# Patient Record
Sex: MALE | Race: WHITE | Employment: OTHER | URBAN - METROPOLITAN AREA
[De-identification: names, ages, dates, MRNs, and addresses within clinical notes are randomized per-mention and may not be internally consistent; named-entity substitution may affect disease eponyms.]

---

## 2021-09-29 ENCOUNTER — APPOINTMENT (OUTPATIENT)
Dept: GENERAL RADIOLOGY | Age: 76
DRG: 840 | End: 2021-09-29
Attending: EMERGENCY MEDICINE
Payer: MEDICARE

## 2021-09-29 ENCOUNTER — HOSPITAL ENCOUNTER (INPATIENT)
Age: 76
LOS: 11 days | Discharge: NURSING, REHAB OR LONG-TERM CARE | DRG: 840 | End: 2021-10-10
Attending: EMERGENCY MEDICINE | Admitting: STUDENT IN AN ORGANIZED HEALTH CARE EDUCATION/TRAINING PROGRAM
Payer: MEDICARE

## 2021-09-29 ENCOUNTER — APPOINTMENT (OUTPATIENT)
Dept: CT IMAGING | Age: 76
DRG: 840 | End: 2021-09-29
Attending: STUDENT IN AN ORGANIZED HEALTH CARE EDUCATION/TRAINING PROGRAM
Payer: MEDICARE

## 2021-09-29 DIAGNOSIS — C91.10 CLL (CHRONIC LYMPHOCYTIC LEUKEMIA) (HCC): ICD-10-CM

## 2021-09-29 DIAGNOSIS — R09.02 HYPOXIA: ICD-10-CM

## 2021-09-29 DIAGNOSIS — J18.9 COMMUNITY ACQUIRED PNEUMONIA OF LEFT LOWER LOBE OF LUNG: ICD-10-CM

## 2021-09-29 DIAGNOSIS — F41.9 CHRONIC ANXIETY: Primary | ICD-10-CM

## 2021-09-29 PROBLEM — J96.91 RESPIRATORY FAILURE WITH HYPOXIA (HCC): Status: ACTIVE | Noted: 2021-09-29

## 2021-09-29 LAB
ALBUMIN SERPL-MCNC: 3.3 G/DL (ref 3.5–5)
ALBUMIN/GLOB SERPL: 1.1 {RATIO} (ref 1.1–2.2)
ALP SERPL-CCNC: 173 U/L (ref 45–117)
ALT SERPL-CCNC: 34 U/L (ref 12–78)
ANION GAP SERPL CALC-SCNC: 2 MMOL/L (ref 5–15)
APPEARANCE UR: CLEAR
AST SERPL-CCNC: 28 U/L (ref 15–37)
ATRIAL RATE: 75 BPM
BACTERIA URNS QL MICRO: NEGATIVE /HPF
BILIRUB SERPL-MCNC: 0.6 MG/DL (ref 0.2–1)
BILIRUB UR QL: NEGATIVE
BNP SERPL-MCNC: 846 PG/ML
BUN SERPL-MCNC: 14 MG/DL (ref 6–20)
BUN/CREAT SERPL: 13 (ref 12–20)
CALCIUM SERPL-MCNC: 8.5 MG/DL (ref 8.5–10.1)
CALCULATED P AXIS, ECG09: 3 DEGREES
CALCULATED R AXIS, ECG10: 20 DEGREES
CALCULATED T AXIS, ECG11: -4 DEGREES
CHLORIDE SERPL-SCNC: 102 MMOL/L (ref 97–108)
CO2 SERPL-SCNC: 39 MMOL/L (ref 21–32)
COLOR UR: ABNORMAL
COMMENT, HOLDF: NORMAL
COVID-19 RAPID TEST, COVR: NOT DETECTED
CREAT SERPL-MCNC: 1.08 MG/DL (ref 0.7–1.3)
DIAGNOSIS, 93000: NORMAL
EPITH CASTS URNS QL MICRO: ABNORMAL /LPF
GLOBULIN SER CALC-MCNC: 3 G/DL (ref 2–4)
GLUCOSE SERPL-MCNC: 165 MG/DL (ref 65–100)
GLUCOSE UR STRIP.AUTO-MCNC: NEGATIVE MG/DL
HGB UR QL STRIP: ABNORMAL
KETONES UR QL STRIP.AUTO: NEGATIVE MG/DL
LACTATE SERPL-SCNC: 1.3 MMOL/L (ref 0.4–2)
LEUKOCYTE ESTERASE UR QL STRIP.AUTO: ABNORMAL
NITRITE UR QL STRIP.AUTO: NEGATIVE
P-R INTERVAL, ECG05: 174 MS
PH UR STRIP: 6.5 [PH] (ref 5–8)
POTASSIUM SERPL-SCNC: 3.3 MMOL/L (ref 3.5–5.1)
PROCALCITONIN SERPL-MCNC: <0.05 NG/ML
PROT SERPL-MCNC: 6.3 G/DL (ref 6.4–8.2)
PROT UR STRIP-MCNC: ABNORMAL MG/DL
Q-T INTERVAL, ECG07: 430 MS
QRS DURATION, ECG06: 88 MS
QTC CALCULATION (BEZET), ECG08: 480 MS
RBC #/AREA URNS HPF: ABNORMAL /HPF (ref 0–5)
SAMPLES BEING HELD,HOLD: NORMAL
SODIUM SERPL-SCNC: 143 MMOL/L (ref 136–145)
SOURCE, COVRS: NORMAL
SP GR UR REFRACTOMETRY: 1.02 (ref 1–1.03)
TROPONIN I SERPL-MCNC: <0.05 NG/ML
UROBILINOGEN UR QL STRIP.AUTO: 1 EU/DL (ref 0.2–1)
VENTRICULAR RATE, ECG03: 75 BPM
WBC URNS QL MICRO: ABNORMAL /HPF (ref 0–4)

## 2021-09-29 PROCEDURE — 74011000636 HC RX REV CODE- 636: Performed by: RADIOLOGY

## 2021-09-29 PROCEDURE — 74011250636 HC RX REV CODE- 250/636: Performed by: STUDENT IN AN ORGANIZED HEALTH CARE EDUCATION/TRAINING PROGRAM

## 2021-09-29 PROCEDURE — 83880 ASSAY OF NATRIURETIC PEPTIDE: CPT

## 2021-09-29 PROCEDURE — 99284 EMERGENCY DEPT VISIT MOD MDM: CPT

## 2021-09-29 PROCEDURE — 74011250636 HC RX REV CODE- 250/636: Performed by: EMERGENCY MEDICINE

## 2021-09-29 PROCEDURE — 83605 ASSAY OF LACTIC ACID: CPT

## 2021-09-29 PROCEDURE — 74011000258 HC RX REV CODE- 258: Performed by: EMERGENCY MEDICINE

## 2021-09-29 PROCEDURE — 71045 X-RAY EXAM CHEST 1 VIEW: CPT

## 2021-09-29 PROCEDURE — 94762 N-INVAS EAR/PLS OXIMTRY CONT: CPT

## 2021-09-29 PROCEDURE — 74011000258 HC RX REV CODE- 258: Performed by: STUDENT IN AN ORGANIZED HEALTH CARE EDUCATION/TRAINING PROGRAM

## 2021-09-29 PROCEDURE — 81001 URINALYSIS AUTO W/SCOPE: CPT

## 2021-09-29 PROCEDURE — 84145 PROCALCITONIN (PCT): CPT

## 2021-09-29 PROCEDURE — 85025 COMPLETE CBC W/AUTO DIFF WBC: CPT

## 2021-09-29 PROCEDURE — 36415 COLL VENOUS BLD VENIPUNCTURE: CPT

## 2021-09-29 PROCEDURE — 87040 BLOOD CULTURE FOR BACTERIA: CPT

## 2021-09-29 PROCEDURE — 74011250637 HC RX REV CODE- 250/637: Performed by: STUDENT IN AN ORGANIZED HEALTH CARE EDUCATION/TRAINING PROGRAM

## 2021-09-29 PROCEDURE — 80053 COMPREHEN METABOLIC PANEL: CPT

## 2021-09-29 PROCEDURE — 84484 ASSAY OF TROPONIN QUANT: CPT

## 2021-09-29 PROCEDURE — 65660000000 HC RM CCU STEPDOWN

## 2021-09-29 PROCEDURE — 0202U NFCT DS 22 TRGT SARS-COV-2: CPT

## 2021-09-29 PROCEDURE — 71275 CT ANGIOGRAPHY CHEST: CPT

## 2021-09-29 PROCEDURE — 96365 THER/PROPH/DIAG IV INF INIT: CPT

## 2021-09-29 PROCEDURE — 88374 M/PHMTRC ALYS ISHQUANT/SEMIQ: CPT

## 2021-09-29 PROCEDURE — 87635 SARS-COV-2 COVID-19 AMP PRB: CPT

## 2021-09-29 PROCEDURE — 93005 ELECTROCARDIOGRAM TRACING: CPT

## 2021-09-29 RX ORDER — TRAZODONE HYDROCHLORIDE 100 MG/1
100 TABLET ORAL
Status: DISCONTINUED | OUTPATIENT
Start: 2021-09-29 | End: 2021-10-10 | Stop reason: HOSPADM

## 2021-09-29 RX ORDER — POLYETHYLENE GLYCOL 3350 17 G/17G
17 POWDER, FOR SOLUTION ORAL DAILY PRN
Status: DISCONTINUED | OUTPATIENT
Start: 2021-09-29 | End: 2021-10-10 | Stop reason: HOSPADM

## 2021-09-29 RX ORDER — SODIUM CHLORIDE 0.9 % (FLUSH) 0.9 %
5-40 SYRINGE (ML) INJECTION EVERY 8 HOURS
Status: DISCONTINUED | OUTPATIENT
Start: 2021-09-29 | End: 2021-10-10 | Stop reason: HOSPADM

## 2021-09-29 RX ORDER — ONDANSETRON 4 MG/1
4 TABLET, ORALLY DISINTEGRATING ORAL
Status: DISCONTINUED | OUTPATIENT
Start: 2021-09-29 | End: 2021-10-10 | Stop reason: HOSPADM

## 2021-09-29 RX ORDER — BRIMONIDINE TARTRATE 2 MG/ML
1 SOLUTION/ DROPS OPHTHALMIC DAILY
Status: DISCONTINUED | OUTPATIENT
Start: 2021-09-30 | End: 2021-10-10 | Stop reason: HOSPADM

## 2021-09-29 RX ORDER — HEPARIN SODIUM 5000 [USP'U]/ML
5000 INJECTION, SOLUTION INTRAVENOUS; SUBCUTANEOUS EVERY 8 HOURS
Status: DISCONTINUED | OUTPATIENT
Start: 2021-09-30 | End: 2021-10-10 | Stop reason: HOSPADM

## 2021-09-29 RX ORDER — BUSPIRONE HYDROCHLORIDE 5 MG/1
10 TABLET ORAL 2 TIMES DAILY
Status: DISCONTINUED | OUTPATIENT
Start: 2021-09-30 | End: 2021-10-10 | Stop reason: HOSPADM

## 2021-09-29 RX ORDER — ACETAMINOPHEN 650 MG/1
650 SUPPOSITORY RECTAL
Status: DISCONTINUED | OUTPATIENT
Start: 2021-09-29 | End: 2021-10-10 | Stop reason: HOSPADM

## 2021-09-29 RX ORDER — ONDANSETRON 2 MG/ML
4 INJECTION INTRAMUSCULAR; INTRAVENOUS
Status: DISCONTINUED | OUTPATIENT
Start: 2021-09-29 | End: 2021-10-10 | Stop reason: HOSPADM

## 2021-09-29 RX ORDER — AMOXICILLIN 250 MG
1 CAPSULE ORAL 2 TIMES DAILY
Status: DISCONTINUED | OUTPATIENT
Start: 2021-09-29 | End: 2021-10-10 | Stop reason: HOSPADM

## 2021-09-29 RX ORDER — LATANOPROST 50 UG/ML
1 SOLUTION/ DROPS OPHTHALMIC EVERY EVENING
Status: DISCONTINUED | OUTPATIENT
Start: 2021-09-30 | End: 2021-10-10 | Stop reason: HOSPADM

## 2021-09-29 RX ORDER — LISINOPRIL 10 MG/1
10 TABLET ORAL DAILY
Status: DISCONTINUED | OUTPATIENT
Start: 2021-09-30 | End: 2021-10-10 | Stop reason: HOSPADM

## 2021-09-29 RX ORDER — ATORVASTATIN CALCIUM 20 MG/1
20 TABLET, FILM COATED ORAL
Status: DISCONTINUED | OUTPATIENT
Start: 2021-09-29 | End: 2021-09-30

## 2021-09-29 RX ORDER — FLUOXETINE HYDROCHLORIDE 20 MG/1
60 CAPSULE ORAL DAILY
Status: DISCONTINUED | OUTPATIENT
Start: 2021-09-30 | End: 2021-10-10 | Stop reason: HOSPADM

## 2021-09-29 RX ORDER — SODIUM CHLORIDE 9 MG/ML
100 INJECTION, SOLUTION INTRAVENOUS CONTINUOUS
Status: DISCONTINUED | OUTPATIENT
Start: 2021-09-29 | End: 2021-10-01

## 2021-09-29 RX ORDER — SODIUM CHLORIDE 0.9 % (FLUSH) 0.9 %
5-40 SYRINGE (ML) INJECTION AS NEEDED
Status: DISCONTINUED | OUTPATIENT
Start: 2021-09-29 | End: 2021-10-10 | Stop reason: HOSPADM

## 2021-09-29 RX ORDER — ARIPIPRAZOLE 2 MG/1
2 TABLET ORAL DAILY
Status: DISCONTINUED | OUTPATIENT
Start: 2021-09-30 | End: 2021-10-10 | Stop reason: HOSPADM

## 2021-09-29 RX ORDER — ALPRAZOLAM 1 MG/1
1 TABLET ORAL
Status: DISCONTINUED | OUTPATIENT
Start: 2021-09-29 | End: 2021-10-06

## 2021-09-29 RX ORDER — ACETAMINOPHEN 325 MG/1
650 TABLET ORAL
Status: DISCONTINUED | OUTPATIENT
Start: 2021-09-29 | End: 2021-10-10 | Stop reason: HOSPADM

## 2021-09-29 RX ADMIN — ATORVASTATIN CALCIUM 20 MG: 20 TABLET, FILM COATED ORAL at 22:44

## 2021-09-29 RX ADMIN — TRAZODONE HYDROCHLORIDE 100 MG: 100 TABLET ORAL at 22:43

## 2021-09-29 RX ADMIN — CEFTRIAXONE SODIUM 1 G: 1 INJECTION, POWDER, FOR SOLUTION INTRAMUSCULAR; INTRAVENOUS at 20:24

## 2021-09-29 RX ADMIN — SODIUM CHLORIDE 100 ML/HR: 9 INJECTION, SOLUTION INTRAVENOUS at 22:15

## 2021-09-29 RX ADMIN — IOPAMIDOL 80 ML: 755 INJECTION, SOLUTION INTRAVENOUS at 21:56

## 2021-09-29 RX ADMIN — DOXYCYCLINE 100 MG: 100 INJECTION, POWDER, LYOPHILIZED, FOR SOLUTION INTRAVENOUS at 22:45

## 2021-09-29 NOTE — ED TRIAGE NOTES
Patient resident at [facility]. Patient was checked by staff and called EMS for RA sats in the 60s. EMS arrived and the patient had no complaints and checked the patient's sats and they were in the 60s. EMS placed on NRB at 8 liters. Sats came up into the 90s.

## 2021-09-29 NOTE — ED PROVIDER NOTES
This is a 28-year-old male who saw his doctor yesterday for breakdown of the skin at the buttocks crease. Patient says they did not do anything for him. He does have some pain in that area. He was checked today routinely for an oxygen saturation was found to be in the 60s. He was placed on oxygen and it got somewhat better. EMS was called and he was brought here. He denies any shortness of breath and has no chest pain. He has had no fever or chills, nausea or vomiting. There has been an occasional cough. He denies any pain or swelling in his legs. He denies any other acute symptomatology. The only complaint is the area on his buttocks. No past medical history on file. No past surgical history on file. No family history on file. Social History     Socioeconomic History    Marital status: SINGLE     Spouse name: Not on file    Number of children: Not on file    Years of education: Not on file    Highest education level: Not on file   Occupational History    Not on file   Tobacco Use    Smoking status: Not on file   Substance and Sexual Activity    Alcohol use: Not on file    Drug use: Not on file    Sexual activity: Not on file   Other Topics Concern    Not on file   Social History Narrative    Not on file     Social Determinants of Health     Financial Resource Strain:     Difficulty of Paying Living Expenses:    Food Insecurity:     Worried About Running Out of Food in the Last Year:     [address] in the Last Year:    Transportation Needs:     Lack of Transportation (Medical):      Lack of Transportation (Non-Medical):    Physical Activity:     Days of Exercise per Week:     Minutes of Exercise per Session:    Stress:     Feeling of Stress :    Social Connections:     Frequency of Communication with Friends and Family:     Frequency of Social Gatherings with Friends and Family:     Attends [employer] Services:     Active Member of Clubs or Organizations:  Attends Club or Organization Meetings:     Marital Status:    Intimate Partner Violence:     Fear of Current or Ex-Partner:     Emotionally Abused:     Physically Abused:     Sexually Abused: ALLERGIES: Patient has no known allergies. Review of Systems   Constitutional: Negative for activity change, appetite change, chills, fatigue and fever. HENT: Negative for ear pain, facial swelling, sore throat and trouble swallowing. Eyes: Negative for pain, discharge and visual disturbance. Respiratory: Positive for cough ( occasional). Negative for chest tightness, shortness of breath and wheezing. Low oxygen saturations noted by the home   Cardiovascular: Negative for chest pain and palpitations. Gastrointestinal: Negative for abdominal pain, blood in stool, nausea and vomiting. Genitourinary: Negative for difficulty urinating, flank pain and hematuria. Musculoskeletal: Negative for arthralgias, joint swelling, myalgias and neck pain. Skin: Negative for color change and rash. Breakdown on the buttocks area. Neurological: Negative for dizziness, weakness, numbness and headaches. Hematological: Negative for adenopathy. Does not bruise/bleed easily. Psychiatric/Behavioral: Negative for behavioral problems, confusion and sleep disturbance. All other systems reviewed and are negative. Vitals:    09/29/21 1719   BP: (!) 158/98   Pulse: 76   Resp: 20   Temp: 99.1 °F (37.3 °C)   SpO2: 98%   Weight: 91.6 kg (202 lb)            Physical Exam  Vitals and nursing note reviewed. Constitutional:       General: He is not in acute distress. Appearance: He is well-developed. He is not ill-appearing or diaphoretic. Comments: This is a 50-year-old male who does not appear to be in any acute distress. Vital signs are as noted. HENT:      Head: Normocephalic and atraumatic. Nose: Nose normal.   Eyes:      General: No scleral icterus.      Conjunctiva/sclera: Conjunctivae normal.      Pupils: Pupils are equal, round, and reactive to light. Neck:      Thyroid: No thyromegaly. Vascular: No JVD. Trachea: No tracheal deviation. Comments: No carotid bruits noted. Cardiovascular:      Rate and Rhythm: Normal rate and regular rhythm. Heart sounds: Normal heart sounds. No murmur heard. No friction rub. No gallop. Pulmonary:      Effort: Pulmonary effort is normal. Tachypnea present. No accessory muscle usage or respiratory distress. Breath sounds: No stridor. Rales ( There are diffuse Rales in both lung fields bilaterally posteriorly and anteriorly. No definitive consolidation is noted.) present. No decreased breath sounds, wheezing or rhonchi. Chest:      Chest wall: No tenderness. Abdominal:      General: Bowel sounds are normal. There is no distension. Palpations: Abdomen is soft. There is no mass. Tenderness: There is no abdominal tenderness. There is no guarding or rebound. Musculoskeletal:         General: No tenderness. Normal range of motion. Cervical back: Normal range of motion and neck supple. Lymphadenopathy:      Cervical: No cervical adenopathy. Skin:     General: Skin is warm and dry. Findings: No erythema or rash. Comments: There is superficial skin breakdown at the beginning of the buttocks crease on black a lateral cheeks. The area is relatively small at about 3 cm or so in diameter. There is no erythema worse evidence of abscess formation. The area is somewhat tender to palpation. No other acute abnormalities noted there. There is some chronic erythema and breakdown on the right anterior lower leg that involves roughly third of the anterior portion of the lower leg. It is not hot. Neurological:      Mental Status: He is alert and oriented to person, place, and time. Cranial Nerves: No cranial nerve deficit.       Coordination: Coordination normal.      Deep Tendon Reflexes: Reflexes are normal and symmetric. Psychiatric:         Behavior: Behavior normal.         Thought Content: Thought content normal.         Judgment: Judgment normal.          MDM  Number of Diagnoses or Management Options  Community acquired pneumonia of left lower lobe of lung: new and requires workup  Hypoxia: new and requires workup     Amount and/or Complexity of Data Reviewed  Clinical lab tests: ordered and reviewed  Tests in the radiology section of CPT®: ordered and reviewed  Decide to obtain previous medical records or to obtain history from someone other than the patient: yes  Review and summarize past medical records: yes  Discuss the patient with other providers: yes  Independent visualization of images, tracings, or specimens: yes    Risk of Complications, Morbidity, and/or Mortality  Presenting problems: high  Diagnostic procedures: high  Management options: high    Patient Progress  Patient progress: stable         Procedures    This is a 69-year-old male who does not appear to be in any acute distress. He speaking in full sentences. On 6 L of nasal oxygen he is at 92%. He does have significant findings on chest exam bilaterally. No consolidation is noted. X-rays and labs are currently pending. Will likely require admission with hypoxia. Covid testing is being done. The patient's white cell count is up to 15,000. His lactate is normal but his saturations are low and it appears that he has markings in his left middle and lower lobe and in the right lower lobe that are not inconsistent with pneumonia. Consult the hospitalist for admission. Perfect Serve Consult for Admission  7:36 PM    ED Room Number: PK82/01  Patient Name and age:  Tara Zhao 77 y.o.  male  Working Diagnosis:   1. Hypoxia    2.  Community acquired pneumonia of left lower lobe of lung        COVID-19 Suspicion:  no  Sepsis present:  no  Reassessment needed: no  Code Status:  Full Code  Readmission: no  Isolation Requirements:  no  Recommended Level of Care:  telemetry  Department:[facility] Adult ED - [phone]  Other:

## 2021-09-29 NOTE — ED NOTES
Bedside shift change report given to Fabian Jennings (oncoming nurse) by Mary Doran (offgoing nurse). Report included the following information SBAR, Kardex, ED Summary and MAR.

## 2021-09-30 ENCOUNTER — APPOINTMENT (OUTPATIENT)
Dept: NON INVASIVE DIAGNOSTICS | Age: 76
DRG: 840 | End: 2021-09-30
Attending: INTERNAL MEDICINE
Payer: MEDICARE

## 2021-09-30 ENCOUNTER — APPOINTMENT (OUTPATIENT)
Dept: GENERAL RADIOLOGY | Age: 76
DRG: 840 | End: 2021-09-30
Attending: INTERNAL MEDICINE
Payer: MEDICARE

## 2021-09-30 LAB
ANION GAP SERPL CALC-SCNC: 3 MMOL/L (ref 5–15)
ARTERIAL PATENCY WRIST A: POSITIVE
ARTERIAL PATENCY WRIST A: POSITIVE
B PERT DNA SPEC QL NAA+PROBE: NOT DETECTED
B PERT DNA SPEC QL NAA+PROBE: NOT DETECTED
BASE EXCESS BLD CALC-SCNC: 12.5 MMOL/L
BASE EXCESS BLD CALC-SCNC: 8.4 MMOL/L
BASOPHILS # BLD: 0 K/UL (ref 0–0.1)
BASOPHILS # BLD: 0 K/UL (ref 0–0.1)
BASOPHILS NFR BLD: 0 % (ref 0–1)
BASOPHILS NFR BLD: 0 % (ref 0–1)
BDY SITE: ABNORMAL
BDY SITE: ABNORMAL
BNP SERPL-MCNC: 639 PG/ML
BORDETELLA PARAPERTUSSIS PCR, BORPAR: NOT DETECTED
BORDETELLA PARAPERTUSSIS PCR, BORPAR: NOT DETECTED
BUN SERPL-MCNC: 13 MG/DL (ref 6–20)
BUN/CREAT SERPL: 16 (ref 12–20)
C PNEUM DNA SPEC QL NAA+PROBE: NOT DETECTED
C PNEUM DNA SPEC QL NAA+PROBE: NOT DETECTED
CALCIUM SERPL-MCNC: 7.9 MG/DL (ref 8.5–10.1)
CHLORIDE SERPL-SCNC: 104 MMOL/L (ref 97–108)
CO2 SERPL-SCNC: 36 MMOL/L (ref 21–32)
CREAT SERPL-MCNC: 0.8 MG/DL (ref 0.7–1.3)
DIFFERENTIAL METHOD BLD: ABNORMAL
DIFFERENTIAL METHOD BLD: ABNORMAL
ECHO AO ROOT DIAM: 3.75 CM
ECHO AV AREA PEAK VELOCITY: 2.37 CM2
ECHO AV PEAK GRADIENT: 14.4 MMHG
ECHO AV PEAK VELOCITY: 189.76 CM/S
ECHO EST RA PRESSURE: 3 MMHG
ECHO LV INTERNAL DIMENSION DIASTOLIC: 4.73 CM (ref 4.2–5.9)
ECHO LV INTERNAL DIMENSION SYSTOLIC: 3.2 CM
ECHO LV IVSD: 1.22 CM (ref 0.6–1)
ECHO LV MASS 2D: 201.6 G (ref 88–224)
ECHO LV POSTERIOR WALL DIASTOLIC: 1.08 CM (ref 0.6–1)
ECHO LVOT DIAM: 2.44 CM
ECHO LVOT PEAK GRADIENT: 3.69 MMHG
ECHO LVOT PEAK VELOCITY: 95.99 CM/S
ECHO MV A VELOCITY: 116.19 CM/S
ECHO MV AREA PHT: 3.28 CM2
ECHO MV E DECELERATION TIME (DT): 231.58 MS
ECHO MV E VELOCITY: 92.5 CM/S
ECHO MV E/A RATIO: 0.8
ECHO MV PRESSURE HALF TIME (PHT): 67.16 MS
ECHO PV PEAK INSTANTANEOUS GRADIENT SYSTOLIC: 2.72 MMHG
ECHO RIGHT VENTRICULAR SYSTOLIC PRESSURE (RVSP): 20.75 MMHG
ECHO RV INTERNAL DIMENSION: 4.45 CM
ECHO RV TAPSE: 1.62 CM (ref 1.5–2)
ECHO TV REGURGITANT MAX VELOCITY: 210.66 CM/S
ECHO TV REGURGITANT PEAK GRADIENT: 17.75 MMHG
EOSINOPHIL # BLD: 0.2 K/UL (ref 0–0.4)
EOSINOPHIL # BLD: 0.5 K/UL (ref 0–0.4)
EOSINOPHIL NFR BLD: 2 % (ref 0–7)
EOSINOPHIL NFR BLD: 3 % (ref 0–7)
ERYTHROCYTE [DISTWIDTH] IN BLOOD BY AUTOMATED COUNT: 13.5 % (ref 11.5–14.5)
ERYTHROCYTE [DISTWIDTH] IN BLOOD BY AUTOMATED COUNT: 13.7 % (ref 11.5–14.5)
FLUAV H1 2009 PAND RNA SPEC QL NAA+PROBE: NOT DETECTED
FLUAV H1 2009 PAND RNA SPEC QL NAA+PROBE: NOT DETECTED
FLUAV H1 RNA SPEC QL NAA+PROBE: NOT DETECTED
FLUAV H1 RNA SPEC QL NAA+PROBE: NOT DETECTED
FLUAV H3 RNA SPEC QL NAA+PROBE: NOT DETECTED
FLUAV H3 RNA SPEC QL NAA+PROBE: NOT DETECTED
FLUAV SUBTYP SPEC NAA+PROBE: NOT DETECTED
FLUAV SUBTYP SPEC NAA+PROBE: NOT DETECTED
FLUBV RNA SPEC QL NAA+PROBE: NOT DETECTED
FLUBV RNA SPEC QL NAA+PROBE: NOT DETECTED
GAS FLOW.O2 O2 DELIVERY SYS: ABNORMAL L/MIN
GLUCOSE SERPL-MCNC: 160 MG/DL (ref 65–100)
HADV DNA SPEC QL NAA+PROBE: NOT DETECTED
HADV DNA SPEC QL NAA+PROBE: NOT DETECTED
HCO3 BLD-SCNC: 39.4 MMOL/L (ref 22–26)
HCO3 BLD-SCNC: 42.7 MMOL/L (ref 22–26)
HCOV 229E RNA SPEC QL NAA+PROBE: NOT DETECTED
HCOV 229E RNA SPEC QL NAA+PROBE: NOT DETECTED
HCOV HKU1 RNA SPEC QL NAA+PROBE: NOT DETECTED
HCOV HKU1 RNA SPEC QL NAA+PROBE: NOT DETECTED
HCOV NL63 RNA SPEC QL NAA+PROBE: NOT DETECTED
HCOV NL63 RNA SPEC QL NAA+PROBE: NOT DETECTED
HCOV OC43 RNA SPEC QL NAA+PROBE: NOT DETECTED
HCOV OC43 RNA SPEC QL NAA+PROBE: NOT DETECTED
HCT VFR BLD AUTO: 47.9 % (ref 36.6–50.3)
HCT VFR BLD AUTO: 48.7 % (ref 36.6–50.3)
HGB BLD-MCNC: 14.3 G/DL (ref 12.1–17)
HGB BLD-MCNC: 14.8 G/DL (ref 12.1–17)
HMPV RNA SPEC QL NAA+PROBE: NOT DETECTED
HMPV RNA SPEC QL NAA+PROBE: NOT DETECTED
HPIV1 RNA SPEC QL NAA+PROBE: NOT DETECTED
HPIV1 RNA SPEC QL NAA+PROBE: NOT DETECTED
HPIV2 RNA SPEC QL NAA+PROBE: NOT DETECTED
HPIV2 RNA SPEC QL NAA+PROBE: NOT DETECTED
HPIV3 RNA SPEC QL NAA+PROBE: NOT DETECTED
HPIV3 RNA SPEC QL NAA+PROBE: NOT DETECTED
HPIV4 RNA SPEC QL NAA+PROBE: NOT DETECTED
HPIV4 RNA SPEC QL NAA+PROBE: NOT DETECTED
IMM GRANULOCYTES # BLD AUTO: 0 K/UL
IMM GRANULOCYTES # BLD AUTO: 0 K/UL
IMM GRANULOCYTES NFR BLD AUTO: 0 %
IMM GRANULOCYTES NFR BLD AUTO: 0 %
LYMPHOCYTES # BLD: 10.2 K/UL (ref 0.8–3.5)
LYMPHOCYTES # BLD: 8.1 K/UL (ref 0.8–3.5)
LYMPHOCYTES NFR BLD: 65 % (ref 12–49)
LYMPHOCYTES NFR BLD: 69 % (ref 12–49)
M PNEUMO DNA SPEC QL NAA+PROBE: NOT DETECTED
M PNEUMO DNA SPEC QL NAA+PROBE: NOT DETECTED
MCH RBC QN AUTO: 28.9 PG (ref 26–34)
MCH RBC QN AUTO: 29.8 PG (ref 26–34)
MCHC RBC AUTO-ENTMCNC: 29.4 G/DL (ref 30–36.5)
MCHC RBC AUTO-ENTMCNC: 30.9 G/DL (ref 30–36.5)
MCV RBC AUTO: 96.6 FL (ref 80–99)
MCV RBC AUTO: 98.4 FL (ref 80–99)
METAMYELOCYTES NFR BLD MANUAL: 1 %
MONOCYTES # BLD: 0.5 K/UL (ref 0–1)
MONOCYTES # BLD: 1.1 K/UL (ref 0–1)
MONOCYTES NFR BLD: 3 % (ref 5–13)
MONOCYTES NFR BLD: 9 % (ref 5–13)
NEUTS SEG # BLD: 2.9 K/UL (ref 1.8–8)
NEUTS SEG # BLD: 3.8 K/UL (ref 1.8–8)
NEUTS SEG NFR BLD: 23 % (ref 32–75)
NEUTS SEG NFR BLD: 25 % (ref 32–75)
NRBC # BLD: 0 K/UL (ref 0–0.01)
NRBC # BLD: 0.02 K/UL (ref 0–0.01)
NRBC BLD-RTO: 0 PER 100 WBC
NRBC BLD-RTO: 0.1 PER 100 WBC
O2/TOTAL GAS SETTING VFR VENT: 40 %
PATH REV BLD -IMP: ABNORMAL
PCO2 BLD: 77.7 MMHG (ref 35–45)
PCO2 BLD: 85.8 MMHG (ref 35–45)
PH BLD: 7.27 [PH] (ref 7.35–7.45)
PH BLD: 7.35 [PH] (ref 7.35–7.45)
PLATELET # BLD AUTO: 100 K/UL (ref 150–400)
PLATELET # BLD AUTO: 115 K/UL (ref 150–400)
PMV BLD AUTO: 10 FL (ref 8.9–12.9)
PMV BLD AUTO: 9.5 FL (ref 8.9–12.9)
PO2 BLD: 70 MMHG (ref 80–100)
PO2 BLD: 87 MMHG (ref 80–100)
POTASSIUM SERPL-SCNC: 3.5 MMOL/L (ref 3.5–5.1)
PROCALCITONIN SERPL-MCNC: <0.05 NG/ML
RBC # BLD AUTO: 4.95 M/UL (ref 4.1–5.7)
RBC # BLD AUTO: 4.96 M/UL (ref 4.1–5.7)
RBC MORPH BLD: ABNORMAL
RBC MORPH BLD: ABNORMAL
RSV RNA SPEC QL NAA+PROBE: NOT DETECTED
RSV RNA SPEC QL NAA+PROBE: NOT DETECTED
RV+EV RNA SPEC QL NAA+PROBE: NOT DETECTED
RV+EV RNA SPEC QL NAA+PROBE: NOT DETECTED
SAO2 % BLD: 91.5 % (ref 92–97)
SAO2 % BLD: 94.3 % (ref 92–97)
SARS-COV-2 PCR, COVPCR: NOT DETECTED
SARS-COV-2 PCR, COVPCR: NOT DETECTED
SODIUM SERPL-SCNC: 143 MMOL/L (ref 136–145)
SPECIMEN TYPE: ABNORMAL
SPECIMEN TYPE: ABNORMAL
WBC # BLD AUTO: 12.4 K/UL (ref 4.1–11.1)
WBC # BLD AUTO: 15 K/UL (ref 4.1–11.1)
WBC MORPH BLD: ABNORMAL

## 2021-09-30 PROCEDURE — 97535 SELF CARE MNGMENT TRAINING: CPT

## 2021-09-30 PROCEDURE — 82803 BLOOD GASES ANY COMBINATION: CPT

## 2021-09-30 PROCEDURE — 97161 PT EVAL LOW COMPLEX 20 MIN: CPT

## 2021-09-30 PROCEDURE — 97165 OT EVAL LOW COMPLEX 30 MIN: CPT

## 2021-09-30 PROCEDURE — 74011250637 HC RX REV CODE- 250/637: Performed by: STUDENT IN AN ORGANIZED HEALTH CARE EDUCATION/TRAINING PROGRAM

## 2021-09-30 PROCEDURE — 94660 CPAP INITIATION&MGMT: CPT

## 2021-09-30 PROCEDURE — 74011000258 HC RX REV CODE- 258: Performed by: STUDENT IN AN ORGANIZED HEALTH CARE EDUCATION/TRAINING PROGRAM

## 2021-09-30 PROCEDURE — 36415 COLL VENOUS BLD VENIPUNCTURE: CPT

## 2021-09-30 PROCEDURE — 85025 COMPLETE CBC W/AUTO DIFF WBC: CPT

## 2021-09-30 PROCEDURE — 74011000250 HC RX REV CODE- 250: Performed by: STUDENT IN AN ORGANIZED HEALTH CARE EDUCATION/TRAINING PROGRAM

## 2021-09-30 PROCEDURE — 77010033678 HC OXYGEN DAILY

## 2021-09-30 PROCEDURE — 93306 TTE W/DOPPLER COMPLETE: CPT | Performed by: INTERNAL MEDICINE

## 2021-09-30 PROCEDURE — 74011250636 HC RX REV CODE- 250/636: Performed by: STUDENT IN AN ORGANIZED HEALTH CARE EDUCATION/TRAINING PROGRAM

## 2021-09-30 PROCEDURE — 0202U NFCT DS 22 TRGT SARS-COV-2: CPT

## 2021-09-30 PROCEDURE — 84145 PROCALCITONIN (PCT): CPT

## 2021-09-30 PROCEDURE — 97530 THERAPEUTIC ACTIVITIES: CPT

## 2021-09-30 PROCEDURE — 80048 BASIC METABOLIC PNL TOTAL CA: CPT

## 2021-09-30 PROCEDURE — 74011250637 HC RX REV CODE- 250/637: Performed by: INTERNAL MEDICINE

## 2021-09-30 PROCEDURE — 65660000000 HC RM CCU STEPDOWN

## 2021-09-30 PROCEDURE — 74011250636 HC RX REV CODE- 250/636: Performed by: INTERNAL MEDICINE

## 2021-09-30 PROCEDURE — 5A09357 ASSISTANCE WITH RESPIRATORY VENTILATION, LESS THAN 24 CONSECUTIVE HOURS, CONTINUOUS POSITIVE AIRWAY PRESSURE: ICD-10-PCS | Performed by: INTERNAL MEDICINE

## 2021-09-30 PROCEDURE — 99223 1ST HOSP IP/OBS HIGH 75: CPT | Performed by: INTERNAL MEDICINE

## 2021-09-30 PROCEDURE — 71045 X-RAY EXAM CHEST 1 VIEW: CPT

## 2021-09-30 PROCEDURE — 36600 WITHDRAWAL OF ARTERIAL BLOOD: CPT

## 2021-09-30 PROCEDURE — C8929 TTE W OR WO FOL WCON,DOPPLER: HCPCS

## 2021-09-30 PROCEDURE — 83880 ASSAY OF NATRIURETIC PEPTIDE: CPT

## 2021-09-30 PROCEDURE — 74011000250 HC RX REV CODE- 250: Performed by: INTERNAL MEDICINE

## 2021-09-30 PROCEDURE — 77030013038 HC MSK CPAP FISP -A

## 2021-09-30 PROCEDURE — 94762 N-INVAS EAR/PLS OXIMTRY CONT: CPT

## 2021-09-30 RX ORDER — ARIPIPRAZOLE 2 MG/1
1 TABLET ORAL DAILY
COMMUNITY
Start: 2021-09-26

## 2021-09-30 RX ORDER — SERTRALINE HYDROCHLORIDE 100 MG/1
1 TABLET, FILM COATED ORAL DAILY
COMMUNITY
Start: 2021-09-07 | End: 2021-10-10

## 2021-09-30 RX ORDER — FLUOXETINE HYDROCHLORIDE 60 MG/1
1 TABLET, FILM COATED ORAL; ORAL DAILY
COMMUNITY
Start: 2021-09-25

## 2021-09-30 RX ORDER — LISINOPRIL 10 MG/1
1 TABLET ORAL DAILY
COMMUNITY
Start: 2021-09-23

## 2021-09-30 RX ORDER — ACETAMINOPHEN 325 MG/1
650 TABLET ORAL 2 TIMES DAILY
COMMUNITY
End: 2021-10-10

## 2021-09-30 RX ORDER — BALSAM PERU/CASTOR OIL
OINTMENT (GRAM) TOPICAL 3 TIMES DAILY
Status: DISCONTINUED | OUTPATIENT
Start: 2021-09-30 | End: 2021-10-10 | Stop reason: HOSPADM

## 2021-09-30 RX ORDER — POTASSIUM CHLORIDE 7.45 MG/ML
10 INJECTION INTRAVENOUS
Status: COMPLETED | OUTPATIENT
Start: 2021-09-30 | End: 2021-09-30

## 2021-09-30 RX ORDER — BRIMONIDINE TARTRATE 2 MG/ML
1 SOLUTION/ DROPS OPHTHALMIC DAILY
COMMUNITY
Start: 2021-08-30

## 2021-09-30 RX ORDER — ATORVASTATIN CALCIUM 20 MG/1
30 TABLET, FILM COATED ORAL
COMMUNITY

## 2021-09-30 RX ORDER — AMOXICILLIN 250 MG
1 CAPSULE ORAL
COMMUNITY

## 2021-09-30 RX ORDER — TRAZODONE HYDROCHLORIDE 100 MG/1
1 TABLET ORAL
COMMUNITY
Start: 2021-09-10 | End: 2021-10-10

## 2021-09-30 RX ORDER — LATANOPROST 50 UG/ML
1 SOLUTION/ DROPS OPHTHALMIC DAILY
COMMUNITY
Start: 2021-09-16

## 2021-09-30 RX ORDER — ATORVASTATIN CALCIUM 10 MG/1
1 TABLET, FILM COATED ORAL
Status: ON HOLD | COMMUNITY
Start: 2021-09-20 | End: 2021-09-30

## 2021-09-30 RX ORDER — METFORMIN HYDROCHLORIDE 500 MG/1
0.5 TABLET ORAL 2 TIMES DAILY
COMMUNITY
Start: 2021-09-24

## 2021-09-30 RX ORDER — BUSPIRONE HYDROCHLORIDE 10 MG/1
1 TABLET ORAL 2 TIMES DAILY
COMMUNITY
Start: 2021-09-26

## 2021-09-30 RX ORDER — ALPRAZOLAM 1 MG/1
1 TABLET ORAL 2 TIMES DAILY
Status: ON HOLD | COMMUNITY
Start: 2021-08-14 | End: 2021-10-10 | Stop reason: SDUPTHER

## 2021-09-30 RX ORDER — ICOSAPENT ETHYL 1000 MG/1
1 CAPSULE ORAL DAILY
COMMUNITY
Start: 2021-09-27

## 2021-09-30 RX ADMIN — Medication 10 ML: at 01:26

## 2021-09-30 RX ADMIN — POTASSIUM CHLORIDE 10 MEQ: 7.45 INJECTION INTRAVENOUS at 11:59

## 2021-09-30 RX ADMIN — FLUOXETINE 60 MG: 20 CAPSULE ORAL at 08:59

## 2021-09-30 RX ADMIN — POTASSIUM CHLORIDE 10 MEQ: 7.45 INJECTION INTRAVENOUS at 08:59

## 2021-09-30 RX ADMIN — HEPARIN SODIUM 5000 UNITS: 5000 INJECTION INTRAVENOUS; SUBCUTANEOUS at 09:00

## 2021-09-30 RX ADMIN — HEPARIN SODIUM 5000 UNITS: 5000 INJECTION INTRAVENOUS; SUBCUTANEOUS at 23:52

## 2021-09-30 RX ADMIN — DOXYCYCLINE 100 MG: 100 INJECTION, POWDER, LYOPHILIZED, FOR SOLUTION INTRAVENOUS at 21:44

## 2021-09-30 RX ADMIN — BUSPIRONE HYDROCHLORIDE 10 MG: 5 TABLET ORAL at 17:05

## 2021-09-30 RX ADMIN — LISINOPRIL 10 MG: 10 TABLET ORAL at 09:00

## 2021-09-30 RX ADMIN — DOXYCYCLINE 100 MG: 100 INJECTION, POWDER, LYOPHILIZED, FOR SOLUTION INTRAVENOUS at 09:31

## 2021-09-30 RX ADMIN — SENNOSIDES AND DOCUSATE SODIUM 1 TABLET: 50; 8.6 TABLET ORAL at 08:59

## 2021-09-30 RX ADMIN — ARIPIPRAZOLE 2 MG: 2 TABLET ORAL at 09:00

## 2021-09-30 RX ADMIN — BRIMONIDINE TARTRATE 1 DROP: 2 SOLUTION OPHTHALMIC at 09:00

## 2021-09-30 RX ADMIN — CASTOR OIL AND BALSAM, PERU: 788; 87 OINTMENT TOPICAL at 17:04

## 2021-09-30 RX ADMIN — Medication 10 ML: at 06:37

## 2021-09-30 RX ADMIN — Medication 10 ML: at 21:45

## 2021-09-30 RX ADMIN — HEPARIN SODIUM 5000 UNITS: 5000 INJECTION INTRAVENOUS; SUBCUTANEOUS at 17:05

## 2021-09-30 RX ADMIN — HEPARIN SODIUM 5000 UNITS: 5000 INJECTION INTRAVENOUS; SUBCUTANEOUS at 00:22

## 2021-09-30 RX ADMIN — BUSPIRONE HYDROCHLORIDE 10 MG: 5 TABLET ORAL at 08:59

## 2021-09-30 RX ADMIN — CEFTRIAXONE SODIUM 2 G: 2 INJECTION, POWDER, FOR SOLUTION INTRAMUSCULAR; INTRAVENOUS at 09:32

## 2021-09-30 RX ADMIN — CASTOR OIL AND BALSAM, PERU: 788; 87 OINTMENT TOPICAL at 21:45

## 2021-09-30 RX ADMIN — TRAZODONE HYDROCHLORIDE 100 MG: 100 TABLET ORAL at 21:44

## 2021-09-30 RX ADMIN — LATANOPROST 1 DROP: 50 SOLUTION OPHTHALMIC at 17:11

## 2021-09-30 RX ADMIN — POTASSIUM CHLORIDE 10 MEQ: 7.45 INJECTION INTRAVENOUS at 09:59

## 2021-09-30 RX ADMIN — SODIUM CHLORIDE 100 ML/HR: 9 INJECTION, SOLUTION INTRAVENOUS at 20:18

## 2021-09-30 RX ADMIN — ATORVASTATIN CALCIUM 30 MG: 20 TABLET, FILM COATED ORAL at 21:44

## 2021-09-30 RX ADMIN — POTASSIUM CHLORIDE 10 MEQ: 7.45 INJECTION INTRAVENOUS at 10:59

## 2021-09-30 RX ADMIN — PERFLUTREN 1.5 ML: 6.52 INJECTION, SUSPENSION INTRAVENOUS at 12:10

## 2021-09-30 NOTE — PROGRESS NOTES
Bedside shift change report given to [address] (oncoming nurse) by [facility] RN (offgoing nurse). Report included the following information SBAR, Kardex, Intake/Output, MAR and Recent Results.

## 2021-09-30 NOTE — H&P
History & Physical    Primary Care Provider: No primary care provider on file. Source of Information: Patient and chart review    History of Presenting Illness:   Sandy Lai is a 77 y.o. male w/ hx of mdd w/ SERGEI, htn, glaucoma, NIDDM II, Dyslipidemia who is referred to hospital from [address] [place] [address] for hypoxia. Incidental hypoxia noted during routine vitals. Pt states he feels well otherwise and had no acute complaints or concerns. Reported hypoxia to 60s prior to EMS arrival.  Concerned for \"lesions\" on his buttocks noted about a week ago, recurrent and intermittently painful. Saw his PCP abiut these lesion and was prescribed a cream. Denies melena or dyschezia. The patient denies any fever, cough, chills, chest or abdominal pain, nausea, vomiting, cough, congestion, recent illness, palpitations, or dysuria. Remarkable vitals on ER Presentations: bp 158/98, o2 sats 98% on 8L Non-rebreather  Labs Remarkable for: wbc 15, k 3.3, glu 165, rapid covid neg  ER Images: Patchy densities in the left mid and lower thorax and right base could represent  atelectasis or airspace disease. ER Rx: rocephin 1g     Review of Systems:  A comprehensive review of systems was negative except for that written in the History of Present Illness. No past medical history on file. No past surgical history on file. Prior to Admission medications    Not on File     No Known Allergies   No family history on file.      SOCIAL HISTORY:  Patient resides:  Independently    Assisted Living x   SNF    With family care       Smoking history:   None x   Former    Chronic      Alcohol history:   None x   Social    Chronic      Ambulates:   Independently x   w/cane    w/walker    w/wc    CODE STATUS:  DNR    Full x   Other      Objective:     Physical Exam:     Visit Vitals  BP (!) 154/78   Pulse 75   Temp 99.1 °F (37.3 °C)   Resp 23   Wt 91.6 kg (202 lb)   SpO2 95%    O2 Flow Rate (L/min): 8 l/min O2 Device: Non-rebreather mask    General:  Alert, cooperative, no distress, appears stated age. Head:  Normocephalic, without obvious abnormality, atraumatic. Eyes:  Conjunctivae/corneas clear. PERRL, EOMs intact. Nose: Nares normal. Septum midline. Mucosa normal.        Neck: Supple, symmetrical, trachea midline, no carotid bruit and no JVD. Lungs:   Clear to auscultation bilaterally. Diminished bibasalar air entry. Chest wall:  No tenderness or deformity. Heart:  Regular rate and rhythm, S1, S2 normal   Abdomen:   Soft, non-tender. Bowel sounds normal. No masses,  No organomegaly. Extremities: Extremities normal, atraumatic, no cyanosis or edema. Bottock: nickel sized areas of crusted skin-tears w/o erythema or drainage. Pulses: 2+ and symmetric all extremities. Skin: Skin color, texture, turgor normal. No rashes or lesions   Neurologic: CNII-XII intact. EKG:  NSR w/ PVC  Data Review:     Recent Days:  Recent Labs     09/29/21  1755   WBC 15.0*   HGB 14.8   HCT 47.9   *     Recent Labs     09/29/21  1755      K 3.3*      CO2 39*   *   BUN 14   CREA 1.08   CA 8.5   ALB 3.3*   ALT 34     No results for input(s): PH, PCO2, PO2, HCO3, FIO2 in the last 72 hours. 24 Hour Results:  Recent Results (from the past 24 hour(s))   CBC WITH AUTOMATED DIFF    Collection Time: 09/29/21  5:55 PM   Result Value Ref Range    WBC 15.0 (H) 4.1 - 11.1 K/uL    RBC 4.96 4.10 - 5.70 M/uL    HGB 14.8 12.1 - 17.0 g/dL    HCT 47.9 36.6 - 50.3 %    MCV 96.6 80.0 - 99.0 FL    MCH 29.8 26.0 - 34.0 PG    MCHC 30.9 30.0 - 36.5 g/dL    RDW 13.7 11.5 - 14.5 %    PLATELET 403 (L) 740 - 400 K/uL    MPV 9.5 8.9 - 12.9 FL    NRBC 0.1 (H) 0  WBC    ABSOLUTE NRBC 0.02 (H) 0.00 - 0.01 K/uL    NEUTROPHILS PENDING %    LYMPHOCYTES PENDING %    MONOCYTES PENDING %    EOSINOPHILS PENDING %    BASOPHILS PENDING %    IMMATURE GRANULOCYTES PENDING %    ABS.  NEUTROPHILS PENDING K/UL ABS. LYMPHOCYTES PENDING K/UL    ABS. MONOCYTES PENDING K/UL    ABS. EOSINOPHILS PENDING K/UL    ABS. BASOPHILS PENDING K/UL    ABS. IMM. GRANS. PENDING K/UL    DF PENDING    METABOLIC PANEL, COMPREHENSIVE    Collection Time: 09/29/21  5:55 PM   Result Value Ref Range    Sodium 143 136 - 145 mmol/L    Potassium 3.3 (L) 3.5 - 5.1 mmol/L    Chloride 102 97 - 108 mmol/L    CO2 39 (H) 21 - 32 mmol/L    Anion gap 2 (L) 5 - 15 mmol/L    Glucose 165 (H) 65 - 100 mg/dL    BUN 14 6 - 20 MG/DL    Creatinine 1.08 0.70 - 1.30 MG/DL    BUN/Creatinine ratio 13 12 - 20      GFR est AA >60 >60 ml/min/1.73m2    GFR est non-AA >60 >60 ml/min/1.73m2    Calcium 8.5 8.5 - 10.1 MG/DL    Bilirubin, total 0.6 0.2 - 1.0 MG/DL    ALT (SGPT) 34 12 - 78 U/L    AST (SGOT) 28 15 - 37 U/L    Alk. phosphatase 173 (H) 45 - 117 U/L    Protein, total 6.3 (L) 6.4 - 8.2 g/dL    Albumin 3.3 (L) 3.5 - 5.0 g/dL    Globulin 3.0 2.0 - 4.0 g/dL    A-G Ratio 1.1 1.1 - 2.2     SAMPLES BEING HELD    Collection Time: 09/29/21  5:55 PM   Result Value Ref Range    SAMPLES BEING HELD 1RED 1BLU     COMMENT        Add-on orders for these samples will be processed based on acceptable specimen integrity and analyte stability, which may vary by analyte.    LACTIC ACID    Collection Time: 09/29/21  5:55 PM   Result Value Ref Range    Lactic acid 1.3 0.4 - 2.0 MMOL/L   NT-PRO BNP    Collection Time: 09/29/21  5:55 PM   Result Value Ref Range    NT pro- (H) <450 PG/ML   TROPONIN I    Collection Time: 09/29/21  5:55 PM   Result Value Ref Range    Troponin-I, Qt. <0.05 <0.05 ng/mL   URINALYSIS W/ RFLX MICROSCOPIC    Collection Time: 09/29/21  6:20 PM   Result Value Ref Range    Color YELLOW/STRAW      Appearance CLEAR CLEAR      Specific gravity 1.021 1.003 - 1.030      pH (UA) 6.5 5.0 - 8.0      Protein TRACE (A) NEG mg/dL    Glucose Negative NEG mg/dL    Ketone Negative NEG mg/dL    Bilirubin Negative NEG      Blood TRACE (A) NEG      Urobilinogen 1.0 0.2 - 1.0 EU/dL Nitrites Negative NEG      Leukocyte Esterase MODERATE (A) NEG      WBC 0-4 0 - 4 /hpf    RBC 0-5 0 - 5 /hpf    Epithelial cells FEW FEW /lpf    Bacteria Negative NEG /hpf   COVID-19 RAPID TEST    Collection Time: 09/29/21  6:20 PM   Result Value Ref Range    Specimen source Nasopharyngeal      COVID-19 rapid test Not detected NOTD           Imaging:     Assessment:     Olayinka Brownlee is a 77 y.o. male w/ hx of mdd w/ SERGEI, htn, glaucoma, NIDDM II, Dyslipidemia who is admitted for acute hypoxic respiratory failure. Plan:       Acute Hypoxic Respiratory Failure  -Unclear etiology w/ CXR concerning for infection vs atelectasis  -? Hx of COPD, ZAHIRA and ?obesity hypoventilation  -Check Procalcitonin, CTA chest, RVP, abg  -Rocephin + Doxy    HTN  -Continue home Lisinopril    NIDDM II  -Hold Metformin  -Lantus 10u + SSI    MDD w/ SERGEI   -Continue Buspar, Fluoexetine, trazodone, prn xanax    Dyslipidemia  -Home lipitor    Glaucoma  -Continue home brimonidine and xalatan              FEN/GI -  NS @ 100 ml/hr  Activity - as tolerated  DVT prophylaxis - Heparin  GI prophylaxis -  NI  Disposition - Back to assisted living    CODE STATUS:  DNR       Signed By: Celine Pastor MD     September 29, 2021

## 2021-09-30 NOTE — PROGRESS NOTES
Spiritual Care Assessment/Progress Note  [address]      NAME: Irina Rabago      MRN: [id number]  AGE: 77 y.o.  SEX: male  Taoist Affiliation: Scientologist   Language: English     9/30/2021     Total Time (in minutes): 25     Spiritual Assessment begun in [facility] IMCU through conversation with:         [x]Patient        [] Family    [] Friend(s)        Reason for Consult: Initial/Spiritual assessment, patient floor, Request by patient     Spiritual beliefs: (Please include comment if needed)     [x] Identifies with a lencho tradition: Scientologist        [] Supported by a lencho community:            [] Claims no spiritual orientation:           [] Seeking spiritual identity:                [] Adheres to an individual form of spirituality:           [] Not able to assess:                           Identified resources for coping:      [x] Prayer                               [] Music                  [] Guided Imagery     [] Family/friends                 [] Pet visits     [] Devotional reading                         [] Unknown     [] Other:                                               Interventions offered during this visit: (See comments for more details)    Patient Interventions: Affirmation of emotions/emotional suffering, Affirmation of lencho, Catharsis/review of pertinent events in supportive environment, Coping skills reviewed/reinforced, Iconic (affirming the presence of God/Higher Power), Normalization of emotional/spiritual concerns, Prayer (actual)           Plan of Care:     [] Support spiritual and/or cultural needs    [] Support AMD and/or advance care planning process      [] Support grieving process   [] Coordinate Rites and/or Rituals    [] Coordination with community clergy   [] No spiritual needs identified at this time   [] Detailed Plan of Care below (See Comments)  [] Make referral to Music Therapy  [] Make referral to Pet Therapy     [] Make referral to Addiction services  [] Make referral to [facility]  [] Make referral to Spiritual Care Partner  [] No future visits requested        [x] Follow up upon further referrals     Comments:  visit for initial spiritual assessment. Patient requested  visit. Patient reclining in bed, good eye contact, friendly. Provided spiritual presence and listening as he spoke of his present thoughts, feelings, and concerns. Says he has been feeling a little under the weather both physically and spiritually. Asked if it is wrong to ask God to take you home if God is ready. Says he is not hoping to die and would not do anything to take his own life or facilitate his death, but says he is ready to go when it is his time. Says he has been living at [facility] for a while but did not remember how long. Says he does not have any remaining family and spoke of the loss of his father as losing his only family support. Spoke of his lencho and described an active lencho and trust in God. Provided safe environment for him to express his emotions and feelings. Afterward, he requested prayer and a prayer was offered. Said he felt much better and only needed someone to talk to. Informed him of availability of  and pastoral care services including [employer] [facility] and visits from [address] [place] [address] and staff .  Bonnie Frankel he is looking forward to their visits. He appeared comforted and encouraged as a result of this visit and prayer and expressed gratitude for this visit. Rev.  Bailey Lamb MDiv, [facility], [facility]   paging service: [phone])

## 2021-09-30 NOTE — WOUND CARE
WOCN Note:     New wound care consult placed for wounds to buttocks    Chart shows:  Patient admitted on 9/29/21 with Respiratory failure with hypoxia  PMHX: diabetes, HTN    Assessment:   A&O x 4, Appropriately conversational   Reports no pain   Mobility: Independent  with repositioning. Able to turn independently from side to side in bed   Continence: Continent of urine and stool    Last Neymar Score: 17  Surface: foam mattress  Diet: regular 3 carb choice    From [facility]    Bilateral heel, and sacral skin intact and without erythema   Heels offloaded with pillows     1. POA: 2 healed wounds with peeling skin to bilateral buttocks   Etiology: Friction? Left 0.8 x 0.8 cm  Right 0.7 x 0.8 cm   Periwound intact with mild blanchable erythema    Wound Recommendations:      Venelex to buttocks TID    PI Prevention:  Turn/reposition approximately every 2 hours  Offload heels with pillows at all times in bed. Sacral Foam dressing: lift to assess regularly; change as needed. Discontinue if incontinent. Keep HOB 30 degrees or less to decrease shearing and pressure unless medically contraindicated.  If HOB is to be over 30 degrees, raise knees first then [facility] to prevent sliding   Minimize layers of linen/pads under patient to optimize support surface to one sheet and one incontinence pad     Orders received from Dr. Patrick Jean  Discussed with [facility]    Transition of Care: Plan to follow weekly and as needed while admitted to hospital.     Richard Ojeda MSN, RN, [facility], [address] [place] [address]  Certified Wound and Ostomy Nurse  office [phone]  Can be reached through [address] [place] [address]  pager [phone] or call  to page

## 2021-09-30 NOTE — ROUTINE PROCESS
TRANSFER - OUT REPORT:    Verbal report given to Heike Hernandez (name) on Ayleen Boothe  being transferred to [address] [place] [address](unit) for routine progression of care       Report consisted of patients Situation, Background, Assessment and   Recommendations(SBAR). Information from the following report(s) SBAR, ED Summary, Intake/Output, MAR and Recent Results was reviewed with the receiving nurse. Lines:   Peripheral IV 09/29/21 Right Forearm (Active)   Site Assessment Clean, dry, & intact 09/29/21 2024   Phlebitis Assessment 0 09/29/21 2024   Infiltration Assessment 0 09/29/21 2024   Dressing Status Clean, dry, & intact 09/29/21 2024        Opportunity for questions and clarification was provided.       Patient transported with:   [facility]

## 2021-09-30 NOTE — PROGRESS NOTES
[facility] [facility] Adult  Hospitalist Group                                                                                          Hospitalist Progress Note  Gavi Webster MD  Answering service: [phone] from in house phone        Date of Service:  2021  NAME:  Kathleen Chavarria  :  890  MRN:  [id number]      Admission Summary:   Kathleen Chavarria is a 77 y.o. male w/ hx of mdd w/ SERGEI, htn, glaucoma, NIDDM II, Dyslipidemia who is referred to hospital from [address] [place] [address] for hypoxia. Incidental hypoxia noted during routine vitals. Pt states he feels well otherwise and had no acute complaints or concerns. Reported hypoxia to 60s prior to EMS arrival.  Concerned for \"lesions\" on his buttocks noted about a week ago, recurrent and intermittently painful. Saw his PCP abiut these lesion and was prescribed a cream. Denies melena or dyschezia. The patient denies any fever, cough, chills, chest or abdominal pain, nausea, vomiting, cough, congestion, recent illness, palpitations, or dysuria.     Remarkable vitals on ER Presentations: bp 158/98, o2 sats 98% on 8L Non-rebreather  Labs Remarkable for: wbc 15, k 3.3, glu 165, rapid covid neg  ER Images: Patchy densities in the left mid and lower thorax and right base could represent  atelectasis or airspace disease. ER Rx: rocephin 1g    Interval history / Subjective: Follow up shortness of breath, hypoxia. Patient seen and examined at the bedside. Labs, images and notes reviewed  Discussed with nursing staff, orders reviewed. Plan discussed with patient/Family  Patient is feeling okay. Off BiPAP this morning. Overnight events noted, was on BiPAP with abnormal ABGs as noted. Currently on 5 LPM O2 NC. Does not recall reason for hospitalization as well as being on BiPAP. Currently alert, oriented x3 and clear headed. Able to answer questions appropriately. Following commands appropriately.   Denied any chest pain, shortness of breath, palpitation, dizziness, lightheadedness. No congestion feeling. Does not have home oxygen or any oxygen needs before this hospitalization. Assessment & Plan:     #Shortness of breath, infection VS atelectasis VS pulmonary edema  #Acute hypoxic and hypercarbic respiratory failure as noted on ABG 9/30/2021, overnight on BiPAP since late night/early this morning. Currently off BiPAP. On 5 LPM O2 NC  #Left upper lobe infiltrate with bilateral hilar and mediastinal lymphadenopathylikely reactive VS other lymphomatous condition, pneumonia/pneumonitis VS pulmonary edema  #HTN  #HLD  #Hypokalemia  #DM2, controlled  #MDD with SERGEI  #Glaucoma  #Obesity, weight 211 LB, pending height assessment and BMI assessment    -Admitted to inpatient, telemetry monitoring in IMCU  -Sick patient  -BiPAP as needed  -Repeat CXR, ABG, procalcitonin, proBNP  -Echocardiogram  -Currently on IV antibioticsRocephin and doxycycline. On admission, procalcitonin <0.05. If repeat PCT unchanged, consider discontinuing antibiotics and monitor off it  -Monitor I/os. -Based on recheck of proBNP, consider diuresis VS follow echocardiogram results and clinical evolution  -Consider pulmonology consultation  -Per pathology, recommend hematooncology consult for lymphoma concern. Requested today 9/30.  -Continue CBC, BMP/CMP monitoring  -Continue other home medications as already ordered. -D/W pharmacy team    Guarded prognosis. Code status: DNR  DVT prophylaxis: Heparin SQ    Care Plan discussed with: Patient/Family, Nurse and   Anticipated Disposition: Home w/Family and TBD as per PT OT  Anticipated Discharge: Greater than 48 hours     Hospital Problems  Never Reviewed        Codes Class Noted POA    Respiratory failure with hypoxia [facility]) ICD-10-CM: J96.91  ICD-9-CM: 518.81  9/29/2021 Unknown                Review of Systems:   A comprehensive review of systems was negative except for that written in the HPI.        Vital Signs:    Last 24hrs VS reviewed since prior progress note. Most recent are:  Visit Vitals  BP (!) 153/71   Pulse 69   Temp 98.4 °F (36.9 °C)   Resp 18   Wt 95.7 kg (211 lb)   SpO2 96%         Intake/Output Summary (Last 24 hours) at 9/30/2021 1339  Last data filed at 9/30/2021 1308  Gross per 24 hour   Intake    Output 350 ml   Net -350 ml        Physical Examination:     I had a face to face encounter with this patient and independently examined them on 9/30/2021 as outlined below:          Constitutional:  No acute distress, cooperative, pleasant    ENT:  Oral mucosa moist, oropharynx benign. Resp:  CTA bilaterally. No wheezing/rhonchi/rales. No accessory muscle use   CV:  Regular rhythm, normal rate, no murmurs, gallops, rubs    GI:  Soft, non distended, non tender. normoactive bowel sounds, no hepatosplenomegaly     Musculoskeletal:  No edema, warm, 2+ pulses throughout    Neurologic:  Moves all extremities. AAOx3, CN II-XII reviewed            Data Review:    Review and/or order of clinical lab test  Review and/or order of tests in the radiology section of CPT  Review and/or order of tests in the medicine section of CPT    CTA CHEST W OR W WO CONT    Result Date: 9/29/2021  1. Focal airspace infiltrate anterior left upper lobe suspicious for possible focus of pneumonia with nonspecific broad areas of groundglass opacity which may reflect pulmonary edema, differential aeration, pneumonitis, or infectious process. 2. Hilar and mediastinal adenopathy which may be reactive and for which nonemergent follow-up is recommended. 3. Additional incidentals as above including coronary artery disease. XR CHEST PORT    Result Date: 9/30/2021  1. No interval change     XR CHEST PORT    Result Date: 9/29/2021  Depth of inspiration is shallow. There are linear densities and patchy densities in the left mid and lower thorax and right base could represent atelectasis or airspace disease.       Labs:     Recent Labs 09/30/21 0356 09/29/21  1755   WBC 12.4* 15.0*   HGB 14.3 14.8   HCT 48.7 47.9   * 115*     Recent Labs     09/30/21  0356 09/29/21  1755    143   K 3.5 3.3*    102   CO2 36* 39*   BUN 13 14   CREA 0.80 1.08   * 165*   CA 7.9* 8.5     Recent Labs     09/29/21  1755   ALT 34   *   TBILI 0.6   TP 6.3*   ALB 3.3*   GLOB 3.0     No results for input(s): INR, PTP, APTT, INREXT in the last 72 hours. No results for input(s): FE, TIBC, PSAT, FERR in the last 72 hours. No results found for: FOL, RBCF   No results for input(s): PH, PCO2, PO2 in the last 72 hours.   Recent Labs     09/29/21 1755   TROIQ <0.05     No results found for: CHOL, CHOLX, CHLST, CHOLV, HDL, HDLP, LDL, LDLC, DLDLP, TGLX, TRIGL, TRIGP, CHHD, CHHDX  No results found for: [facility]  Lab Results   Component Value Date/Time    Color YELLOW/STRAW 09/29/2021 06:20 PM    Appearance CLEAR 09/29/2021 06:20 PM    Specific gravity 1.021 09/29/2021 06:20 PM    pH (UA) 6.5 09/29/2021 06:20 PM    Protein TRACE (A) 09/29/2021 06:20 PM    Glucose Negative 09/29/2021 06:20 PM    Ketone Negative 09/29/2021 06:20 PM    Bilirubin Negative 09/29/2021 06:20 PM    Urobilinogen 1.0 09/29/2021 06:20 PM    Nitrites Negative 09/29/2021 06:20 PM    Leukocyte Esterase MODERATE (A) 09/29/2021 06:20 PM    Epithelial cells FEW 09/29/2021 06:20 PM    Bacteria Negative 09/29/2021 06:20 PM    WBC 0-4 09/29/2021 06:20 PM    RBC 0-5 09/29/2021 06:20 PM         Medications Reviewed:     Current Facility-Administered Medications   Medication Dose Route Frequency    [START ON 10/1/2021] cefTRIAXone (ROCEPHIN) 1 g in sterile water (preservative free) 10 mL IV syringe  1 g IntraVENous Q24H    doxycycline (VIBRAMYCIN) 100 mg in 0.9% sodium chloride (MBP/ADV) 100 mL MBP  100 mg IntraVENous Q12H    busPIRone (BUSPAR) tablet 10 mg  10 mg Oral BID    FLUoxetine (PROzac) capsule 60 mg  60 mg Oral DAILY    atorvastatin (LIPITOR) tablet 20 mg  20 mg Oral QHS    lisinopriL (PRINIVIL, ZESTRIL) tablet 10 mg  10 mg Oral DAILY    senna-docusate (PERICOLACE) 8.6-50 mg per tablet 1 Tablet  1 Tablet Oral BID    traZODone (DESYREL) tablet 100 mg  100 mg Oral QHS    brimonidine (ALPHAGAN) 0.2 % ophthalmic solution 1 Drop  1 Drop Both Eyes DAILY    latanoprost (XALATAN) 0.005 % ophthalmic solution 1 Drop  1 Drop Both Eyes QPM    ALPRAZolam (XANAX) tablet 1 mg  1 mg Oral BID PRN    ARIPiprazole (ABILIFY) tablet 2 mg  2 mg Oral DAILY    sodium chloride (NS) flush 5-40 mL  5-40 mL IntraVENous Q8H    sodium chloride (NS) flush 5-40 mL  5-40 mL IntraVENous PRN    acetaminophen (TYLENOL) tablet 650 mg  650 mg Oral Q6H PRN    Or    acetaminophen (TYLENOL) suppository 650 mg  650 mg Rectal Q6H PRN    polyethylene glycol (MIRALAX) packet 17 g  17 g Oral DAILY PRN    ondansetron (ZOFRAN ODT) tablet 4 mg  4 mg Oral Q8H PRN    Or    ondansetron (ZOFRAN) injection 4 mg  4 mg IntraVENous Q6H PRN    0.9% sodium chloride infusion  100 mL/hr IntraVENous CONTINUOUS    heparin (porcine) injection 5,000 Units  5,000 Units SubCUTAneous Q8H     ______________________________________________________________________  EXPECTED LENGTH OF STAY: - - -  ACTUAL LENGTH OF STAY:          1                 Luiz Card MD

## 2021-09-30 NOTE — PROGRESS NOTES
TRANSFER - IN REPORT:    Verbal report received from Geovanna(name) on Paras Bolton  being received from ED(unit) for routine progression of care      Report consisted of patients Situation, Background, Assessment and   Recommendations(SBAR). Information from the following report(s) SBAR, Kardex, [facility] and Recent Results was reviewed with the receiving nurse. Opportunity for questions and clarification was provided. Assessment completed upon patients arrival to unit and care assumed. PT vitals stable, ambulated to bed from stretched. No complaints at this time. Educated on fall precautions and oriented to room. Call bell and personal items within reach.

## 2021-09-30 NOTE — PROGRESS NOTES
Rounded on Mandaen patients and provided Anointing of the Sick and Communion at request of patient.     Maicol Luong

## 2021-09-30 NOTE — PROGRESS NOTES
Clinical Pharmacy Note: Ceftriaxone Dosing    Please note that the ceftriaxone dose for Naga Garza has been changed to 1000 mg IV q24h per [facility]-approved protocol. Please contact the pharmacy with any questions.     SHAMAR Arrieta

## 2021-09-30 NOTE — PROGRESS NOTES
[facility] [place] [facility] [address], [place]: [phone]  F: [phone]    Reason for Evaluation   Vamsi Apodaca is a 77 y.o. male who is seen as a new patient for evaluation of abnormal peripheral smear - atypical lymphocytes. Treatment History:   · None thus far     History of Present Illness:     Mr. Jovanny Worley is a pleasant 77 y.o.   old male who presented to [facility] from [facility] with hypoxia. This was reportedly found on routine vital signs. CTA with focal airspace infiltrate anterior left upper lobe suspicious for possible focus of pneumonia with nonspecific broad areas of groundglass opacity. Hilar and mediastinal adenopathy which may be reactive. CXR today with no interval change. He denies any changes in appetite, weight, night sweats, abdominal pain. CBC with elevated WBC at 12.4, normal Hgb, and mildly decreased platelets. Peripheral smear obtained and resulting with atypical lymphocytes. PMH and PSH    HTN  Glaucoma  NIDDM     Social History     Tobacco Use    Smoking status: Not on file   Substance Use Topics    Alcohol use: Not on file      No family history on file.   Current Facility-Administered Medications   Medication Dose Route Frequency    [START ON 10/1/2021] cefTRIAXone (ROCEPHIN) 1 g in sterile water (preservative free) 10 mL IV syringe  1 g IntraVENous Q24H    doxycycline (VIBRAMYCIN) 100 mg in 0.9% sodium chloride (MBP/ADV) 100 mL MBP  100 mg IntraVENous Q12H    busPIRone (BUSPAR) tablet 10 mg  10 mg Oral BID    FLUoxetine (PROzac) capsule 60 mg  60 mg Oral DAILY    atorvastatin (LIPITOR) tablet 20 mg  20 mg Oral QHS    lisinopriL (PRINIVIL, ZESTRIL) tablet 10 mg  10 mg Oral DAILY    senna-docusate (PERICOLACE) 8.6-50 mg per tablet 1 Tablet  1 Tablet Oral BID    traZODone (DESYREL) tablet 100 mg  100 mg Oral QHS    brimonidine (ALPHAGAN) 0.2 % ophthalmic solution 1 Drop  1 Drop Both Eyes DAILY  latanoprost (XALATAN) 0.005 % ophthalmic solution 1 Drop  1 Drop Both Eyes QPM    ALPRAZolam (XANAX) tablet 1 mg  1 mg Oral BID PRN    ARIPiprazole (ABILIFY) tablet 2 mg  2 mg Oral DAILY    sodium chloride (NS) flush 5-40 mL  5-40 mL IntraVENous Q8H    sodium chloride (NS) flush 5-40 mL  5-40 mL IntraVENous PRN    acetaminophen (TYLENOL) tablet 650 mg  650 mg Oral Q6H PRN    Or    acetaminophen (TYLENOL) suppository 650 mg  650 mg Rectal Q6H PRN    polyethylene glycol (MIRALAX) packet 17 g  17 g Oral DAILY PRN    ondansetron (ZOFRAN ODT) tablet 4 mg  4 mg Oral Q8H PRN    Or    ondansetron (ZOFRAN) injection 4 mg  4 mg IntraVENous Q6H PRN    0.9% sodium chloride infusion  100 mL/hr IntraVENous CONTINUOUS    heparin (porcine) injection 5,000 Units  5,000 Units SubCUTAneous Q8H      No Known Allergies     Review of Systems: A complete review of systems was obtained, negative except as described above. Physical Exam:     Visit Vitals  BP (!) 153/71   Pulse 72   Temp 98.4 °F (36.9 °C)   Resp 18   Wt 211 lb (95.7 kg)   SpO2 96%     General: elderly, frail   Eyes: watery  HENT: dry mucous membranes  Neck: Supple  Respiratory: normal effort on 2L  CV: HRR  GI: Soft, nontender   MS: moving all extremities in bed  Skin: warm  Psych: alert, appropriate    Results:     Lab Results   Component Value Date/Time    WBC 12.4 (H) 09/30/2021 03:56 AM    HGB 14.3 09/30/2021 03:56 AM    HCT 48.7 09/30/2021 03:56 AM    PLATELET 021 (L) 27/64/1134 03:56 AM    MCV 98.4 09/30/2021 03:56 AM    ABS.  NEUTROPHILS 2.9 09/30/2021 03:56 AM     Lab Results   Component Value Date/Time    Sodium 143 09/30/2021 03:56 AM    Potassium 3.5 09/30/2021 03:56 AM    Chloride 104 09/30/2021 03:56 AM    CO2 36 (H) 09/30/2021 03:56 AM    Glucose 160 (H) 09/30/2021 03:56 AM    BUN 13 09/30/2021 03:56 AM    Creatinine 0.80 09/30/2021 03:56 AM    GFR est AA >60 09/30/2021 03:56 AM    GFR est non-AA >60 09/30/2021 03:56 AM    Calcium 7.9 (L) 09/30/2021 03:56 AM     Lab Results   Component Value Date/Time    Bilirubin, total 0.6 09/29/2021 05:55 PM    ALT (SGPT) 34 09/29/2021 05:55 PM    Alk. phosphatase 173 (H) 09/29/2021 05:55 PM    Protein, total 6.3 (L) 09/29/2021 05:55 PM    Albumin 3.3 (L) 09/29/2021 05:55 PM    Globulin 3.0 09/29/2021 05:55 PM       Peripheral Smear 9/29/21  Peripheral blood smear:        Atypical lymphocytosis, see comment   Mild thrombocytopenia   Red blood cells unremarkable in number and morphology with no increase in   schistocytes       * * *Comment* * *     The atypical lymphocytes are suspicious for involvement by lymphoma. Please correlate with pending flow cytometry studies. Records reviewed and summarized above. Pathology report(s) reviewed above. Radiology report(s) reviewed above. Assessment:   1) Atypical lymphocytes, Lymphocytosis and adenopathy    No concerning systemic symptoms, no B symptoms  Persistent Lymphocytosis  CLL/ Mantle/ FL/MZL suspected     Will obtain hepatitis B, hep C, LD, and immunophenotype peripherally   Not likely to need any systemic therapy any time soon    2) Respiratory failure  Unclear etiology - imaging with infection vs atelectasis  Antibiotics per Hospitalist    3) HTN    4) DMII    Plan: Will obtain hepatitis B, hep C, LD, and immunophenotype peripheral blood    I appreciate the opportunity to participate in . [facility] care. I performed a history and physical examination of the patient and discussed his management with the NPP.  I reviewed the NPP note and agree with the documented findings and plan of care      Patient admitted with SOB  Has slightly enlarged nodes, some lymphocytosis suspicious for CLL  Work up as above  Can be addressed OP    Has no palpable nodes or splenomegaly  Signed By: Kanchan Nicolas MD

## 2021-09-30 NOTE — PROGRESS NOTES
Admission Medication Reconciliation:    Information obtained from:  [address] [place] [address] [address]:  YES    Comments/Recommendations: Updated PTA meds/reviewed patient's allergies. 1)  Obtained medication records from assisted living facility via fax. Seems like a reliable historian. 2)  Medication changes (since last review): Added  - Aripiprazole  - Acetaminophen  - Brimidone tartrate eye drops  - Buspirone  - Fluoxetine  - Atorvastatin  - Lisinopril  - Metformin  - Saline nasal spray  - Senokot S  - Sertraline  - Trazodone  - Vascepa  - Latanoprost  - Alprazolam   ¹[employer] pharmacy benefit data reflects medications filled and processed through the patient's insurance, however   this data does NOT capture whether the medication was picked up or is currently being taken by the patient. Allergies:  Patient has no known allergies. Significant PMH/Disease States: No past medical history on file. Chief Complaint for this Admission:    Chief Complaint   Patient presents with    Shortness of Breath     Prior to Admission Medications:   Prior to Admission Medications   Prescriptions Last Dose Informant Taking? ALPRAZolam (XANAX) 1 mg tablet   Yes   Sig: Take 1 Tablet by mouth two (2) times a day. ARIPiprazole (ABILIFY) 2 mg tablet   Yes   Sig: Take 1 Tablet by mouth daily. FLUoxetine 60 mg tab   Yes   Sig: Take 1 Tablet by mouth daily. acetaminophen (TYLENOL) 325 mg tablet   Yes   Sig: Take 650 mg by mouth two (2) times a day. atorvastatin (LIPITOR) 20 mg tablet   Yes   Sig: Take 30 mg by mouth nightly. brimonidine (ALPHAGAN) 0.2 % ophthalmic solution   Yes   Sig: Administer 1 Drop to both eyes daily. busPIRone (BUSPAR) 10 mg tablet   Yes   Sig: Take 1 Tablet by mouth two (2) times a day. icosapent ethyL (VASCEPA) 1 gram capsule   Yes   Sig: Take 1 Capsule by mouth daily.    latanoprost (XALATAN) 0.005 % ophthalmic solution   Yes   Sig: Administer 1 Drop to both eyes daily. lisinopriL (PRINIVIL, ZESTRIL) 10 mg tablet   Yes   Sig: Take 1 Tablet by mouth daily. metFORMIN (GLUCOPHAGE) 500 mg tablet   Yes   Sig: Take 0.5 Tablets by mouth two (2) times a day. senna-docusate (PERICOLACE) 8.6-50 mg per tablet   Yes   Sig: Take 1 Tablet by mouth every twelve (12) hours as needed for Constipation. sertraline (ZOLOFT) 100 mg tablet   Yes   Sig: Take 1 Tablet by mouth daily. sodium chloride (OCEAN) 0.65 % nasal squeeze bottle   Yes   Si Sprays by Both Nostrils route two (2) times a day. traZODone (DESYREL) 100 mg tablet   Yes   Sig: Take 1 Tablet by mouth nightly. Facility-Administered Medications: None     Please contact the main inpatient pharmacy with any questions or concerns at [phone] and we will direct you to the clinical pharmacist covering this patient's care while in-house.      SHAMAR Arias

## 2021-09-30 NOTE — PROGRESS NOTES
Problem: Mobility Impaired (Adult and Pediatric)  Goal: *Acute Goals and Plan of Care (Insert Text)  Description: FUNCTIONAL STATUS PRIOR TO ADMISSION: Patient was modified independent using a rolling walker for functional mobility. HOME SUPPORT PRIOR TO ADMISSION: The patient lived [facility]. Physical Therapy Goals  Initiated 9/30/2021  1. Patient will move from supine to sit and sit to supine in bed with independence within 7 day(s). 2.  Patient will transfer from bed to chair and chair to bed with modified independence using the least restrictive device within 7 day(s). 3.  Patient will perform sit to stand with minimal assistance/contact guard assist within 7 day(s). 4.  Patient will ambulate with minimal assistance/contact guard assist for 120 feet with the least restrictive device within 7 day(s). Outcome: Progressing Towards Goal   PHYSICAL THERAPY EVALUATION  Patient: Kathryn Contreras (52 y.o. male)  Date: 9/30/2021  Primary Diagnosis: Respiratory failure with hypoxia ([facility] [address] [J96.91]        Precautions:        ASSESSMENT  Based on the objective data described below, the patient presents with decreased independence with functional mobility S/P respiratory failure with hypoxia. He is received supine in bed on supplemental 5L/min O2 with stable sats. Patient demonstrates stable vitals throughout mobility. He transitions sit<>stand with moderate assistance requiring increased time to achieve upright posture. Patient demonstrates increased hip, knee and trunk flexion in standing. He demonstrates increased difficulty with weight shifting and clearing his feet pivoting without the use of a RW. Patient also reports wounds on his buttocks making sitting uncomfortable. Waffle cushion is obtained and placed in bed to improve comfort. Patient is left in bed with all needs met.   is called to visit at patient request.     Current Level of Function Impacting Discharge (mobility/balance): Min A 1-2 stand pivot without AD     Functional Outcome Measure: The patient scored 25/100 on the Barthel outcome measure. Other factors to consider for discharge: medical stability, decreased balance, decreased strength/endurance      Patient will benefit from skilled therapy intervention to address the above noted impairments. PLAN :  Recommendations and Planned Interventions: bed mobility training, transfer training, gait training, therapeutic exercises, neuromuscular re-education, edema management/control, patient and family training/education, and therapeutic activities      Frequency/Duration: Patient will be followed by physical therapy:  5 times a week to address goals. Recommendation for discharge: (in order for the patient to meet his/her long term goals)  Therapy up to 5 days/week in SNF setting    This discharge recommendation:  Has been made in collaboration with the attending provider and/or case management    IF patient discharges home will need the following DME: patient owns DME required for discharge         SUBJECTIVE:   Patient stated Vitaly Conway got one foot in the grave.     OBJECTIVE DATA SUMMARY:   HISTORY:    No past medical history on file. No past surgical history on file. Personal factors and/or comorbidities impacting plan of care: please see above    Home Situation  Home Environment: Assisted living (nursing home)  [address] [place] [address] [facility]  One/Two Story Residence: One story  Current DME Used/Available at Home: Wong Edmond, rolling  Tub or Shower Type: Shower    EXAMINATION/PRESENTATION/DECISION MAKING:   Critical Behavior:  Neurologic State: Alert, Appropriate for age  Orientation Level: Oriented X4  Cognition: Follows commands  Safety/Judgement: Awareness of environment, Fall prevention  Hearing:   Auditory  Auditory Impairment: None  Skin:    Edema:   Range Of Motion:  AROM: Generally decreased, functional                       Strength:    Strength: Generally decreased, functional                    Tone & Sensation:                                  Coordination:  Coordination: Within functional limits  Vision:      Functional Mobility:  Bed Mobility:     Supine to Sit: Minimum assistance; Additional time;Bed Modified  Sit to Supine: Minimum assistance     Transfers:  Sit to Stand: Moderate assistance  Stand to Sit: Moderate assistance        Bed to Chair: Minimum assistance; Moderate assistance;Assist x2 (w/ HHA for stand-pivot to [facility])              Balance:   Sitting: Impaired  Sitting - Static: Good (unsupported)  Sitting - Dynamic: Fair (occasional)  Standing: Impaired  Standing - Static: Fair  Standing - Dynamic : Fair;Poor  Ambulation/Gait Training:                                                         Stairs: Therapeutic Exercises:       Functional Measure:  Barthel Index:    Bathin  Bladder: 5  Bowels: 5  Groomin  Dressin  Feedin  Mobility: 0  Stairs: 0  Toilet Use: 5  Transfer (Bed to Chair and Back): 5  Total: 25/100       The Barthel ADL Index: Guidelines  1. The index should be used as a record of what a patient does, not as a record of what a patient could do. 2. The main aim is to establish degree of independence from any help, physical or verbal, however minor and for whatever reason. 3. The need for supervision renders the patient not independent. 4. A patient's performance should be established using the best available evidence. Asking the patient, friends/relatives and nurses are the usual sources, but direct observation and common sense are also important. However direct testing is not needed. 5. Usually the patient's performance over the preceding 24-48 hours is important, but occasionally longer periods will be relevant. 6. Middle categories imply that the patient supplies over 50 per cent of the effort. 7. Use of aids to be independent is allowed. Nasra Gann., Barthel, D.W. ([id number]).  Functional evaluation: the Barthel Index. [facility] (14)2. RODNEY ArreolaF, Daniel Valadez., Terrence Mark., [place], [address] (1999). Measuring the change indisability after inpatient rehabilitation; comparison of the responsiveness of the Barthel Index and Functional [place] Measure. Journal of Neurology, Neurosurgery, and Psychiatry, 66(4), 903-478. Marcos GHAZALA Cool, MACRINA Ellis, & Fam Lindsey M.A. (2004.) Assessment of post-stroke quality of life in cost-effectiveness studies: The usefulness of the Barthel Index and the EuroQoL-5D. Quality of Life Research, 15, 401-67            Physical Therapy Evaluation Charge Determination   History Examination Presentation Decision-Making   HIGH Complexity :3+ comorbidities / personal factors will impact the outcome/ POC  LOW Complexity : 1-2 Standardized tests and measures addressing body structure, function, activity limitation and / or participation in recreation  MEDIUM Complexity : Evolving with changing characteristics  Other outcome measures Barthel  HIGH       Based on the above components, the patient evaluation is determined to be of the following complexity level: HIGH     Pain Rating:      Activity Tolerance:   Fair    After treatment patient left in no apparent distress:   Supine in bed, Call bell within reach, and Side rails x 3    COMMUNICATION/EDUCATION:   The patients plan of care was discussed with: Occupational therapist and Registered nurse. Fall prevention education was provided and the patient/caregiver indicated understanding., Patient/family have participated as able in goal setting and plan of care. , and Patient/family agree to work toward stated goals and plan of care.     Thank you for this referral.  Asuncion No, PT   Time Calculation: 28 mins

## 2021-09-30 NOTE — PROGRESS NOTES
Problem: Self Care Deficits Care Plan (Adult)  Goal: *Acute Goals and Plan of Care (Insert Text)  Description:   FUNCTIONAL STATUS PRIOR TO ADMISSION: Patient resides at [facility]. Reports being independent in BADLs, to include showering. Patient is mod I for mobility. HOME SUPPORT: Patient from retirement. Occupational Therapy Goals  Initiated 9/30/2021  1. Patient will perform tolerate standing with RW support and CGA in prep for standing ADLs within 7 day(s). 2.  Patient will perform seated / standing bathing with minimal assistance/contact guard assist within 7 day(s). 3.  Patient will perform upper body dressing and lower body dressing with minimal assistance/contact guard assist using RW prn within 7 day(s). 4.  Patient will perform toilet transfers with CGA using RW prn within 7 day(s). 5.  Patient will perform all aspects of toileting with CGA using RW prn within 7 day(s). 6.  Patient will participate in upper extremity therapeutic exercise/activities with supervision/set-up for 7 minutes within 7 day(s). 7.  Patient will utilize energy conservation and fall prevention techniques during functional activities with verbal cues within 7 day(s). Outcome: Progressing Towards Goal   OCCUPATIONAL THERAPY EVALUATION  Patient: Kathryn Contreras (45 y.o. male)  Date: 9/30/2021  Primary Diagnosis: Respiratory failure with hypoxia ([facility] [address] [J96.91]        Precautions:        ASSESSMENT  Based on the objective data described below, the patient presents with generalized weakness, decreased activity tolerance, impaired functional mobility and balance, new need for supplemental O2, and buttocks pain. Patient received on 5L O2 with resting SpO2 96%. Patient's bed mobility most limited by pain with patient requiring overall min assist to achieve EOB. Patient requesting use of BSC, standing from EOB with mod assist and completing stand-pivot with min-mod assist x2.  Of note, with activity, patient SpO2 as low as 92%. Patient very fatigued s/p toileting, requesting return to bed. Patient agreeable to brief grooming and self-feeding tasks with HOB elevated. At this time, patient is well below reported baseline. Recommend discharge to SNF to maximize patient independence and facilitate return to baseline. Current Level of Function Impacting Discharge (ADLs/self-care): up to max A    Functional Outcome Measure: The patient scored 25/100 on the Barthel Index. Other factors to consider for discharge: reports independence - supervision in BAD at baseline     Patient will benefit from skilled therapy intervention to address the above noted impairments. PLAN :  Recommendations and Planned Interventions: self care training, functional mobility training, therapeutic exercise, balance training, therapeutic activities, endurance activities, patient education, and home safety training    Frequency/Duration: Patient will be followed by occupational therapy 5 times a week to address goals. Recommendation for discharge: (in order for the patient to meet his/her long term goals)  Therapy up to 5 days/week in SNF setting    This discharge recommendation:  Has not yet been discussed the attending provider and/or case management    IF patient discharges home will need the following DME: TBD       SUBJECTIVE:   Patient stated it's nice to have some company.     OBJECTIVE DATA SUMMARY:   HISTORY:   No past medical history on file. No past surgical history on file. Expanded or extensive additional review of patient history:     Home Situation  Home Environment: Assisted living (nursing home)  [facility]  One/Two Story Residence: One story  Current DME Used/Available at Home: Walker, rolling  Tub or Shower Type: Shower    Hand dominance: Right    EXAMINATION OF PERFORMANCE DEFICITS:  Cognitive/Behavioral Status:  Neurologic State: Alert; Appropriate for age  Orientation Level: Oriented X4  Cognition: Follows commands  Perception: Appears intact  Perseveration: No perseveration noted  Safety/Judgement: Awareness of environment; Fall prevention    Skin: wound to buttocks    Edema:     Hearing: Auditory  Auditory Impairment: None    Vision/Perceptual:     No overt deficits noted. Range of Motion:  AROM: Generally decreased, functional    Strength:  Strength: Generally decreased, functional    Coordination:  Coordination: Within functional limits  Fine Motor Skills-Upper: Left Intact; Right Intact    Gross Motor Skills-Upper: Left Intact; Right Intact    Tone & Sensation:  Patient reports diminished sensation to buttocks at wound site? Balance:  Sitting: Impaired  Sitting - Static: Good (unsupported)  Sitting - Dynamic: Fair (occasional)  Standing: Impaired  Standing - Static: Fair  Standing - Dynamic : Fair;Poor    Functional Mobility and Transfers for ADLs:  Bed Mobility:  Supine to Sit: Minimum assistance; Additional time;Bed Modified  Sit to Supine: Minimum assistance    Transfers:  Sit to Stand: Moderate assistance  Stand to Sit: Moderate assistance  Bed to Chair: Minimum assistance; Moderate assistance;Assist x2 (w/ HHA for stand-pivot to [facility])  Toilet Transfer : Minimum assistance; Moderate assistance;Assist x2 (w/ HHA for stand-pivot to [facility])    ADL Assessment:  Feeding: Setup;Stand-by assistance    Oral Facial Hygiene/Grooming: Setup;Stand-by assistance    Bathing: Moderate assistance    Upper Body Dressing: Moderate assistance    Lower Body Dressing: Maximum assistance    Toileting: Maximum assistance    ADL Intervention and task modifications:  Feeding  Feeding Assistance: Set-up; Stand-by assistance  Drink to Mouth: Stand-by assistance (full water pitcher with straw)  Cues: Verbal cues provided;Visual cues provided    Grooming  Grooming Assistance: Set-up; Stand-by assistance  Washing Face: Stand-by assistance    Upper Body Dressing Assistance  Dressing Assistance:  Moderate assistance (fatigued s/p OOB toileting)  [facility] Gown: Moderate assistance    Toileting  Toileting Assistance: Maximum assistance  Bladder Hygiene: Moderate assistance  Clothing Management: Maximum assistance  Cues: Verbal cues provided;Visual cues provided    Cognitive Retraining  Safety/Judgement: Awareness of environment; Fall prevention    Functional Measure:  Barthel Index:    Bathin  Bladder: 5  Bowels: 5  Groomin  Dressin  Feedin  Mobility: 0  Stairs: 0  Toilet Use: 5  Transfer (Bed to Chair and Back): 5  Total: 25/100        The Barthel ADL Index: Guidelines  1. The index should be used as a record of what a patient does, not as a record of what a patient could do. 2. The main aim is to establish degree of independence from any help, physical or verbal, however minor and for whatever reason. 3. The need for supervision renders the patient not independent. 4. A patient's performance should be established using the best available evidence. Asking the patient, friends/relatives and nurses are the usual sources, but direct observation and common sense are also important. However direct testing is not needed. 5. Usually the patient's performance over the preceding 24-48 hours is important, but occasionally longer periods will be relevant. 6. Middle categories imply that the patient supplies over 50 per cent of the effort. 7. Use of aids to be independent is allowed. Destinee Ha., Barthel, D.W. ([id number]). Functional evaluation: the Barthel Index. [address] [facility] (14)2. MARIA LUISA Wilson, Yvette Ham., Wu Alejo., [place], [address] [place] [address] (). Measuring the change indisability after inpatient rehabilitation; comparison of the responsiveness of the Barthel Index and Functional [place] Measure. Journal of Neurology, Neurosurgery, and Psychiatry, 66(4), 966-698. Mary Patel, N.J.A, Carlos Jordan,  W.J.M, & Sal Horan M.A. (2004.) Assessment of post-stroke quality of life in cost-effectiveness studies:  The usefulness of the Barthel Index and the EuroQoL-5D. Quality of Life Research, 15, 521-86         Occupational Therapy Evaluation Charge Determination   History Examination Decision-Making   LOW Complexity : Brief history review  MEDIUM Complexity : 3-5 performance deficits relating to physical, cognitive , or psychosocial skils that result in activity limitations and / or participation restrictions MEDIUM Complexity : Patient may present with comorbidities that affect occupational performnce. Miniml to moderate modification of tasks or assistance (eg, physical or verbal ) with assesment(s) is necessary to enable patient to complete evaluation       Based on the above components, the patient evaluation is determined to be of the following complexity level: LOW   Pain Rating:  Patient reporting generalized pain, discomfort. Activity Tolerance:   Fair and requires frequent rest breaks    After treatment patient left in no apparent distress:    Supine in bed, Call bell within reach, and Side rails x 3    COMMUNICATION/EDUCATION:   The patients plan of care was discussed with: Physical therapist and Registered nurse. Home safety education was provided and the patient/caregiver indicated understanding., Patient/family have participated as able in goal setting and plan of care. , and Patient/family agree to work toward stated goals and plan of care.     Thank you for this referral.  Patrica Morton, OT  Time Calculation: 30 mins

## 2021-10-01 LAB
ALBUMIN SERPL-MCNC: 3 G/DL (ref 3.5–5)
ALBUMIN/GLOB SERPL: 1.1 {RATIO} (ref 1.1–2.2)
ALP SERPL-CCNC: 126 U/L (ref 45–117)
ALT SERPL-CCNC: 25 U/L (ref 12–78)
ANION GAP SERPL CALC-SCNC: 4 MMOL/L (ref 5–15)
ARTERIAL PATENCY WRIST A: ABNORMAL
AST SERPL-CCNC: 25 U/L (ref 15–37)
BASE EXCESS BLD CALC-SCNC: 7.5 MMOL/L
BASOPHILS # BLD: 0.1 K/UL (ref 0–0.1)
BASOPHILS NFR BLD: 1 % (ref 0–1)
BDY SITE: ABNORMAL
BILIRUB SERPL-MCNC: 0.6 MG/DL (ref 0.2–1)
BUN SERPL-MCNC: 9 MG/DL (ref 6–20)
BUN/CREAT SERPL: 14 (ref 12–20)
CALCIUM SERPL-MCNC: 8.3 MG/DL (ref 8.5–10.1)
CHLORIDE SERPL-SCNC: 102 MMOL/L (ref 97–108)
CO2 SERPL-SCNC: 34 MMOL/L (ref 21–32)
CREAT SERPL-MCNC: 0.65 MG/DL (ref 0.7–1.3)
DIFFERENTIAL METHOD BLD: ABNORMAL
EOSINOPHIL # BLD: 0.2 K/UL (ref 0–0.4)
EOSINOPHIL NFR BLD: 2 % (ref 0–7)
ERYTHROCYTE [DISTWIDTH] IN BLOOD BY AUTOMATED COUNT: 13.2 % (ref 11.5–14.5)
GAS FLOW.O2 O2 DELIVERY SYS: ABNORMAL L/MIN
GLOBULIN SER CALC-MCNC: 2.7 G/DL (ref 2–4)
GLUCOSE SERPL-MCNC: 137 MG/DL (ref 65–100)
HBV SURFACE AB SER QL: NONREACTIVE
HBV SURFACE AB SER-ACNC: <3.1 MIU/ML
HBV SURFACE AG SER QL: <0.1 INDEX
HBV SURFACE AG SER QL: NEGATIVE
HCO3 BLD-SCNC: 35.2 MMOL/L (ref 22–26)
HCT VFR BLD AUTO: 47.4 % (ref 36.6–50.3)
HCV AB SERPL QL IA: NONREACTIVE
HGB BLD-MCNC: 14.2 G/DL (ref 12.1–17)
IMM GRANULOCYTES # BLD AUTO: 0.2 K/UL (ref 0–0.04)
IMM GRANULOCYTES NFR BLD AUTO: 2 % (ref 0–0.5)
LDH SERPL L TO P-CCNC: 204 U/L (ref 85–241)
LYMPHOCYTES # BLD: 5.3 K/UL (ref 0.8–3.5)
LYMPHOCYTES NFR BLD: 52 % (ref 12–49)
MCH RBC QN AUTO: 29.1 PG (ref 26–34)
MCHC RBC AUTO-ENTMCNC: 30 G/DL (ref 30–36.5)
MCV RBC AUTO: 97.1 FL (ref 80–99)
MONOCYTES # BLD: 0.5 K/UL (ref 0–1)
MONOCYTES NFR BLD: 5 % (ref 5–13)
NEUTS SEG # BLD: 3.9 K/UL (ref 1.8–8)
NEUTS SEG NFR BLD: 39 % (ref 32–75)
NRBC # BLD: 0 K/UL (ref 0–0.01)
NRBC BLD-RTO: 0 PER 100 WBC
PCO2 BLD: 60.6 MMHG (ref 35–45)
PH BLD: 7.37 [PH] (ref 7.35–7.45)
PLATELET # BLD AUTO: 99 K/UL (ref 150–400)
PO2 BLD: 75 MMHG (ref 80–100)
POTASSIUM SERPL-SCNC: 3.6 MMOL/L (ref 3.5–5.1)
PROT SERPL-MCNC: 5.7 G/DL (ref 6.4–8.2)
RBC # BLD AUTO: 4.88 M/UL (ref 4.1–5.7)
SAO2 % BLD: 94 % (ref 92–97)
SODIUM SERPL-SCNC: 140 MMOL/L (ref 136–145)
SPECIMEN TYPE: ABNORMAL
WBC # BLD AUTO: 10.1 K/UL (ref 4.1–11.1)

## 2021-10-01 PROCEDURE — 74011250636 HC RX REV CODE- 250/636: Performed by: INTERNAL MEDICINE

## 2021-10-01 PROCEDURE — 82803 BLOOD GASES ANY COMBINATION: CPT

## 2021-10-01 PROCEDURE — 36600 WITHDRAWAL OF ARTERIAL BLOOD: CPT

## 2021-10-01 PROCEDURE — 97530 THERAPEUTIC ACTIVITIES: CPT

## 2021-10-01 PROCEDURE — 77030027138 HC INCENT SPIROMETER -A

## 2021-10-01 PROCEDURE — 77010033678 HC OXYGEN DAILY

## 2021-10-01 PROCEDURE — 74011000250 HC RX REV CODE- 250: Performed by: INTERNAL MEDICINE

## 2021-10-01 PROCEDURE — 74011000258 HC RX REV CODE- 258: Performed by: STUDENT IN AN ORGANIZED HEALTH CARE EDUCATION/TRAINING PROGRAM

## 2021-10-01 PROCEDURE — 36415 COLL VENOUS BLD VENIPUNCTURE: CPT

## 2021-10-01 PROCEDURE — 80053 COMPREHEN METABOLIC PANEL: CPT

## 2021-10-01 PROCEDURE — 74011250637 HC RX REV CODE- 250/637: Performed by: STUDENT IN AN ORGANIZED HEALTH CARE EDUCATION/TRAINING PROGRAM

## 2021-10-01 PROCEDURE — 65660000000 HC RM CCU STEPDOWN

## 2021-10-01 PROCEDURE — 74011250636 HC RX REV CODE- 250/636: Performed by: STUDENT IN AN ORGANIZED HEALTH CARE EDUCATION/TRAINING PROGRAM

## 2021-10-01 PROCEDURE — 74011250637 HC RX REV CODE- 250/637: Performed by: INTERNAL MEDICINE

## 2021-10-01 PROCEDURE — 87340 HEPATITIS B SURFACE AG IA: CPT

## 2021-10-01 PROCEDURE — 83615 LACTATE (LD) (LDH) ENZYME: CPT

## 2021-10-01 PROCEDURE — 85025 COMPLETE CBC W/AUTO DIFF WBC: CPT

## 2021-10-01 PROCEDURE — 97535 SELF CARE MNGMENT TRAINING: CPT

## 2021-10-01 PROCEDURE — 86706 HEP B SURFACE ANTIBODY: CPT

## 2021-10-01 PROCEDURE — 86803 HEPATITIS C AB TEST: CPT

## 2021-10-01 PROCEDURE — 86704 HEP B CORE ANTIBODY TOTAL: CPT

## 2021-10-01 RX ADMIN — BUSPIRONE HYDROCHLORIDE 10 MG: 5 TABLET ORAL at 09:28

## 2021-10-01 RX ADMIN — LISINOPRIL 10 MG: 10 TABLET ORAL at 09:27

## 2021-10-01 RX ADMIN — FLUOXETINE 60 MG: 20 CAPSULE ORAL at 09:28

## 2021-10-01 RX ADMIN — CEFTRIAXONE SODIUM 1 G: 1 INJECTION, POWDER, FOR SOLUTION INTRAMUSCULAR; INTRAVENOUS at 09:27

## 2021-10-01 RX ADMIN — LATANOPROST 1 DROP: 50 SOLUTION OPHTHALMIC at 17:36

## 2021-10-01 RX ADMIN — ALPRAZOLAM 1 MG: 1 TABLET ORAL at 18:16

## 2021-10-01 RX ADMIN — TRAZODONE HYDROCHLORIDE 100 MG: 100 TABLET ORAL at 22:41

## 2021-10-01 RX ADMIN — CASTOR OIL AND BALSAM, PERU: 788; 87 OINTMENT TOPICAL at 22:42

## 2021-10-01 RX ADMIN — ARIPIPRAZOLE 2 MG: 2 TABLET ORAL at 10:46

## 2021-10-01 RX ADMIN — SENNOSIDES AND DOCUSATE SODIUM 1 TABLET: 50; 8.6 TABLET ORAL at 09:28

## 2021-10-01 RX ADMIN — DOXYCYCLINE 100 MG: 100 INJECTION, POWDER, LYOPHILIZED, FOR SOLUTION INTRAVENOUS at 10:46

## 2021-10-01 RX ADMIN — DOXYCYCLINE 100 MG: 100 INJECTION, POWDER, LYOPHILIZED, FOR SOLUTION INTRAVENOUS at 22:47

## 2021-10-01 RX ADMIN — SENNOSIDES AND DOCUSATE SODIUM 1 TABLET: 50; 8.6 TABLET ORAL at 17:35

## 2021-10-01 RX ADMIN — CASTOR OIL AND BALSAM, PERU: 788; 87 OINTMENT TOPICAL at 09:29

## 2021-10-01 RX ADMIN — BRIMONIDINE TARTRATE 1 DROP: 2 SOLUTION OPHTHALMIC at 09:29

## 2021-10-01 RX ADMIN — Medication 10 ML: at 06:00

## 2021-10-01 RX ADMIN — ATORVASTATIN CALCIUM 30 MG: 20 TABLET, FILM COATED ORAL at 22:40

## 2021-10-01 RX ADMIN — CASTOR OIL AND BALSAM, PERU: 788; 87 OINTMENT TOPICAL at 17:35

## 2021-10-01 RX ADMIN — Medication 10 ML: at 22:41

## 2021-10-01 RX ADMIN — BUSPIRONE HYDROCHLORIDE 10 MG: 5 TABLET ORAL at 17:35

## 2021-10-01 NOTE — PROGRESS NOTES
[address] [facility] Adult  Hospitalist Group                                                                                          Hospitalist Progress Note  Aaron Sharp MD  Answering service: [phone] from in house phone        Date of Service:  10/1/2021  NAME:  Rohini Montes  :  1863  MRN:  [id number]      Admission Summary:   Rohini Montes is a 77 y.o. male w/ hx of mdd w/ SERGEI, htn, glaucoma, NIDDM II, Dyslipidemia who is referred to hospital from [address] [place] [address] for hypoxia. Incidental hypoxia noted during routine vitals. Pt states he feels well otherwise and had no acute complaints or concerns. Reported hypoxia to 60s prior to EMS arrival.  Concerned for \"lesions\" on his buttocks noted about a week ago, recurrent and intermittently painful. Saw his PCP abiut these lesion and was prescribed a cream. Denies melena or dyschezia. The patient denies any fever, cough, chills, chest or abdominal pain, nausea, vomiting, cough, congestion, recent illness, palpitations, or dysuria.     Remarkable vitals on ER Presentations: bp 158/98, o2 sats 98% on 8L Non-rebreather  Labs Remarkable for: wbc 15, k 3.3, glu 165, rapid covid neg  ER Images: Patchy densities in the left mid and lower thorax and right base could represent  atelectasis or airspace disease. ER Rx: rocephin 1g    Interval history / Subjective: Follow up shortness of breath, hypoxia. Patient seen and examined at the bedside. Labs, images and notes reviewed  Discussed with nursing staff, orders reviewed. Plan discussed with patient/Family  Feeling ok. Some intermittent breathing discomfort. On 5LPM O2 NC. No chest pain. No fever, chills. No other concerns. Assessment & Plan:     #Shortness of breath, infection VS atelectasis VS pulmonary edema. Somewhat better but still intermittent SOB  #Acute hypoxic and hypercarbic respiratory failure.  Repeat ABG 10/1/21 afternoon with SOB with better CO2 but still at 60-high  #Left upper lobe infiltrate with bilateral hilar and mediastinal lymphadenopathylikely reactive VS other lymphomatous condition, pneumonia/pneumonitis VS pulmonary edema  #Concern for atypical lymphocytes with lymphocytosis and LAD. R/o lymphoma. Oncology w/up noted 10/1/21. Outpatient f/up with Oncology  #HTN  #HLD  #Hypokalemia  #DM2, controlled  #MDD with SERGEI  #Glaucoma  #Obesity, weight 211 LB, pending height assessment and BMI assessment    -Admitted to inpatient, telemetry monitoring in IMCU  -Sick patient  -BiPAP as needed. Will use it tonight for whole night. Repeat ABG in AM  -Repeat CXR, ABG, procalcitonin, proBNP  -Echocardiogram 10/1/21 noted  -Currently on IV antibioticsRocephin and doxycycline. On admission, procalcitonin <0.05. If repeat PCT unchanged, consider discontinuing antibiotics and monitor off it  -Monitor I/Os. -If not improvement on BiPAP, would consider diuresis challenge x 1 with close BMP monitoring  -Consider pulmonology consultation  -Oncology on board for concern for lymphoma. Outpatient f/up recommended along with work up requested 9/30  -Continue CBC, BMP/CMP monitoring  -Continue other home medications as already ordered. -D/W pharmacy team    Guarded prognosis. Code status: DNR  DVT prophylaxis: Heparin SQ    Care Plan discussed with: Patient/Family, Nurse and   Anticipated Disposition: Home w/Family and TBD as per PT OT  Anticipated Discharge: Greater than 48 hours     Hospital Problems  Never Reviewed        Codes Class Noted POA    Respiratory failure with hypoxia [facility]) ICD-10-CM: J96.91  ICD-9-CM: 518.81  9/29/2021 Unknown                Review of Systems:   A comprehensive review of systems was negative except for that written in the HPI. Vital Signs:    Last 24hrs VS reviewed since prior progress note.  Most recent are:  Visit Vitals  BP (!) 157/92 (BP Patient Position: At rest)   Pulse 74   Temp 98.2 °F (36.8 °C)   Resp 21   Wt 91.1 kg (200 lb 13.4 oz)   SpO2 95%         Intake/Output Summary (Last 24 hours) at 10/1/2021 1830  Last data filed at 10/1/2021 1722  Gross per 24 hour   Intake 10 ml   Output 825 ml   Net -815 ml        Physical Examination:     I had a face to face encounter with this patient and independently examined them on 10/1/2021 as outlined below:          Constitutional:  Mild acute distress, cooperative, pleasant. Feels related to anxiety   ENT:  Oral mucosa moist, oropharynx benign. Resp:  CTA bilaterally. No wheezing/rhonchi/rales. Mild accessory muscle use   CV:  Regular rhythm, normal rate, no murmurs, gallops, rubs    GI:  Soft, non distended, non tender. normoactive bowel sounds, no hepatosplenomegaly     Musculoskeletal:  No edema, warm, 2+ pulses throughout    Neurologic:  Moves all extremities. AAOx3, CN II-XII reviewed            Data Review:    Review and/or order of clinical lab test  Review and/or order of tests in the radiology section of CPT  Review and/or order of tests in the medicine section of CPT    CTA CHEST W OR W WO CONT    Result Date: 9/29/2021  1. Focal airspace infiltrate anterior left upper lobe suspicious for possible focus of pneumonia with nonspecific broad areas of groundglass opacity which may reflect pulmonary edema, differential aeration, pneumonitis, or infectious process. 2. Hilar and mediastinal adenopathy which may be reactive and for which nonemergent follow-up is recommended. 3. Additional incidentals as above including coronary artery disease. XR CHEST PORT    Result Date: 9/30/2021  1. No interval change     XR CHEST PORT    Result Date: 9/29/2021  Depth of inspiration is shallow. There are linear densities and patchy densities in the left mid and lower thorax and right base could represent atelectasis or airspace disease.       Labs:     Recent Labs     10/01/21  1111 09/30/21  0356   WBC 10.1 12.4*   HGB 14.2 14.3   HCT 47.4 48.7   PLT 99* 100*     Recent Labs 10/01/21  1111 09/30/21  0356 09/29/21  1755    143 143   K 3.6 3.5 3.3*    104 102   CO2 34* 36* 39*   BUN 9 13 14   CREA 0.65* 0.80 1.08   * 160* 165*   CA 8.3* 7.9* 8.5     Recent Labs     10/01/21  1111 09/29/21  1755   ALT 25 34   * 173*   TBILI 0.6 0.6   TP 5.7* 6.3*   ALB 3.0* 3.3*   GLOB 2.7 3.0     No results for input(s): INR, PTP, APTT, INREXT, INREXT in the last 72 hours. No results for input(s): FE, TIBC, PSAT, FERR in the last 72 hours. No results found for: FOL, RBCF   No results for input(s): PH, PCO2, PO2 in the last 72 hours.   Recent Labs     09/29/21  1755   TROIQ <0.05     No results found for: CHOL, CHOLX, CHLST, CHOLV, HDL, HDLP, LDL, LDLC, DLDLP, TGLX, TRIGL, TRIGP, CHHD, CHHDX  No results found for: [facility]  Lab Results   Component Value Date/Time    Color YELLOW/STRAW 09/29/2021 06:20 PM    Appearance CLEAR 09/29/2021 06:20 PM    Specific gravity 1.021 09/29/2021 06:20 PM    pH (UA) 6.5 09/29/2021 06:20 PM    Protein TRACE (A) 09/29/2021 06:20 PM    Glucose Negative 09/29/2021 06:20 PM    Ketone Negative 09/29/2021 06:20 PM    Bilirubin Negative 09/29/2021 06:20 PM    Urobilinogen 1.0 09/29/2021 06:20 PM    Nitrites Negative 09/29/2021 06:20 PM    Leukocyte Esterase MODERATE (A) 09/29/2021 06:20 PM    Epithelial cells FEW 09/29/2021 06:20 PM    Bacteria Negative 09/29/2021 06:20 PM    WBC 0-4 09/29/2021 06:20 PM    RBC 0-5 09/29/2021 06:20 PM         Medications Reviewed:     Current Facility-Administered Medications   Medication Dose Route Frequency    cefTRIAXone (ROCEPHIN) 1 g in sterile water (preservative free) 10 mL IV syringe  1 g IntraVENous Q24H    atorvastatin (LIPITOR) tablet 30 mg  30 mg Oral QHS    balsam peru-castor oiL (VENELEX) ointment   Topical TID    doxycycline (VIBRAMYCIN) 100 mg in 0.9% sodium chloride (MBP/ADV) 100 mL MBP  100 mg IntraVENous Q12H    busPIRone (BUSPAR) tablet 10 mg  10 mg Oral BID    FLUoxetine (PROzac) capsule 60 mg 60 mg Oral DAILY    lisinopriL (PRINIVIL, ZESTRIL) tablet 10 mg  10 mg Oral DAILY    senna-docusate (PERICOLACE) 8.6-50 mg per tablet 1 Tablet  1 Tablet Oral BID    traZODone (DESYREL) tablet 100 mg  100 mg Oral QHS    brimonidine (ALPHAGAN) 0.2 % ophthalmic solution 1 Drop  1 Drop Both Eyes DAILY    latanoprost (XALATAN) 0.005 % ophthalmic solution 1 Drop  1 Drop Both Eyes QPM    ALPRAZolam (XANAX) tablet 1 mg  1 mg Oral BID PRN    ARIPiprazole (ABILIFY) tablet 2 mg  2 mg Oral DAILY    sodium chloride (NS) flush 5-40 mL  5-40 mL IntraVENous Q8H    sodium chloride (NS) flush 5-40 mL  5-40 mL IntraVENous PRN    acetaminophen (TYLENOL) tablet 650 mg  650 mg Oral Q6H PRN    Or    acetaminophen (TYLENOL) suppository 650 mg  650 mg Rectal Q6H PRN    polyethylene glycol (MIRALAX) packet 17 g  17 g Oral DAILY PRN    ondansetron (ZOFRAN ODT) tablet 4 mg  4 mg Oral Q8H PRN    Or    ondansetron (ZOFRAN) injection 4 mg  4 mg IntraVENous Q6H PRN    heparin (porcine) injection 5,000 Units  5,000 Units SubCUTAneous Q8H     ______________________________________________________________________  EXPECTED LENGTH OF STAY: 3d 14h  ACTUAL LENGTH OF STAY:          2                 Wander Fall MD

## 2021-10-01 NOTE — PROGRESS NOTES
Brief Hematology Note:    LD normal, Hep B AB/AG negative, hep C ab negative. Hep B Core ab pending. Flow cytometry still to be drawn. Not likely to need any systemic therapy soon and can continue work up OP.     Fran Proctor NP

## 2021-10-01 NOTE — PROGRESS NOTES
Problem: Mobility Impaired (Adult and Pediatric)  Goal: *Acute Goals and Plan of Care (Insert Text)  Description: FUNCTIONAL STATUS PRIOR TO ADMISSION: Patient was modified independent using a rolling walker for functional mobility. HOME SUPPORT PRIOR TO ADMISSION: The patient lived [facility]. Physical Therapy Goals  Initiated 9/30/2021  1. Patient will move from supine to sit and sit to supine in bed with independence within 7 day(s). 2.  Patient will transfer from bed to chair and chair to bed with modified independence using the least restrictive device within 7 day(s). 3.  Patient will perform sit to stand with minimal assistance/contact guard assist within 7 day(s). 4.  Patient will ambulate with minimal assistance/contact guard assist for 120 feet with the least restrictive device within 7 day(s). 10/1/2021 1345 by Nicky Clarke PT  Outcome: Progressing Towards Goal   PHYSICAL THERAPY TREATMENT  Patient: Norberto Valladares (89 y.o. male)  Date: 10/1/2021  Diagnosis: Respiratory failure with hypoxia ([facility] [address] [J96.91] <principal problem not specified>       Precautions:    Chart, physical therapy assessment, plan of care and goals were reviewed. ASSESSMENT  Patient continues with skilled PT services and is progressing towards goals. He demonstrates improved standing balance and tolerance today. He demonstrates the ability to perform sit to stand with decreased assistance. Patient is assisted with use of urinal in standing. He demonstrates wide LATA for improved balance. Patient stand pivot transfers with inappropriate use of RW discarding it part of the way through the transfer and requiring Min A x2 to ensure safety. Despite VC patient pushes up from [address] [place] [address] and does not keep with him during transfers. He tolerates standing at [facility] with RW for total assistance for hygiene and applications of Venelex by RN.  Patient is left in bedside chair with all needs met and waffle cushion underneath him and chair alarm intact. Patient education is also provided on use of incentive spirometer. Current Level of Function Impacting Discharge (mobility/balance): Min A 1-2     Other factors to consider for discharge: medical stability, decreased balance, decreased strength endurance, continued need for supplemental O2         PLAN :  Patient continues to benefit from skilled intervention to address the above impairments. Continue treatment per established plan of care. to address goals. Recommendation for discharge: (in order for the patient to meet his/her long term goals)  Therapy up to 5 days/week in SNF setting    This discharge recommendation:  Has not yet been discussed the attending provider and/or case management    IF patient discharges home will need the following DME: patient owns DME required for discharge       SUBJECTIVE:   Patient stated I just feel confused.     OBJECTIVE DATA SUMMARY:   Critical Behavior:  Neurologic State: Alert  Orientation Level: Oriented X4  Cognition: Follows commands  Safety/Judgement: Awareness of environment, Fall prevention  Functional Mobility Training:  Bed Mobility:     Supine to Sit: Minimum assistance;Bed Modified; Additional time              Transfers:  Sit to Stand: Contact guard assistance  Stand to Sit: Contact guard assistance        Bed to Chair: Contact guard assistance; Additional time; Adaptive equipment                    Balance:  Sitting: With support; Impaired  Sitting - Static: Good (unsupported)  Sitting - Dynamic: Fair (occasional)  Standing: Impaired  Standing - Static: Fair  Standing - Dynamic : Fair  Ambulation/Gait Training:                                                        Stairs:               Therapeutic Exercises:     Pain Rating:      Activity Tolerance:   Fair    After treatment patient left in no apparent distress:   Sitting in chair, Call bell within reach, and Bed / chair alarm activated    COMMUNICATION/COLLABORATION:   The patients plan of care was discussed with: Registered nurse.      Lola Barrios, PT   Time Calculation: 51 mins

## 2021-10-01 NOTE — PROGRESS NOTES
Problem: Falls - Risk of  Goal: *Absence of Falls  Description: Document Annette Tucker Fall Risk and appropriate interventions in the flowsheet.   Outcome: Progressing Towards Goal  Note: Fall Risk Interventions:  Mobility Interventions: Assess mobility with egress test, Communicate number of staff needed for ambulation/transfer, Patient to call before getting OOB         Medication Interventions: Patient to call before getting OOB, Teach patient to arise slowly    Elimination Interventions: Bed/chair exit alarm, Call light in reach, Patient to call for help with toileting needs, Toileting schedule/hourly rounds    History of Falls Interventions: Bed/chair exit alarm, Door open when patient unattended, Evaluate medications/consider consulting pharmacy

## 2021-10-01 NOTE — PROGRESS NOTES
TRANSFER - OUT REPORT:    Verbal report given to [facility] on [address]  being transferred to [address] [place] [address] for routine progression of care       Report consisted of patients Situation, Background, Assessment and   Recommendations(SBAR). Information from the following report(s) SBAR, Kardex, ED Summary, Intake/Output, MAR, Recent Results and Cardiac Rhythm NSR was reviewed with the receiving nurse. Lines:   Peripheral IV 09/30/21 Right Arm (Active)   Site Assessment Clean, dry, & intact 09/30/21 2144   Phlebitis Assessment 0 09/30/21 2144   Infiltration Assessment 0 09/30/21 2144   Dressing Status Clean, dry, & intact 09/30/21 2144   Dressing Type Transparent;Tape 09/30/21 2144   Hub Color/Line Status Pink; Infusing 09/30/21 2144   Action Taken Open ports on tubing capped 09/30/21 2144   Alcohol Cap Used Yes 09/30/21 2144        Opportunity for questions and clarification was provided.       Patient transported with:   Monitor  O2 @ 4 liters  Registered Nurse

## 2021-10-01 NOTE — PROGRESS NOTES
Problem: Self Care Deficits Care Plan (Adult)  Goal: *Acute Goals and Plan of Care (Insert Text)  Description:   FUNCTIONAL STATUS PRIOR TO ADMISSION: Patient resides at [facility]. Reports being independent in BADLs, to include showering. Patient is mod I for mobility. HOME SUPPORT: Patient from retirement. Occupational Therapy Goals  Initiated 9/30/2021  1. Patient will perform tolerate standing with RW support and CGA in prep for standing ADLs within 7 day(s). 2.  Patient will perform seated / standing bathing with minimal assistance/contact guard assist within 7 day(s). 3.  Patient will perform upper body dressing and lower body dressing with minimal assistance/contact guard assist using RW prn within 7 day(s). 4.  Patient will perform toilet transfers with CGA using RW prn within 7 day(s). 5.  Patient will perform all aspects of toileting with CGA using RW prn within 7 day(s). 6.  Patient will participate in upper extremity therapeutic exercise/activities with supervision/set-up for 7 minutes within 7 day(s). 7.  Patient will utilize energy conservation and fall prevention techniques during functional activities with verbal cues within 7 day(s). Outcome: Progressing Towards Goal   OCCUPATIONAL THERAPY TREATMENT  Patient: Elver Arce (89 y.o. male)  Date: 10/1/2021  Diagnosis: Respiratory failure with hypoxia ([facility] [address] [J96.91] <principal problem not specified>       Precautions:    Chart, occupational therapy assessment, plan of care, and goals were reviewed. ASSESSMENT  Patient continues with skilled OT services and is progressing towards goals. Patient received resting in bed on 4.5L O2 with SpO2 90-91% at rest. Patient agreeable to session although endorses feeling overall unwell and somewhat confused.  Up to max assist needed for effective use of urinal as patient unable to position appropriately despite maintaining stand with overall min assist. Patient demonstrating improved functional mobility this session, transferring from EOB to [facility] with overall min assist x2. Note patient with poor RW management during transfer despite stating that he regularly uses \"one of these. \" Post-toileting, patient tolerating increased time standing with BUE support via RW, sustaining position with CGA for several minutes to allow for dependent bowel hygiene, RN application of ointment to wounds, and for donning protective brief. Patient agreeable to sitting up in chair for light grooming with SpO2 improving to 93-94% in seated position. Throughout session, patient benefits from simple, one-step commands, multimodal cues for attention, and frequent encouragement. Continue to recommend SNF at baseline as patient performance remains below mod I baseline with physical and cognitive deficits impeding patient independence ands safety. Current Level of Function Impacting Discharge (ADLs): min A x2; up to total A for self-care    Other factors to consider for discharge: new O2 use         PLAN :  Patient continues to benefit from skilled intervention to address the above impairments. Continue treatment per established plan of care to address goals. Recommend with staff: OOB to chair and toileting at [facility] with staff assist x2 and gait belt    Recommendation for discharge: (in order for the patient to meet his/her long term goals)  Therapy up to 5 days/week in SNF setting    This discharge recommendation:  Has been made in collaboration with the attending provider and/or case management    IF patient discharges home will need the following DME: TBD       SUBJECTIVE:   Patient stated I'm all confused today.     OBJECTIVE DATA SUMMARY:   Cognitive/Behavioral Status:  Neurologic State: Alert;Confused  Orientation Level: Oriented X4  Cognition: Follows commands  Perception: Appears intact  Perseveration: No perseveration noted  Safety/Judgement: Awareness of environment; Fall prevention    Functional Mobility and Transfers for ADLs:  Bed Mobility:  Supine to Sit: Minimum assistance;Bed Modified; Additional time    Transfers:  Sit to Stand: Contact guard assistance  Functional Transfers  Toilet Transfer : Minimum assistance; Additional time; Adaptive equipment (to [facility])  Bed to Chair: Contact guard assistance; Additional time; Adaptive equipment    Balance:  Sitting: With support; Impaired  Sitting - Static: Good (unsupported)  Sitting - Dynamic: Fair (occasional)  Standing: Impaired  Standing - Static: Fair  Standing - Dynamic : Fair    ADL Intervention:   Grooming  Grooming Assistance: Set-up; Stand-by assistance (seated in chair)    Toileting  Toileting Assistance: Maximum assistance (min A for balance)  Bladder Hygiene: Maximum assistance (to hold urinal in place in standing)  Clothing Management: Total assistance (dependent) (for urination in standing)    Cognitive Retraining  Safety/Judgement: Awareness of environment; Fall prevention    Pain:  Patient reporting pain in buttocks - RN in to observe wounds and apply ointment. Activity Tolerance:   Fair and requires rest breaks    After treatment patient left in no apparent distress:   Sitting in chair, Call bell within reach and Bed / chair alarm activated    COMMUNICATION/COLLABORATION:   The patients plan of care was discussed with: Physical therapist and Registered nurse.      Dang Redd OT  Time Calculation: 50 mins

## 2021-10-02 LAB
ALBUMIN SERPL-MCNC: 2.6 G/DL (ref 3.5–5)
ALBUMIN/GLOB SERPL: 0.7 {RATIO} (ref 1.1–2.2)
ALP SERPL-CCNC: 146 U/L (ref 45–117)
ALT SERPL-CCNC: 33 U/L (ref 12–78)
ANION GAP SERPL CALC-SCNC: ABNORMAL MMOL/L (ref 5–15)
AST SERPL-CCNC: ABNORMAL U/L (ref 15–37)
BILIRUB SERPL-MCNC: 0.7 MG/DL (ref 0.2–1)
BUN SERPL-MCNC: 9 MG/DL (ref 6–20)
BUN/CREAT SERPL: 13 (ref 12–20)
CALCIUM SERPL-MCNC: 8.5 MG/DL (ref 8.5–10.1)
CHLORIDE SERPL-SCNC: 102 MMOL/L (ref 97–108)
CO2 SERPL-SCNC: 31 MMOL/L (ref 21–32)
CREAT SERPL-MCNC: 0.69 MG/DL (ref 0.7–1.3)
GLOBULIN SER CALC-MCNC: 3.9 G/DL (ref 2–4)
GLUCOSE SERPL-MCNC: 98 MG/DL (ref 65–100)
HBV CORE AB SERPL QL IA: NEGATIVE
POTASSIUM SERPL-SCNC: ABNORMAL MMOL/L (ref 3.5–5.1)
PROT SERPL-MCNC: 6.5 G/DL (ref 6.4–8.2)
SODIUM SERPL-SCNC: 132 MMOL/L (ref 136–145)

## 2021-10-02 PROCEDURE — 94660 CPAP INITIATION&MGMT: CPT

## 2021-10-02 PROCEDURE — 74011250637 HC RX REV CODE- 250/637: Performed by: INTERNAL MEDICINE

## 2021-10-02 PROCEDURE — 80053 COMPREHEN METABOLIC PANEL: CPT

## 2021-10-02 PROCEDURE — 74011250636 HC RX REV CODE- 250/636: Performed by: INTERNAL MEDICINE

## 2021-10-02 PROCEDURE — 65660000000 HC RM CCU STEPDOWN

## 2021-10-02 PROCEDURE — 74011250636 HC RX REV CODE- 250/636: Performed by: STUDENT IN AN ORGANIZED HEALTH CARE EDUCATION/TRAINING PROGRAM

## 2021-10-02 PROCEDURE — 36415 COLL VENOUS BLD VENIPUNCTURE: CPT

## 2021-10-02 PROCEDURE — 74011000250 HC RX REV CODE- 250: Performed by: INTERNAL MEDICINE

## 2021-10-02 PROCEDURE — 74011250637 HC RX REV CODE- 250/637: Performed by: STUDENT IN AN ORGANIZED HEALTH CARE EDUCATION/TRAINING PROGRAM

## 2021-10-02 PROCEDURE — 74011000258 HC RX REV CODE- 258: Performed by: STUDENT IN AN ORGANIZED HEALTH CARE EDUCATION/TRAINING PROGRAM

## 2021-10-02 RX ORDER — BUMETANIDE 0.25 MG/ML
1 INJECTION INTRAMUSCULAR; INTRAVENOUS ONCE
Status: COMPLETED | OUTPATIENT
Start: 2021-10-02 | End: 2021-10-02

## 2021-10-02 RX ADMIN — Medication 10 ML: at 18:00

## 2021-10-02 RX ADMIN — ALPRAZOLAM 1 MG: 1 TABLET ORAL at 14:38

## 2021-10-02 RX ADMIN — BUSPIRONE HYDROCHLORIDE 10 MG: 5 TABLET ORAL at 08:20

## 2021-10-02 RX ADMIN — CASTOR OIL AND BALSAM, PERU: 788; 87 OINTMENT TOPICAL at 18:00

## 2021-10-02 RX ADMIN — LISINOPRIL 10 MG: 10 TABLET ORAL at 08:20

## 2021-10-02 RX ADMIN — SENNOSIDES AND DOCUSATE SODIUM 1 TABLET: 50; 8.6 TABLET ORAL at 18:01

## 2021-10-02 RX ADMIN — DOXYCYCLINE 100 MG: 100 INJECTION, POWDER, LYOPHILIZED, FOR SOLUTION INTRAVENOUS at 22:50

## 2021-10-02 RX ADMIN — ATORVASTATIN CALCIUM 30 MG: 20 TABLET, FILM COATED ORAL at 22:39

## 2021-10-02 RX ADMIN — CASTOR OIL AND BALSAM, PERU: 788; 87 OINTMENT TOPICAL at 22:39

## 2021-10-02 RX ADMIN — HEPARIN SODIUM 5000 UNITS: 5000 INJECTION INTRAVENOUS; SUBCUTANEOUS at 08:19

## 2021-10-02 RX ADMIN — BRIMONIDINE TARTRATE 1 DROP: 2 SOLUTION OPHTHALMIC at 08:20

## 2021-10-02 RX ADMIN — HEPARIN SODIUM 5000 UNITS: 5000 INJECTION INTRAVENOUS; SUBCUTANEOUS at 17:59

## 2021-10-02 RX ADMIN — BUSPIRONE HYDROCHLORIDE 10 MG: 5 TABLET ORAL at 18:01

## 2021-10-02 RX ADMIN — LATANOPROST 1 DROP: 50 SOLUTION OPHTHALMIC at 18:00

## 2021-10-02 RX ADMIN — FLUOXETINE 60 MG: 20 CAPSULE ORAL at 08:20

## 2021-10-02 RX ADMIN — TRAZODONE HYDROCHLORIDE 100 MG: 100 TABLET ORAL at 22:39

## 2021-10-02 RX ADMIN — Medication 10 ML: at 22:39

## 2021-10-02 RX ADMIN — CASTOR OIL AND BALSAM, PERU: 788; 87 OINTMENT TOPICAL at 08:20

## 2021-10-02 RX ADMIN — ARIPIPRAZOLE 2 MG: 2 TABLET ORAL at 09:00

## 2021-10-02 RX ADMIN — CEFTRIAXONE SODIUM 1 G: 1 INJECTION, POWDER, FOR SOLUTION INTRAMUSCULAR; INTRAVENOUS at 08:19

## 2021-10-02 RX ADMIN — DOXYCYCLINE 100 MG: 100 INJECTION, POWDER, LYOPHILIZED, FOR SOLUTION INTRAVENOUS at 10:12

## 2021-10-02 RX ADMIN — BUMETANIDE 1 MG: 0.25 INJECTION INTRAMUSCULAR; INTRAVENOUS at 17:59

## 2021-10-02 RX ADMIN — SENNOSIDES AND DOCUSATE SODIUM 1 TABLET: 50; 8.6 TABLET ORAL at 08:20

## 2021-10-02 NOTE — PROGRESS NOTES
[facility] Adult  Hospitalist Group                                                                                          Hospitalist Progress Note  Luzmaria Fatima MD  Answering service: [phone] from in house phone        Date of Service:  10/2/2021  NAME:  Vamsi Apodaca  :  [birth year]  MRN:  [id number]      Admission Summary:   Vamsi Apodaca is a 77 y.o. male w/ hx of mdd w/ SERGEI, htn, glaucoma, NIDDM II, Dyslipidemia who is referred to hospital from [address] [place] [address] for hypoxia. Incidental hypoxia noted during routine vitals. Pt states he feels well otherwise and had no acute complaints or concerns. Reported hypoxia to 60s prior to EMS arrival.  Concerned for \"lesions\" on his buttocks noted about a week ago, recurrent and intermittently painful. Saw his PCP abiut these lesion and was prescribed a cream. Denies melena or dyschezia. The patient denies any fever, cough, chills, chest or abdominal pain, nausea, vomiting, cough, congestion, recent illness, palpitations, or dysuria.     Remarkable vitals on ER Presentations: bp 158/98, o2 sats 98% on 8L Non-rebreather  Labs Remarkable for: wbc 15, k 3.3, glu 165, rapid covid neg  ER Images: Patchy densities in the left mid and lower thorax and right base could represent  atelectasis or airspace disease. ER Rx: rocephin 1g    Interval history / Subjective: Follow up shortness of breath, hypoxia. Patient seen and examined at the bedside. Labs, images and notes reviewed  Discussed with nursing staff, orders reviewed. Plan discussed with patient/Family  Feeling okay. Breathing comfortable. Did not tolerate BiPAP last night. Suggested that he can tolerate only with humidify air. No other concerns or overnight events. Still on 4.5 LPM O2 NC. Assessment & Plan:     #Shortness of breath, infection VS atelectasis VS pulmonary edema. Somewhat better but still intermittent SOB  #Acute hypoxic and hypercarbic respiratory failure. Repeat ABG 10/1/21 afternoon with SOB with better CO2 but still at 60-high  #Left upper lobe infiltrate with bilateral hilar and mediastinal lymphadenopathylikely reactive VS other lymphomatous condition, pneumonia/pneumonitis VS pulmonary edema  #Concern for atypical lymphocytes with lymphocytosis and LAD. R/o lymphoma. Oncology w/up noted 10/1/21. Outpatient f/up with Oncology  #HTN  #HLD  #Hypokalemia  #DM2, controlled  #MDD with SERGEI  #Glaucoma  #Obesity, weight 211 LB, pending height assessment and BMI assessment    -Admitted to inpatient, telemetry monitoring in IMCU  -BiPAP as needed. Did not tolerate 10/1/2021 night. -Repeat CXR, ABG, procalcitonin, proBNP  -Echocardiogram 10/1/21 noted  -Trial of IV Bumex 1 mg x 1. Monitor BMP closely. Renal function stable at present.  -Continue to wean O2 as tolerated. -Currently on IV antibioticsRocephin and doxycycline. On admission, procalcitonin <0.05. If repeat PCT unchanged, consider discontinuing antibiotics and monitor off it  -Monitor I/Os. -Consider pulmonology consultation  -Oncology on board for concern for lymphoma. Outpatient f/up recommended along with work up requested 9/30  -Continue CBC, BMP/CMP monitoring  -Continue other home medications as already ordered. -D/W pharmacy team    Guarded prognosis. Code status: DNR  DVT prophylaxis: Heparin SQ    Care Plan discussed with: Patient/Family, Nurse and   Anticipated Disposition: Home w/Family and TBD as per PT OT  Anticipated Discharge: Greater than 48 hours     Hospital Problems  Never Reviewed        Codes Class Noted POA    Respiratory failure with hypoxia [facility]) ICD-10-CM: J96.91  ICD-9-CM: 518.81  9/29/2021 Unknown                Review of Systems:   A comprehensive review of systems was negative except for that written in the HPI. Vital Signs:    Last 24hrs VS reviewed since prior progress note.  Most recent are:  Visit Vitals  BP (!) 144/73   Pulse 70   Temp 97.9 °F (36.6 °C)   Resp 20   Wt 89.4 kg (197 lb 3.2 oz)   SpO2 95%         Intake/Output Summary (Last 24 hours) at 10/2/2021 1523  Last data filed at 10/1/2021 2324  Gross per 24 hour   Intake 510 ml   Output 450 ml   Net 60 ml        Physical Examination:     I had a face to face encounter with this patient and independently examined them on 10/2/2021 as outlined below:          Constitutional:  Mild acute distress, cooperative, pleasant. Not anxious appearing at present. On 4.4 LPM O2 NC   ENT:  Oral mucosa moist, oropharynx benign. Resp:  CTA bilaterally. No wheezing/rhonchi/rales. No accessory muscle use   CV:  Regular rhythm, normal rate, no murmurs, gallops, rubs    GI:  Soft, non distended, non tender. normoactive bowel sounds, no hepatosplenomegaly     Musculoskeletal:  No edema, warm, 2+ pulses throughout    Neurologic:  Moves all extremities. AAOx3, CN II-XII reviewed            Data Review:    Review and/or order of clinical lab test  Review and/or order of tests in the radiology section of CPT  Review and/or order of tests in the medicine section of CPT    CTA CHEST W OR W WO CONT    Result Date: 9/29/2021  1. Focal airspace infiltrate anterior left upper lobe suspicious for possible focus of pneumonia with nonspecific broad areas of groundglass opacity which may reflect pulmonary edema, differential aeration, pneumonitis, or infectious process. 2. Hilar and mediastinal adenopathy which may be reactive and for which nonemergent follow-up is recommended. 3. Additional incidentals as above including coronary artery disease. XR CHEST PORT    Result Date: 9/30/2021  1. No interval change     XR CHEST PORT    Result Date: 9/29/2021  Depth of inspiration is shallow. There are linear densities and patchy densities in the left mid and lower thorax and right base could represent atelectasis or airspace disease.       Labs:     Recent Labs     10/01/21  1111 09/30/21  0356   WBC 10.1 12.4*   HGB 14.2 14.3   HCT 47.4 48.7   PLT 99* 100*     Recent Labs     10/02/21  0400 10/01/21  1111 09/30/21  0356   * 140 143   K HEMOLYZED,RECOLLECT REQUESTED 3.6 3.5    102 104   CO2 31 34* 36*   BUN 9 9 13   CREA 0.69* 0.65* 0.80   GLU 98 137* 160*   CA 8.5 8.3* 7.9*     Recent Labs     10/02/21  0400 10/01/21  1111 09/29/21  1755   ALT 33 25 34   * 126* 173*   TBILI 0.7 0.6 0.6   TP 6.5 5.7* 6.3*   ALB 2.6* 3.0* 3.3*   GLOB 3.9 2.7 3.0     No results for input(s): INR, PTP, APTT, INREXT, INREXT in the last 72 hours. No results for input(s): FE, TIBC, PSAT, FERR in the last 72 hours. No results found for: FOL, RBCF   No results for input(s): PH, PCO2, PO2 in the last 72 hours.   Recent Labs     09/29/21  1755   TROIQ <0.05     No results found for: CHOL, CHOLX, CHLST, CHOLV, HDL, HDLP, LDL, LDLC, DLDLP, TGLX, TRIGL, TRIGP, CHHD, CHHDX  No results found for: Milagros Rosen  Lab Results   Component Value Date/Time    Color YELLOW/STRAW 09/29/2021 06:20 PM    Appearance CLEAR 09/29/2021 06:20 PM    Specific gravity 1.021 09/29/2021 06:20 PM    pH (UA) 6.5 09/29/2021 06:20 PM    Protein TRACE (A) 09/29/2021 06:20 PM    Glucose Negative 09/29/2021 06:20 PM    Ketone Negative 09/29/2021 06:20 PM    Bilirubin Negative 09/29/2021 06:20 PM    Urobilinogen 1.0 09/29/2021 06:20 PM    Nitrites Negative 09/29/2021 06:20 PM    Leukocyte Esterase MODERATE (A) 09/29/2021 06:20 PM    Epithelial cells FEW 09/29/2021 06:20 PM    Bacteria Negative 09/29/2021 06:20 PM    WBC 0-4 09/29/2021 06:20 PM    RBC 0-5 09/29/2021 06:20 PM         Medications Reviewed:     Current Facility-Administered Medications   Medication Dose Route Frequency    influenza vaccine 2021-22 (6 mos+)(PF) (FLUARIX/FLULAVAL/FLUZONE QUAD) injection 0.5 mL  1 Each IntraMUSCular PRIOR TO DISCHARGE    cefTRIAXone (ROCEPHIN) 1 g in sterile water (preservative free) 10 mL IV syringe  1 g IntraVENous Q24H    atorvastatin (LIPITOR) tablet 30 mg  30 mg Oral QHS    balsam peru-castor oiL (VENELEX) ointment   Topical TID    doxycycline (VIBRAMYCIN) 100 mg in 0.9% sodium chloride (MBP/ADV) 100 mL MBP  100 mg IntraVENous Q12H    busPIRone (BUSPAR) tablet 10 mg  10 mg Oral BID    FLUoxetine (PROzac) capsule 60 mg  60 mg Oral DAILY    lisinopriL (PRINIVIL, ZESTRIL) tablet 10 mg  10 mg Oral DAILY    senna-docusate (PERICOLACE) 8.6-50 mg per tablet 1 Tablet  1 Tablet Oral BID    traZODone (DESYREL) tablet 100 mg  100 mg Oral QHS    brimonidine (ALPHAGAN) 0.2 % ophthalmic solution 1 Drop  1 Drop Both Eyes DAILY    latanoprost (XALATAN) 0.005 % ophthalmic solution 1 Drop  1 Drop Both Eyes QPM    ALPRAZolam (XANAX) tablet 1 mg  1 mg Oral BID PRN    ARIPiprazole (ABILIFY) tablet 2 mg  2 mg Oral DAILY    sodium chloride (NS) flush 5-40 mL  5-40 mL IntraVENous Q8H    sodium chloride (NS) flush 5-40 mL  5-40 mL IntraVENous PRN    acetaminophen (TYLENOL) tablet 650 mg  650 mg Oral Q6H PRN    Or    acetaminophen (TYLENOL) suppository 650 mg  650 mg Rectal Q6H PRN    polyethylene glycol (MIRALAX) packet 17 g  17 g Oral DAILY PRN    ondansetron (ZOFRAN ODT) tablet 4 mg  4 mg Oral Q8H PRN    Or    ondansetron (ZOFRAN) injection 4 mg  4 mg IntraVENous Q6H PRN    heparin (porcine) injection 5,000 Units  5,000 Units SubCUTAneous Q8H     ______________________________________________________________________  EXPECTED LENGTH OF STAY: 3d 14h  ACTUAL LENGTH OF STAY:          3                 Romana Neptune, MD

## 2021-10-02 NOTE — PROGRESS NOTES
Problem: Falls - Risk of  Goal: *Absence of Falls  Description: Document Abdi Murray Fall Risk and appropriate interventions in the flowsheet.   Outcome: Progressing Towards Goal  Note: Fall Risk Interventions:  Mobility Interventions: Communicate number of staff needed for ambulation/transfer         Medication Interventions: Evaluate medications/consider consulting pharmacy, Patient to call before getting OOB, Teach patient to arise slowly    Elimination Interventions: Call light in reach, Patient to call for help with toileting needs, Toileting schedule/hourly rounds    History of Falls Interventions: Evaluate medications/consider consulting pharmacy         Problem: Patient Education: Go to Patient Education Activity  Goal: Patient/Family Education  Outcome: Progressing Towards Goal     Problem: General Medical Care Plan  Goal: *Vital signs within specified parameters  Outcome: Progressing Towards Goal

## 2021-10-02 NOTE — PROGRESS NOTES
Problem: Falls - Risk of  Goal: *Absence of Falls  Description: Document Clydene Bone Fall Risk and appropriate interventions in the flowsheet.   Outcome: Progressing Towards Goal  Note: Fall Risk Interventions:  Mobility Interventions: Assess mobility with egress test, Bed/chair exit alarm, Communicate number of staff needed for ambulation/transfer, Patient to call before getting OOB, PT Consult for mobility concerns         Medication Interventions: Bed/chair exit alarm, Evaluate medications/consider consulting pharmacy, Patient to call before getting OOB, Teach patient to arise slowly    Elimination Interventions: Bed/chair exit alarm, Call light in reach, Patient to call for help with toileting needs, Toileting schedule/hourly rounds, Urinal in reach    History of Falls Interventions: Bed/chair exit alarm, Door open when patient unattended         Problem: Patient Education: Go to Patient Education Activity  Goal: Patient/Family Education  Outcome: Progressing Towards Goal     Problem: Patient Education: Go to Patient Education Activity  Goal: Patient/Family Education  Outcome: Progressing Towards Goal     Problem: Patient Education: Go to Patient Education Activity  Goal: Patient/Family Education  Outcome: Progressing Towards Goal     Problem: General Medical Care Plan  Goal: *Vital signs within specified parameters  Outcome: Progressing Towards Goal  Goal: *Labs within defined limits  Outcome: Progressing Towards Goal  Goal: *Absence of infection signs and symptoms  Outcome: Progressing Towards Goal  Goal: *Optimal pain control at patient's stated goal  Outcome: Progressing Towards Goal  Goal: *Skin integrity maintained  Outcome: Progressing Towards Goal  Goal: *Fluid volume balance  Outcome: Progressing Towards Goal  Goal: *Optimize nutritional status  Outcome: Progressing Towards Goal  Goal: *Anxiety reduced or absent  Outcome: Progressing Towards Goal  Goal: *Progressive mobility and function (eg: ADL's)  Outcome: Progressing Towards Goal     Problem: Patient Education: Go to Patient Education Activity  Goal: Patient/Family Education  Outcome: Progressing Towards Goal

## 2021-10-03 ENCOUNTER — APPOINTMENT (OUTPATIENT)
Dept: GENERAL RADIOLOGY | Age: 76
DRG: 840 | End: 2021-10-03
Attending: INTERNAL MEDICINE
Payer: MEDICARE

## 2021-10-03 LAB
ALBUMIN SERPL-MCNC: 3.4 G/DL (ref 3.5–5)
ALBUMIN/GLOB SERPL: 1.1 {RATIO} (ref 1.1–2.2)
ALP SERPL-CCNC: 165 U/L (ref 45–117)
ALT SERPL-CCNC: 30 U/L (ref 12–78)
ANION GAP SERPL CALC-SCNC: 1 MMOL/L (ref 5–15)
AST SERPL-CCNC: 26 U/L (ref 15–37)
BASOPHILS # BLD: 0.1 K/UL (ref 0–0.1)
BASOPHILS NFR BLD: 1 % (ref 0–1)
BILIRUB SERPL-MCNC: 0.8 MG/DL (ref 0.2–1)
BUN SERPL-MCNC: 14 MG/DL (ref 6–20)
BUN/CREAT SERPL: 19 (ref 12–20)
CALCIUM SERPL-MCNC: 8.6 MG/DL (ref 8.5–10.1)
CHLORIDE SERPL-SCNC: 97 MMOL/L (ref 97–108)
CO2 SERPL-SCNC: 42 MMOL/L (ref 21–32)
CREAT SERPL-MCNC: 0.73 MG/DL (ref 0.7–1.3)
DIFFERENTIAL METHOD BLD: ABNORMAL
EOSINOPHIL # BLD: 0.3 K/UL (ref 0–0.4)
EOSINOPHIL NFR BLD: 2 % (ref 0–7)
ERYTHROCYTE [DISTWIDTH] IN BLOOD BY AUTOMATED COUNT: 13.2 % (ref 11.5–14.5)
GLOBULIN SER CALC-MCNC: 3 G/DL (ref 2–4)
GLUCOSE SERPL-MCNC: 126 MG/DL (ref 65–100)
HCT VFR BLD AUTO: 49.1 % (ref 36.6–50.3)
HGB BLD-MCNC: 15.5 G/DL (ref 12.1–17)
IMM GRANULOCYTES # BLD AUTO: 0.1 K/UL (ref 0–0.04)
IMM GRANULOCYTES NFR BLD AUTO: 1 % (ref 0–0.5)
LYMPHOCYTES # BLD: 7.7 K/UL (ref 0.8–3.5)
LYMPHOCYTES NFR BLD: 60 % (ref 12–49)
MCH RBC QN AUTO: 29.3 PG (ref 26–34)
MCHC RBC AUTO-ENTMCNC: 31.6 G/DL (ref 30–36.5)
MCV RBC AUTO: 92.8 FL (ref 80–99)
MONOCYTES # BLD: 0.6 K/UL (ref 0–1)
MONOCYTES NFR BLD: 5 % (ref 5–13)
NEUTS SEG # BLD: 3.9 K/UL (ref 1.8–8)
NEUTS SEG NFR BLD: 31 % (ref 32–75)
NRBC # BLD: 0 K/UL (ref 0–0.01)
NRBC BLD-RTO: 0 PER 100 WBC
PLATELET # BLD AUTO: 110 K/UL (ref 150–400)
PMV BLD AUTO: 10 FL (ref 8.9–12.9)
POTASSIUM SERPL-SCNC: 2.9 MMOL/L (ref 3.5–5.1)
PROT SERPL-MCNC: 6.4 G/DL (ref 6.4–8.2)
RBC # BLD AUTO: 5.29 M/UL (ref 4.1–5.7)
RBC MORPH BLD: ABNORMAL
SODIUM SERPL-SCNC: 140 MMOL/L (ref 136–145)
WBC # BLD AUTO: 12.7 K/UL (ref 4.1–11.1)

## 2021-10-03 PROCEDURE — 65660000000 HC RM CCU STEPDOWN

## 2021-10-03 PROCEDURE — 80053 COMPREHEN METABOLIC PANEL: CPT

## 2021-10-03 PROCEDURE — 85025 COMPLETE CBC W/AUTO DIFF WBC: CPT

## 2021-10-03 PROCEDURE — 74011000258 HC RX REV CODE- 258: Performed by: STUDENT IN AN ORGANIZED HEALTH CARE EDUCATION/TRAINING PROGRAM

## 2021-10-03 PROCEDURE — 74011250637 HC RX REV CODE- 250/637: Performed by: STUDENT IN AN ORGANIZED HEALTH CARE EDUCATION/TRAINING PROGRAM

## 2021-10-03 PROCEDURE — 74011000258 HC RX REV CODE- 258: Performed by: INTERNAL MEDICINE

## 2021-10-03 PROCEDURE — 77010033678 HC OXYGEN DAILY

## 2021-10-03 PROCEDURE — 74011250636 HC RX REV CODE- 250/636: Performed by: INTERNAL MEDICINE

## 2021-10-03 PROCEDURE — 71045 X-RAY EXAM CHEST 1 VIEW: CPT

## 2021-10-03 PROCEDURE — 74011000250 HC RX REV CODE- 250: Performed by: INTERNAL MEDICINE

## 2021-10-03 PROCEDURE — 74011250637 HC RX REV CODE- 250/637: Performed by: INTERNAL MEDICINE

## 2021-10-03 PROCEDURE — 74011250636 HC RX REV CODE- 250/636: Performed by: STUDENT IN AN ORGANIZED HEALTH CARE EDUCATION/TRAINING PROGRAM

## 2021-10-03 PROCEDURE — 36415 COLL VENOUS BLD VENIPUNCTURE: CPT

## 2021-10-03 RX ORDER — BUMETANIDE 0.25 MG/ML
0.5 INJECTION INTRAMUSCULAR; INTRAVENOUS ONCE
Status: COMPLETED | OUTPATIENT
Start: 2021-10-03 | End: 2021-10-03

## 2021-10-03 RX ORDER — POTASSIUM CHLORIDE 750 MG/1
40 TABLET, FILM COATED, EXTENDED RELEASE ORAL EVERY 4 HOURS
Status: COMPLETED | OUTPATIENT
Start: 2021-10-03 | End: 2021-10-03

## 2021-10-03 RX ADMIN — BUMETANIDE 0.5 MG: 0.25 INJECTION INTRAMUSCULAR; INTRAVENOUS at 17:15

## 2021-10-03 RX ADMIN — SENNOSIDES AND DOCUSATE SODIUM 1 TABLET: 50; 8.6 TABLET ORAL at 08:12

## 2021-10-03 RX ADMIN — CEFTRIAXONE SODIUM 1 G: 1 INJECTION, POWDER, FOR SOLUTION INTRAMUSCULAR; INTRAVENOUS at 08:11

## 2021-10-03 RX ADMIN — DOXYCYCLINE 100 MG: 100 INJECTION, POWDER, LYOPHILIZED, FOR SOLUTION INTRAVENOUS at 10:07

## 2021-10-03 RX ADMIN — ARIPIPRAZOLE 2 MG: 2 TABLET ORAL at 08:23

## 2021-10-03 RX ADMIN — ATORVASTATIN CALCIUM 30 MG: 20 TABLET, FILM COATED ORAL at 22:13

## 2021-10-03 RX ADMIN — FLUOXETINE 60 MG: 20 CAPSULE ORAL at 08:12

## 2021-10-03 RX ADMIN — Medication 10 ML: at 22:23

## 2021-10-03 RX ADMIN — ALPRAZOLAM 1 MG: 1 TABLET ORAL at 12:22

## 2021-10-03 RX ADMIN — POTASSIUM CHLORIDE 40 MEQ: 750 TABLET, FILM COATED, EXTENDED RELEASE ORAL at 12:21

## 2021-10-03 RX ADMIN — BRIMONIDINE TARTRATE 1 DROP: 2 SOLUTION OPHTHALMIC at 08:13

## 2021-10-03 RX ADMIN — CASTOR OIL AND BALSAM, PERU: 788; 87 OINTMENT TOPICAL at 22:22

## 2021-10-03 RX ADMIN — SENNOSIDES AND DOCUSATE SODIUM 1 TABLET: 50; 8.6 TABLET ORAL at 17:15

## 2021-10-03 RX ADMIN — BUSPIRONE HYDROCHLORIDE 10 MG: 5 TABLET ORAL at 17:15

## 2021-10-03 RX ADMIN — CASTOR OIL AND BALSAM, PERU: 788; 87 OINTMENT TOPICAL at 08:12

## 2021-10-03 RX ADMIN — LATANOPROST 1 DROP: 50 SOLUTION OPHTHALMIC at 17:25

## 2021-10-03 RX ADMIN — DOXYCYCLINE 100 MG: 100 INJECTION, POWDER, LYOPHILIZED, FOR SOLUTION INTRAVENOUS at 22:14

## 2021-10-03 RX ADMIN — CASTOR OIL AND BALSAM, PERU: 788; 87 OINTMENT TOPICAL at 17:14

## 2021-10-03 RX ADMIN — TRAZODONE HYDROCHLORIDE 100 MG: 100 TABLET ORAL at 22:13

## 2021-10-03 RX ADMIN — HEPARIN SODIUM 5000 UNITS: 5000 INJECTION INTRAVENOUS; SUBCUTANEOUS at 08:12

## 2021-10-03 RX ADMIN — POTASSIUM CHLORIDE 40 MEQ: 750 TABLET, FILM COATED, EXTENDED RELEASE ORAL at 10:07

## 2021-10-03 RX ADMIN — LISINOPRIL 10 MG: 10 TABLET ORAL at 08:12

## 2021-10-03 RX ADMIN — BUSPIRONE HYDROCHLORIDE 10 MG: 5 TABLET ORAL at 08:12

## 2021-10-03 NOTE — PROGRESS NOTES
[facility] Adult  Hospitalist Group                                                                                          Hospitalist Progress Note  Bisi Malave MD  Answering service: [phone] from in house phone        Date of Service:  10/3/2021  NAME:  Elver Arce  :    MRN:  [id number]      Admission Summary:   Elver Arce is a 77 y.o. male w/ hx of mdd w/ SERGEI, htn, glaucoma, NIDDM II, Dyslipidemia who is referred to hospital from [address] [place] [address] for hypoxia. Incidental hypoxia noted during routine vitals. Pt states he feels well otherwise and had no acute complaints or concerns. Reported hypoxia to 60s prior to EMS arrival.  Concerned for \"lesions\" on his buttocks noted about a week ago, recurrent and intermittently painful. Saw his PCP abiut these lesion and was prescribed a cream. Denies melena or dyschezia. The patient denies any fever, cough, chills, chest or abdominal pain, nausea, vomiting, cough, congestion, recent illness, palpitations, or dysuria.     Remarkable vitals on ER Presentations: bp 158/98, o2 sats 98% on 8L Non-rebreather  Labs Remarkable for: wbc 15, k 3.3, glu 165, rapid covid neg  ER Images: Patchy densities in the left mid and lower thorax and right base could represent  atelectasis or airspace disease. ER Rx: rocephin 1g    Interval history / Subjective: Follow up shortness of breath, hypoxia. Patient seen and examined at the bedside. Labs, images and notes reviewed  Discussed with nursing staff, orders reviewed. Plan discussed with patient/Family  Feeling little better. Currently on 4 LPM O2 NC. Assess drop to 90-91 from upper 90s when reduced O2 NC to 3 LPM.  No other concerns. Tolerated diuresis with IV Bumex x1 on 10/2/2021 well. Overnight events noted. Assessment & Plan:     #Shortness of breath, infection VS atelectasis VS pulmonary edema.  Somewhat better but still intermittent SOB  #Acute hypoxic and hypercarbic respiratory failure. Repeat ABG 10/1/21 afternoon with SOB with better CO2 but still at 60-high. Continues to remain on 4 LPM O2 NC  #Left upper lobe infiltrate with bilateral hilar and mediastinal lymphadenopathylikely reactive VS other lymphomatous condition, pneumonia/pneumonitis VS pulmonary edema  #Concern for atypical lymphocytes with lymphocytosis and LAD. R/o lymphoma. Oncology w/up noted 10/1/21. Outpatient f/up with Oncology  #Hypokalemia  #HTN  #HLD  #DM2, controlled  #MDD with SERGEI  #Glaucoma  #Obesity, weight 211 LB, pending height assessment and BMI assessment    -Admitted to inpatient, telemetry monitoring in IMCU  -BiPAP as needed. Did not tolerate 10/1/2021 night. -Repeat CXR, ABG, procalcitonin, proBNP from 9/30/2021 noted. -CXR today 10/3/2021  -Bumex IV 0.5 mg x 1  -Trial of IV Bumex 1 mg x 1 well-tolerated on 10/2/2021.  -Monitor BMP closely  -Echocardiogram 10/1/21 noted  -Continue to wean O2 as tolerated. -Currently on IV antibioticsRocephin and doxycycline for total 7 days. On admission, procalcitonin <0.05. If repeat PCT unchanged, consider discontinuing antibiotics and monitor off it  -Monitor I/Os. -Consider pulmonology consultation if unable to wean O2.  -Oncology on board for concern for lymphoma. Outpatient f/up recommended along with work up requested 9/30  -Continue CBC, BMP/CMP monitoring  -Continue other home medications as already ordered. -D/W pharmacy team    Guarded prognosis.      Code status: DNR  DVT prophylaxis: Heparin SQ    Care Plan discussed with: Patient/Family, Nurse and   Anticipated Disposition: Home w/Family and TBD as per PT OT  Anticipated Discharge: Greater than 48 hours     Hospital Problems  Never Reviewed        Codes Class Noted POA    Respiratory failure with hypoxia [facility]) ICD-10-CM: J96.91  ICD-9-CM: 518.81  9/29/2021 Unknown                Review of Systems:   A comprehensive review of systems was negative except for that written in the HPI. Vital Signs:    Last 24hrs VS reviewed since prior progress note. Most recent are:  Visit Vitals  /70   Pulse 65   Temp 97.8 °F (36.6 °C)   Resp 16   Wt 87.3 kg (192 lb 6.4 oz)   SpO2 95%         Intake/Output Summary (Last 24 hours) at 10/3/2021 1645  Last data filed at 10/3/2021 0043  Gross per 24 hour   Intake 450 ml   Output 1000 ml   Net -550 ml        Physical Examination:     I had a face to face encounter with this patient and independently examined them on 10/3/2021 as outlined below:          Constitutional:  No distress at rest.  Cooperative, pleasant. Alert, awake, comfortable on 4 LPM O2 NC   ENT:  Oral mucosa moist, oropharynx benign. Resp:  CTA bilaterally. No wheezing/rhonchi/rales. No accessory muscle use   CV:  Regular rhythm, normal rate, no murmurs, gallops, rubs    GI:  Soft, non distended, non tender. normoactive bowel sounds, no hepatosplenomegaly     Musculoskeletal:  No edema, warm, 2+ pulses throughout    Neurologic:  Moves all extremities. AAOx3, CN II-XII reviewed            Data Review:    Review and/or order of clinical lab test  Review and/or order of tests in the radiology section of CPT  Review and/or order of tests in the medicine section of CPT    CTA CHEST W OR W WO CONT    Result Date: 9/29/2021  1. Focal airspace infiltrate anterior left upper lobe suspicious for possible focus of pneumonia with nonspecific broad areas of groundglass opacity which may reflect pulmonary edema, differential aeration, pneumonitis, or infectious process. 2. Hilar and mediastinal adenopathy which may be reactive and for which nonemergent follow-up is recommended. 3. Additional incidentals as above including coronary artery disease. XR CHEST PORT    Result Date: 10/3/2021  Interval improvement in the previously seen areas of airspace disease. XR CHEST PORT    Result Date: 9/30/2021  1.  No interval change     XR CHEST PORT    Result Date: 9/29/2021  Depth of inspiration is shallow. There are linear densities and patchy densities in the left mid and lower thorax and right base could represent atelectasis or airspace disease. Labs:     Recent Labs     10/03/21  0104 10/01/21  1111   WBC 12.7* 10.1   HGB 15.5 14.2   HCT 49.1 47.4   * 99*     Recent Labs     10/03/21  0104 10/02/21  0400 10/01/21  1111    132* 140   K 2.9* HEMOLYZED,RECOLLECT REQUESTED 3.6   CL 97 102 102   CO2 42* 31 34*   BUN 14 9 9   CREA 0.73 0.69* 0.65*   * 98 137*   CA 8.6 8.5 8.3*     Recent Labs     10/03/21  0104 10/02/21  0400 10/01/21  1111   ALT 30 33 25   * 146* 126*   TBILI 0.8 0.7 0.6   TP 6.4 6.5 5.7*   ALB 3.4* 2.6* 3.0*   GLOB 3.0 3.9 2.7     No results for input(s): INR, PTP, APTT, INREXT, INREXT in the last 72 hours. No results for input(s): FE, TIBC, PSAT, FERR in the last 72 hours. No results found for: FOL, RBCF   No results for input(s): PH, PCO2, PO2 in the last 72 hours. No results for input(s): CPK, CKNDX, TROIQ in the last 72 hours.     No lab exists for component: CPKMB  No results found for: CHOL, CHOLX, CHLST, CHOLV, HDL, HDLP, LDL, LDLC, DLDLP, TGLX, TRIGL, TRIGP, CHHD, CHHDX  No results found for: Watkins Patches  Lab Results   Component Value Date/Time    Color YELLOW/STRAW 09/29/2021 06:20 PM    Appearance CLEAR 09/29/2021 06:20 PM    Specific gravity 1.021 09/29/2021 06:20 PM    pH (UA) 6.5 09/29/2021 06:20 PM    Protein TRACE (A) 09/29/2021 06:20 PM    Glucose Negative 09/29/2021 06:20 PM    Ketone Negative 09/29/2021 06:20 PM    Bilirubin Negative 09/29/2021 06:20 PM    Urobilinogen 1.0 09/29/2021 06:20 PM    Nitrites Negative 09/29/2021 06:20 PM    Leukocyte Esterase MODERATE (A) 09/29/2021 06:20 PM    Epithelial cells FEW 09/29/2021 06:20 PM    Bacteria Negative 09/29/2021 06:20 PM    WBC 0-4 09/29/2021 06:20 PM    RBC 0-5 09/29/2021 06:20 PM         Medications Reviewed:     Current Facility-Administered Medications   Medication Dose Route Frequency    bumetanide (BUMEX) injection 0.5 mg  0.5 mg IntraVENous ONCE    doxycycline (VIBRAMYCIN) 100 mg in 0.9% sodium chloride (MBP/ADV) 100 mL MBP  100 mg IntraVENous Q12H    influenza vaccine 2021-22 (6 mos+)(PF) (FLUARIX/FLULAVAL/FLUZONE QUAD) injection 0.5 mL  1 Each IntraMUSCular PRIOR TO DISCHARGE    cefTRIAXone (ROCEPHIN) 1 g in sterile water (preservative free) 10 mL IV syringe  1 g IntraVENous Q24H    atorvastatin (LIPITOR) tablet 30 mg  30 mg Oral QHS    balsam peru-castor oiL (VENELEX) ointment   Topical TID    busPIRone (BUSPAR) tablet 10 mg  10 mg Oral BID    FLUoxetine (PROzac) capsule 60 mg  60 mg Oral DAILY    lisinopriL (PRINIVIL, ZESTRIL) tablet 10 mg  10 mg Oral DAILY    senna-docusate (PERICOLACE) 8.6-50 mg per tablet 1 Tablet  1 Tablet Oral BID    traZODone (DESYREL) tablet 100 mg  100 mg Oral QHS    brimonidine (ALPHAGAN) 0.2 % ophthalmic solution 1 Drop  1 Drop Both Eyes DAILY    latanoprost (XALATAN) 0.005 % ophthalmic solution 1 Drop  1 Drop Both Eyes QPM    ALPRAZolam (XANAX) tablet 1 mg  1 mg Oral BID PRN    ARIPiprazole (ABILIFY) tablet 2 mg  2 mg Oral DAILY    sodium chloride (NS) flush 5-40 mL  5-40 mL IntraVENous Q8H    sodium chloride (NS) flush 5-40 mL  5-40 mL IntraVENous PRN    acetaminophen (TYLENOL) tablet 650 mg  650 mg Oral Q6H PRN    Or    acetaminophen (TYLENOL) suppository 650 mg  650 mg Rectal Q6H PRN    polyethylene glycol (MIRALAX) packet 17 g  17 g Oral DAILY PRN    ondansetron (ZOFRAN ODT) tablet 4 mg  4 mg Oral Q8H PRN    Or    ondansetron (ZOFRAN) injection 4 mg  4 mg IntraVENous Q6H PRN    heparin (porcine) injection 5,000 Units  5,000 Units SubCUTAneous Q8H     ______________________________________________________________________  EXPECTED LENGTH OF STAY: 3d 14h  ACTUAL LENGTH OF STAY:          4                 Luiz Card MD

## 2021-10-03 NOTE — CALL BACK NOTE
CO2 is 42 this morning. Patient was admitted with hypercarbic (pco2 85.8 at admission) hypoxic respiratory failure. Patient took off his bipap after 30 minutes last night. He is supposed to be using biPAP at night.

## 2021-10-03 NOTE — PROGRESS NOTES
Problem: Falls - Risk of  Goal: *Absence of Falls  Description: Document Heidi Nguyen Fall Risk and appropriate interventions in the flowsheet.   Outcome: Progressing Towards Goal  Note: Fall Risk Interventions:  Mobility Interventions: Assess mobility with egress test, Communicate number of staff needed for ambulation/transfer, Bed/chair exit alarm, Patient to call before getting OOB         Medication Interventions: Bed/chair exit alarm, Evaluate medications/consider consulting pharmacy, Patient to call before getting OOB, Teach patient to arise slowly    Elimination Interventions: Bed/chair exit alarm, Call light in reach, Patient to call for help with toileting needs, Toileting schedule/hourly rounds    History of Falls Interventions: Bed/chair exit alarm, Evaluate medications/consider consulting pharmacy

## 2021-10-04 LAB
ANION GAP SERPL CALC-SCNC: 1 MMOL/L (ref 5–15)
BACTERIA SPEC CULT: NORMAL
BUN SERPL-MCNC: 16 MG/DL (ref 6–20)
BUN/CREAT SERPL: 22 (ref 12–20)
CALCIUM SERPL-MCNC: 8.7 MG/DL (ref 8.5–10.1)
CHLORIDE SERPL-SCNC: 97 MMOL/L (ref 97–108)
CO2 SERPL-SCNC: 38 MMOL/L (ref 21–32)
CREAT SERPL-MCNC: 0.74 MG/DL (ref 0.7–1.3)
ERYTHROCYTE [DISTWIDTH] IN BLOOD BY AUTOMATED COUNT: 13.2 % (ref 11.5–14.5)
GLUCOSE SERPL-MCNC: 146 MG/DL (ref 65–100)
HCT VFR BLD AUTO: 50.6 % (ref 36.6–50.3)
HGB BLD-MCNC: 15.8 G/DL (ref 12.1–17)
MAGNESIUM SERPL-MCNC: 1.6 MG/DL (ref 1.6–2.4)
MCH RBC QN AUTO: 29.2 PG (ref 26–34)
MCHC RBC AUTO-ENTMCNC: 31.2 G/DL (ref 30–36.5)
MCV RBC AUTO: 93.5 FL (ref 80–99)
NRBC # BLD: 0 K/UL (ref 0–0.01)
NRBC BLD-RTO: 0 PER 100 WBC
PHOSPHATE SERPL-MCNC: 4 MG/DL (ref 2.6–4.7)
PLATELET # BLD AUTO: 112 K/UL (ref 150–400)
PMV BLD AUTO: 9.8 FL (ref 8.9–12.9)
POTASSIUM SERPL-SCNC: 3.6 MMOL/L (ref 3.5–5.1)
RBC # BLD AUTO: 5.41 M/UL (ref 4.1–5.7)
SERVICE CMNT-IMP: NORMAL
SODIUM SERPL-SCNC: 136 MMOL/L (ref 136–145)
WBC # BLD AUTO: 14.1 K/UL (ref 4.1–11.1)

## 2021-10-04 PROCEDURE — 80048 BASIC METABOLIC PNL TOTAL CA: CPT

## 2021-10-04 PROCEDURE — 97116 GAIT TRAINING THERAPY: CPT

## 2021-10-04 PROCEDURE — 83735 ASSAY OF MAGNESIUM: CPT

## 2021-10-04 PROCEDURE — 77010033678 HC OXYGEN DAILY

## 2021-10-04 PROCEDURE — 97530 THERAPEUTIC ACTIVITIES: CPT

## 2021-10-04 PROCEDURE — 85027 COMPLETE CBC AUTOMATED: CPT

## 2021-10-04 PROCEDURE — 74011000258 HC RX REV CODE- 258: Performed by: INTERNAL MEDICINE

## 2021-10-04 PROCEDURE — 74011250636 HC RX REV CODE- 250/636: Performed by: INTERNAL MEDICINE

## 2021-10-04 PROCEDURE — 74011250637 HC RX REV CODE- 250/637: Performed by: STUDENT IN AN ORGANIZED HEALTH CARE EDUCATION/TRAINING PROGRAM

## 2021-10-04 PROCEDURE — 65660000000 HC RM CCU STEPDOWN

## 2021-10-04 PROCEDURE — 74011250637 HC RX REV CODE- 250/637: Performed by: INTERNAL MEDICINE

## 2021-10-04 PROCEDURE — 36415 COLL VENOUS BLD VENIPUNCTURE: CPT

## 2021-10-04 PROCEDURE — 84100 ASSAY OF PHOSPHORUS: CPT

## 2021-10-04 PROCEDURE — 74011250636 HC RX REV CODE- 250/636: Performed by: STUDENT IN AN ORGANIZED HEALTH CARE EDUCATION/TRAINING PROGRAM

## 2021-10-04 PROCEDURE — 74011000250 HC RX REV CODE- 250: Performed by: INTERNAL MEDICINE

## 2021-10-04 RX ORDER — BUMETANIDE 0.25 MG/ML
0.5 INJECTION INTRAMUSCULAR; INTRAVENOUS ONCE
Status: COMPLETED | OUTPATIENT
Start: 2021-10-04 | End: 2021-10-04

## 2021-10-04 RX ORDER — BUMETANIDE 1 MG/1
0.5 TABLET ORAL ONCE
Status: DISCONTINUED | OUTPATIENT
Start: 2021-10-04 | End: 2021-10-04

## 2021-10-04 RX ORDER — LIDOCAINE 4 G/100G
1 PATCH TOPICAL EVERY 24 HOURS
Status: DISCONTINUED | OUTPATIENT
Start: 2021-10-04 | End: 2021-10-10 | Stop reason: HOSPADM

## 2021-10-04 RX ORDER — DICLOFENAC SODIUM 10 MG/G
2 GEL TOPICAL
Status: DISCONTINUED | OUTPATIENT
Start: 2021-10-04 | End: 2021-10-10 | Stop reason: HOSPADM

## 2021-10-04 RX ADMIN — Medication 10 ML: at 21:48

## 2021-10-04 RX ADMIN — CASTOR OIL AND BALSAM, PERU: 788; 87 OINTMENT TOPICAL at 18:20

## 2021-10-04 RX ADMIN — LATANOPROST 1 DROP: 50 SOLUTION OPHTHALMIC at 18:22

## 2021-10-04 RX ADMIN — BRIMONIDINE TARTRATE 1 DROP: 2 SOLUTION OPHTHALMIC at 08:58

## 2021-10-04 RX ADMIN — BUSPIRONE HYDROCHLORIDE 10 MG: 5 TABLET ORAL at 08:57

## 2021-10-04 RX ADMIN — ARIPIPRAZOLE 2 MG: 2 TABLET ORAL at 09:06

## 2021-10-04 RX ADMIN — BUMETANIDE 0.5 MG: 0.25 INJECTION INTRAMUSCULAR; INTRAVENOUS at 18:20

## 2021-10-04 RX ADMIN — SENNOSIDES AND DOCUSATE SODIUM 1 TABLET: 50; 8.6 TABLET ORAL at 18:20

## 2021-10-04 RX ADMIN — Medication 10 ML: at 13:58

## 2021-10-04 RX ADMIN — HEPARIN SODIUM 5000 UNITS: 5000 INJECTION INTRAVENOUS; SUBCUTANEOUS at 00:58

## 2021-10-04 RX ADMIN — LISINOPRIL 10 MG: 10 TABLET ORAL at 08:57

## 2021-10-04 RX ADMIN — CASTOR OIL AND BALSAM, PERU: 788; 87 OINTMENT TOPICAL at 21:48

## 2021-10-04 RX ADMIN — DOXYCYCLINE 100 MG: 100 INJECTION, POWDER, LYOPHILIZED, FOR SOLUTION INTRAVENOUS at 21:48

## 2021-10-04 RX ADMIN — DOXYCYCLINE 100 MG: 100 INJECTION, POWDER, LYOPHILIZED, FOR SOLUTION INTRAVENOUS at 09:27

## 2021-10-04 RX ADMIN — CEFTRIAXONE SODIUM 1 G: 1 INJECTION, POWDER, FOR SOLUTION INTRAMUSCULAR; INTRAVENOUS at 08:57

## 2021-10-04 RX ADMIN — CASTOR OIL AND BALSAM, PERU: 788; 87 OINTMENT TOPICAL at 08:57

## 2021-10-04 RX ADMIN — SENNOSIDES AND DOCUSATE SODIUM 1 TABLET: 50; 8.6 TABLET ORAL at 08:57

## 2021-10-04 RX ADMIN — ATORVASTATIN CALCIUM 30 MG: 20 TABLET, FILM COATED ORAL at 21:48

## 2021-10-04 RX ADMIN — TRAZODONE HYDROCHLORIDE 100 MG: 100 TABLET ORAL at 21:48

## 2021-10-04 RX ADMIN — BUSPIRONE HYDROCHLORIDE 10 MG: 5 TABLET ORAL at 18:20

## 2021-10-04 RX ADMIN — FLUOXETINE 60 MG: 20 CAPSULE ORAL at 08:57

## 2021-10-04 NOTE — PROGRESS NOTES
Problem: Falls - Risk of  Goal: *Absence of Falls  Description: Document Lindy Delacruz Fall Risk and appropriate interventions in the flowsheet.   Outcome: Progressing Towards Goal  Note: Fall Risk Interventions:  Mobility Interventions: Assess mobility with egress test, Bed/chair exit alarm, Communicate number of staff needed for ambulation/transfer, Patient to call before getting OOB         Medication Interventions: Bed/chair exit alarm, Evaluate medications/consider consulting pharmacy, Patient to call before getting OOB, Teach patient to arise slowly    Elimination Interventions: Bed/chair exit alarm, Call light in reach, Patient to call for help with toileting needs, Toileting schedule/hourly rounds    History of Falls Interventions: Bed/chair exit alarm, Consult care management for discharge planning, Evaluate medications/consider consulting pharmacy

## 2021-10-04 NOTE — PROGRESS NOTES
Brief Hematology Note:    Flow cytometry that was ordered last week not obtained, ?order was cancelled. Repeat flow ordered - spoke with Pathology, flow done on 9/29/21 peripheral smear. Dr. Doreen Ford to review.     Stephen Gomez NP

## 2021-10-04 NOTE — PROGRESS NOTES
[address] [facility] visit. Mr. Robin Fields was sitting up in his chair. Assisted him with his lunch tray so he could reach it. Prayer and communion offered. He talked about being a [employer] for about  30 years before meeting Fr. Latisha Joseph who taught him about the [address] [place] [address]. Advised him that Fr. Omaira Vidal would be here on Tuesday if he would like the Sacrament of the Sick.     Parris Lion, SBS, RN, ACSW, LCSW   Page:  [phone]

## 2021-10-04 NOTE — PROGRESS NOTES
Problem: Mobility Impaired (Adult and Pediatric)  Goal: *Acute Goals and Plan of Care (Insert Text)  Description: FUNCTIONAL STATUS PRIOR TO ADMISSION: Patient was modified independent using a rolling walker for functional mobility. HOME SUPPORT PRIOR TO ADMISSION: The patient lived [facility]. Physical Therapy Goals  Initiated 9/30/2021  1. Patient will move from supine to sit and sit to supine in bed with independence within 7 day(s). 2.  Patient will transfer from bed to chair and chair to bed with modified independence using the least restrictive device within 7 day(s). 3.  Patient will perform sit to stand with minimal assistance/contact guard assist within 7 day(s). 4.  Patient will ambulate with minimal assistance/contact guard assist for 120 feet with the least restrictive device within 7 day(s). Outcome: Progressing Towards Goal    PHYSICAL THERAPY TREATMENT  Patient: Cezar Dave (89 y.o. male)  Date: 10/4/2021  Diagnosis: Respiratory failure with hypoxia ([facility] [address] [J96.91] <principal problem not specified>       Precautions:    Chart, physical therapy assessment, plan of care and goals were reviewed. ASSESSMENT  Patient continues with skilled PT services and is progressing towards goals. Pt received supine in chair and willing to work with therapy. Pt presents with decreased weakness, balance and activity tolerance, with continued confusion at times during session. Pt reported wanting to brush his teeth and shave. Pt able to tolerate STS to RW with CGA and line management, followed by gait training ~20' total to bathroom and back to recliner with CGA,  VC for RW placement and upright posture. While in the restroom pt was able to continue with static standing ~4 min at the sink while brushing his teeth with occasional UE support on sink or RW. Upon returning to recliner pt required a seated rest break due to fatigue, reporting he felt wore out.  Pt completed session seated in recliner with call bell within reach and all needs met at the time. Rn notified of session. Current Level of Function Impacting Discharge (mobility/balance): CGA for all mobility, amb up to 20' with RW. Other factors to consider for discharge: fall risk, confusion, safety awareness. PLAN :  Patient continues to benefit from skilled intervention to address the above impairments. Continue treatment per established plan of care. to address goals. Recommendation for discharge: (in order for the patient to meet his/her long term goals)  Therapy up to 5 days/week in SNF setting    This discharge recommendation:  Has not yet been discussed the attending provider and/or case management    IF patient discharges home will need the following DME: rolling walker       SUBJECTIVE:   Patient stated The nurse is gonna help me shave.     OBJECTIVE DATA SUMMARY:   Critical Behavior:  Neurologic State: Alert  Orientation Level: Oriented X4  Cognition: Appropriate decision making  Safety/Judgement: Awareness of environment, Fall prevention  Functional Mobility Training:  Transfers:  Sit to Stand: Contact guard assistance  Stand to Sit: Contact guard assistance     Balance:  Sitting: Intact  Standing: Impaired  Standing - Static: Constant support;Good  Standing - Dynamic : Constant support; Fair    Ambulation/Gait Training:  Distance (ft): 20 Feet (ft)  Assistive Device: Gait belt;Walker, rolling  Ambulation - Level of Assistance: Contact guard assistance; Additional time  Gait Abnormalities: Decreased step clearance  Base of Support: Widened  Speed/Chana: Pace decreased (<100 feet/min); Shuffled  Step Length: Right shortened;Left shortened    Pain Rating:  No pain reported    Activity Tolerance:   Good, Fair, and requires rest breaks    After treatment patient left in no apparent distress:   Sitting in chair and Call bell within reach    COMMUNICATION/COLLABORATION:   The patients plan of care was discussed with: Registered nurse.      Mirtha Mack PTA   Time Calculation: 28 mins

## 2021-10-04 NOTE — PROGRESS NOTES
Reason for Admission:  Hypoxia                     RUR Score:      17% Low               Plan for utilizing home health:    NA      PCP: First and Last name:  Cristina Del Rosario MD     Name of Practice: [facility]   Are you a current patient: Yes/No: Yes   Approximate date of last visit: NA   Can you participate in a virtual visit with your PCP:  HARDEEP                    Current Advanced Directive/Advance Care Plan: DNR      Healthcare Decision Maker:  POA/Keyonna Conde [phone]  Click here to [address] including selection of the Healthcare Decision Maker Relationship (ie \"Primary\")                             Transition of Care Plan:      CM met with patient to inform of CM role and to assess needs. Patient resides at [facility]. He uses a walker at baseline but reports no other DME use. Current recommendations are for SNF. Patient has no real preference and has not been in a rehab facility as a patient. CM to provide SNF list for choice and will submit referrals.     Lester Cantu MS

## 2021-10-04 NOTE — PROGRESS NOTES
[facility] Adult  Hospitalist Group                                                                                          Hospitalist Progress Note  Nisha Arreola MD  Answering service: [phone] OR [phone] from in house phone        Date of Service:  10/4/2021  NAME:  April Fontana  :  [id number]  MRN:  [id number]      Admission Summary:   April Fontana is a 77 y.o. male w/ hx of mdd w/ SERGEI, htn, glaucoma, NIDDM II, Dyslipidemia who is referred to hospital from [address] [place] [address] for hypoxia. Incidental hypoxia noted during routine vitals. Pt states he feels well otherwise and had no acute complaints or concerns. Reported hypoxia to 60s prior to EMS arrival.  Concerned for \"lesions\" on his buttocks noted about a week ago, recurrent and intermittently painful. Saw his PCP abiut these lesion and was prescribed a cream. Denies melena or dyschezia. The patient denies any fever, cough, chills, chest or abdominal pain, nausea, vomiting, cough, congestion, recent illness, palpitations, or dysuria.     Remarkable vitals on ER Presentations: bp 158/98, o2 sats 98% on 8L Non-rebreather  Labs Remarkable for: wbc 15, k 3.3, glu 165, rapid covid neg  ER Images: Patchy densities in the left mid and lower thorax and right base could represent  atelectasis or airspace disease. ER Rx: rocephin 1g    Interval history / Subjective: Follow up shortness of breath, hypoxia. Patient seen and examined at the bedside. Labs, images and notes reviewed  Discussed with nursing staff, orders reviewed. Plan discussed with patient/Family  Unchanged from previous day. Still on 4.5 LPM O2 NC in the morning. After therapy, with desaturation, increase O2 supplementation to 6 LPM NC. No fever or chills. No wheezing. No other concerns or overnight events. Assessment & Plan:     #Shortness of breath, infection VS atelectasis VS pulmonary edema. Somewhat better but still intermittent SOB.   Now, more aggravated with exertion. #Acute hypoxic and hypercarbic respiratory failure. On 46 LPM O2 NC  #Left upper lobe infiltrate with bilateral hilar and mediastinal lymphadenopathylikely reactive VS other lymphomatous condition, pneumonia/pneumonitis VS pulmonary edema  #Concern for atypical lymphocytes with lymphocytosis and LAD. R/o lymphoma. Oncology w/up noted 10/1/21. Outpatient f/up with Oncology. Flow cytometry again requested/ordered by oncology 10/4/2021  #Hypokalemia  #HTN  #HLD  #DM2, controlled  #MDD with SERGEI  #Glaucoma  #Obesity, weight 211 LB, pending height assessment and BMI assessment    -Admitted to inpatient, telemetry monitoring in IMCU  -BiPAP as needed. Did not tolerate 10/1/2021 night. Not using not able to tolerate  -Repeat CXR, ABG, procalcitonin, proBNP from 9/30/2021 noted. -CXR 10/3/2021 noted. -Bumex IV 0.5 mg x 1 10/2/2021. Well-tolerated as per previous day with 1 mg on 10/2/2021  -Continue Bumex 0.5 mg IV x1 on 10/4/2021  -Monitor BMP closely  -Echocardiogram 10/1/21 noted, EF 55 to 60%. -Continue to wean O2 as tolerated. -Currently on IV antibioticsRocephin and Doxycycline for total 7 days. On admission, procalcitonin <0.05.   -Monitor I/Os. -Consider pulmonology consultation if unable to wean O2.  -Oncology on board for concern for lymphoma. Outpatient f/up recommended along with work up requested 9/30  -Continue CBC, BMP/CMP monitoring  -Continue other home medications as already ordered. -D/W pharmacy team.    Guarded prognosis.      Code status: DNR  DVT prophylaxis: Heparin SQ    Care Plan discussed with: Patient/Family, Nurse and   Anticipated Disposition: Home w/Family and TBD as per PT OT  Anticipated Discharge: Greater than 48 hours     Hospital Problems  Never Reviewed        Codes Class Noted POA    Respiratory failure with hypoxia [facility]) ICD-10-CM: J96.91  ICD-9-CM: 518.81  9/29/2021 Unknown                Review of Systems:   A comprehensive review of systems was negative except for that written in the HPI. Vital Signs:    Last 24hrs VS reviewed since prior progress note. Most recent are:  Visit Vitals  /74 (BP 1 Location: Left upper arm, BP Patient Position: At rest)   Pulse 65   Temp 98.2 °F (36.8 °C)   Resp 16   Wt 86.5 kg (190 lb 11.2 oz)   SpO2 90%       No intake or output data in the 24 hours ending 10/04/21 1418     Physical Examination:     I had a face to face encounter with this patient and independently examined them on 10/4/2021 as outlined below:          Constitutional:  No distress at rest.  Cooperative, pleasant. Alert, awake, comfortable on 6 LPM O2 NC after exertion   ENT:  Oral mucosa moist, oropharynx benign. Resp:  CTA bilaterally. No wheezing/rhonchi/rales. No accessory muscle use   CV:  Regular rhythm, normal rate, no murmurs, gallops, rubs    GI:  Soft, non distended, non tender. normoactive bowel sounds, no hepatosplenomegaly     Musculoskeletal:  No edema, warm, 2+ pulses throughout    Neurologic:  Moves all extremities. AAOx3, CN II-XII reviewed            Data Review:    Review and/or order of clinical lab test  Review and/or order of tests in the radiology section of CPT  Review and/or order of tests in the medicine section of CPT    CTA CHEST W OR W WO CONT    Result Date: 9/29/2021  1. Focal airspace infiltrate anterior left upper lobe suspicious for possible focus of pneumonia with nonspecific broad areas of groundglass opacity which may reflect pulmonary edema, differential aeration, pneumonitis, or infectious process. 2. Hilar and mediastinal adenopathy which may be reactive and for which nonemergent follow-up is recommended. 3. Additional incidentals as above including coronary artery disease. XR CHEST PORT    Result Date: 10/3/2021  Interval improvement in the previously seen areas of airspace disease. XR CHEST PORT    Result Date: 9/30/2021  1.  No interval change     XR CHEST PORT    Result Date: 9/29/2021  Depth of inspiration is shallow. There are linear densities and patchy densities in the left mid and lower thorax and right base could represent atelectasis or airspace disease. Labs:     Recent Labs     10/04/21  1026 10/03/21  0104   WBC 14.1* 12.7*   HGB 15.8 15.5   HCT 50.6* 49.1   * 110*     Recent Labs     10/04/21  1026 10/03/21  0104 10/02/21  0400    140 132*   K 3.6 2.9* HEMOLYZED,RECOLLECT REQUESTED   CL 97 97 102   CO2 38* 42* 31   BUN 16 14 9   CREA 0.74 0.73 0.69*   * 126* 98   CA 8.7 8.6 8.5   MG 1.6  --   --    PHOS 4.0  --   --      Recent Labs     10/03/21  0104 10/02/21  0400   ALT 30 33   * 146*   TBILI 0.8 0.7   TP 6.4 6.5   ALB 3.4* 2.6*   GLOB 3.0 3.9     No results for input(s): INR, PTP, APTT, INREXT, INREXT in the last 72 hours. No results for input(s): FE, TIBC, PSAT, FERR in the last 72 hours. No results found for: FOL, RBCF   No results for input(s): PH, PCO2, PO2 in the last 72 hours. No results for input(s): CPK, CKNDX, TROIQ in the last 72 hours.     No lab exists for component: CPKMB  No results found for: CHOL, CHOLX, CHLST, CHOLV, HDL, HDLP, LDL, LDLC, DLDLP, TGLX, TRIGL, TRIGP, CHHD, CHHDX  No results found for: [facility]  Lab Results   Component Value Date/Time    Color YELLOW/STRAW 09/29/2021 06:20 PM    Appearance CLEAR 09/29/2021 06:20 PM    Specific gravity 1.021 09/29/2021 06:20 PM    pH (UA) 6.5 09/29/2021 06:20 PM    Protein TRACE (A) 09/29/2021 06:20 PM    Glucose Negative 09/29/2021 06:20 PM    Ketone Negative 09/29/2021 06:20 PM    Bilirubin Negative 09/29/2021 06:20 PM    Urobilinogen 1.0 09/29/2021 06:20 PM    Nitrites Negative 09/29/2021 06:20 PM    Leukocyte Esterase MODERATE (A) 09/29/2021 06:20 PM    Epithelial cells FEW 09/29/2021 06:20 PM    Bacteria Negative 09/29/2021 06:20 PM    WBC 0-4 09/29/2021 06:20 PM    RBC 0-5 09/29/2021 06:20 PM         Medications Reviewed:     Current Facility-Administered Medications Medication Dose Route Frequency    bumetanide (BUMEX) injection 0.5 mg  0.5 mg IntraVENous ONCE    lidocaine 4 % patch 1 Patch  1 Patch TransDERmal Q24H    diclofenac (VOLTAREN) 1 % topical gel 2 g  2 g Topical QID PRN    doxycycline (VIBRAMYCIN) 100 mg in 0.9% sodium chloride (MBP/ADV) 100 mL MBP  100 mg IntraVENous Q12H    influenza vaccine 2021-22 (6 mos+)(PF) (FLUARIX/FLULAVAL/FLUZONE QUAD) injection 0.5 mL  1 Each IntraMUSCular PRIOR TO DISCHARGE    cefTRIAXone (ROCEPHIN) 1 g in sterile water (preservative free) 10 mL IV syringe  1 g IntraVENous Q24H    atorvastatin (LIPITOR) tablet 30 mg  30 mg Oral QHS    balsam peru-castor oiL (VENELEX) ointment   Topical TID    busPIRone (BUSPAR) tablet 10 mg  10 mg Oral BID    FLUoxetine (PROzac) capsule 60 mg  60 mg Oral DAILY    lisinopriL (PRINIVIL, ZESTRIL) tablet 10 mg  10 mg Oral DAILY    senna-docusate (PERICOLACE) 8.6-50 mg per tablet 1 Tablet  1 Tablet Oral BID    traZODone (DESYREL) tablet 100 mg  100 mg Oral QHS    brimonidine (ALPHAGAN) 0.2 % ophthalmic solution 1 Drop  1 Drop Both Eyes DAILY    latanoprost (XALATAN) 0.005 % ophthalmic solution 1 Drop  1 Drop Both Eyes QPM    ALPRAZolam (XANAX) tablet 1 mg  1 mg Oral BID PRN    ARIPiprazole (ABILIFY) tablet 2 mg  2 mg Oral DAILY    sodium chloride (NS) flush 5-40 mL  5-40 mL IntraVENous Q8H    sodium chloride (NS) flush 5-40 mL  5-40 mL IntraVENous PRN    acetaminophen (TYLENOL) tablet 650 mg  650 mg Oral Q6H PRN    Or    acetaminophen (TYLENOL) suppository 650 mg  650 mg Rectal Q6H PRN    polyethylene glycol (MIRALAX) packet 17 g  17 g Oral DAILY PRN    ondansetron (ZOFRAN ODT) tablet 4 mg  4 mg Oral Q8H PRN    Or    ondansetron (ZOFRAN) injection 4 mg  4 mg IntraVENous Q6H PRN    heparin (porcine) injection 5,000 Units  5,000 Units SubCUTAneous Q8H     ______________________________________________________________________  EXPECTED LENGTH OF STAY: 3d 14h  ACTUAL LENGTH OF STAY: Gavin Ordonez MD

## 2021-10-05 LAB
ALBUMIN SERPL-MCNC: 3.1 G/DL (ref 3.5–5)
ALBUMIN/GLOB SERPL: 1.1 {RATIO} (ref 1.1–2.2)
ALP SERPL-CCNC: 149 U/L (ref 45–117)
ALT SERPL-CCNC: 34 U/L (ref 12–78)
ANION GAP SERPL CALC-SCNC: 3 MMOL/L (ref 5–15)
AST SERPL-CCNC: 35 U/L (ref 15–37)
BASOPHILS # BLD: 0 K/UL (ref 0–0.1)
BASOPHILS NFR BLD: 0 % (ref 0–1)
BILIRUB SERPL-MCNC: 0.6 MG/DL (ref 0.2–1)
BUN SERPL-MCNC: 21 MG/DL (ref 6–20)
BUN/CREAT SERPL: 27 (ref 12–20)
CALCIUM SERPL-MCNC: 9 MG/DL (ref 8.5–10.1)
CHLORIDE SERPL-SCNC: 98 MMOL/L (ref 97–108)
CO2 SERPL-SCNC: 37 MMOL/L (ref 21–32)
CREAT SERPL-MCNC: 0.78 MG/DL (ref 0.7–1.3)
DIFFERENTIAL METHOD BLD: ABNORMAL
EOSINOPHIL # BLD: 0.2 K/UL (ref 0–0.4)
EOSINOPHIL NFR BLD: 1 % (ref 0–7)
ERYTHROCYTE [DISTWIDTH] IN BLOOD BY AUTOMATED COUNT: 12.9 % (ref 11.5–14.5)
GLOBULIN SER CALC-MCNC: 2.8 G/DL (ref 2–4)
GLUCOSE SERPL-MCNC: 104 MG/DL (ref 65–100)
HCT VFR BLD AUTO: 49.6 % (ref 36.6–50.3)
HGB BLD-MCNC: 15.2 G/DL (ref 12.1–17)
IMM GRANULOCYTES # BLD AUTO: 0 K/UL
IMM GRANULOCYTES NFR BLD AUTO: 0 %
LYMPHOCYTES # BLD: 11.2 K/UL (ref 0.8–3.5)
LYMPHOCYTES NFR BLD: 66 % (ref 12–49)
MCH RBC QN AUTO: 29 PG (ref 26–34)
MCHC RBC AUTO-ENTMCNC: 30.6 G/DL (ref 30–36.5)
MCV RBC AUTO: 94.7 FL (ref 80–99)
METAMYELOCYTES NFR BLD MANUAL: 1 %
MONOCYTES # BLD: 1.5 K/UL (ref 0–1)
MONOCYTES NFR BLD: 9 % (ref 5–13)
NEUTS SEG # BLD: 3.9 K/UL (ref 1.8–8)
NEUTS SEG NFR BLD: 23 % (ref 32–75)
NRBC # BLD: 0 K/UL (ref 0–0.01)
NRBC BLD-RTO: 0 PER 100 WBC
PLATELET # BLD AUTO: 109 K/UL (ref 150–400)
PMV BLD AUTO: 10.4 FL (ref 8.9–12.9)
POTASSIUM SERPL-SCNC: 3.2 MMOL/L (ref 3.5–5.1)
PROT SERPL-MCNC: 5.9 G/DL (ref 6.4–8.2)
RBC # BLD AUTO: 5.24 M/UL (ref 4.1–5.7)
RBC MORPH BLD: ABNORMAL
SODIUM SERPL-SCNC: 138 MMOL/L (ref 136–145)
WBC # BLD AUTO: 16.9 K/UL (ref 4.1–11.1)

## 2021-10-05 PROCEDURE — 97530 THERAPEUTIC ACTIVITIES: CPT

## 2021-10-05 PROCEDURE — 74011250636 HC RX REV CODE- 250/636: Performed by: STUDENT IN AN ORGANIZED HEALTH CARE EDUCATION/TRAINING PROGRAM

## 2021-10-05 PROCEDURE — 65660000000 HC RM CCU STEPDOWN

## 2021-10-05 PROCEDURE — 80053 COMPREHEN METABOLIC PANEL: CPT

## 2021-10-05 PROCEDURE — 74011250636 HC RX REV CODE- 250/636: Performed by: INTERNAL MEDICINE

## 2021-10-05 PROCEDURE — 36415 COLL VENOUS BLD VENIPUNCTURE: CPT

## 2021-10-05 PROCEDURE — 74011250637 HC RX REV CODE- 250/637: Performed by: INTERNAL MEDICINE

## 2021-10-05 PROCEDURE — 74011000250 HC RX REV CODE- 250: Performed by: INTERNAL MEDICINE

## 2021-10-05 PROCEDURE — 74011000258 HC RX REV CODE- 258: Performed by: INTERNAL MEDICINE

## 2021-10-05 PROCEDURE — 97116 GAIT TRAINING THERAPY: CPT

## 2021-10-05 PROCEDURE — 74011250637 HC RX REV CODE- 250/637: Performed by: STUDENT IN AN ORGANIZED HEALTH CARE EDUCATION/TRAINING PROGRAM

## 2021-10-05 PROCEDURE — 85025 COMPLETE CBC W/AUTO DIFF WBC: CPT

## 2021-10-05 PROCEDURE — 99233 SBSQ HOSP IP/OBS HIGH 50: CPT | Performed by: INTERNAL MEDICINE

## 2021-10-05 RX ORDER — POTASSIUM CHLORIDE 750 MG/1
40 TABLET, FILM COATED, EXTENDED RELEASE ORAL
Status: COMPLETED | OUTPATIENT
Start: 2021-10-05 | End: 2021-10-05

## 2021-10-05 RX ADMIN — HEPARIN SODIUM 5000 UNITS: 5000 INJECTION INTRAVENOUS; SUBCUTANEOUS at 08:17

## 2021-10-05 RX ADMIN — CASTOR OIL AND BALSAM, PERU: 788; 87 OINTMENT TOPICAL at 22:18

## 2021-10-05 RX ADMIN — FLUOXETINE 60 MG: 20 CAPSULE ORAL at 08:17

## 2021-10-05 RX ADMIN — DOXYCYCLINE 100 MG: 100 INJECTION, POWDER, LYOPHILIZED, FOR SOLUTION INTRAVENOUS at 09:54

## 2021-10-05 RX ADMIN — BUSPIRONE HYDROCHLORIDE 10 MG: 5 TABLET ORAL at 17:15

## 2021-10-05 RX ADMIN — CEFTRIAXONE SODIUM 1 G: 1 INJECTION, POWDER, FOR SOLUTION INTRAMUSCULAR; INTRAVENOUS at 08:18

## 2021-10-05 RX ADMIN — ARIPIPRAZOLE 2 MG: 2 TABLET ORAL at 08:18

## 2021-10-05 RX ADMIN — POTASSIUM CHLORIDE 40 MEQ: 750 TABLET, FILM COATED, EXTENDED RELEASE ORAL at 09:56

## 2021-10-05 RX ADMIN — LISINOPRIL 10 MG: 10 TABLET ORAL at 08:17

## 2021-10-05 RX ADMIN — Medication 10 ML: at 22:19

## 2021-10-05 RX ADMIN — HEPARIN SODIUM 5000 UNITS: 5000 INJECTION INTRAVENOUS; SUBCUTANEOUS at 16:03

## 2021-10-05 RX ADMIN — BRIMONIDINE TARTRATE 1 DROP: 2 SOLUTION OPHTHALMIC at 08:20

## 2021-10-05 RX ADMIN — ATORVASTATIN CALCIUM 30 MG: 20 TABLET, FILM COATED ORAL at 22:18

## 2021-10-05 RX ADMIN — CASTOR OIL AND BALSAM, PERU: 788; 87 OINTMENT TOPICAL at 08:18

## 2021-10-05 RX ADMIN — CASTOR OIL AND BALSAM, PERU: 788; 87 OINTMENT TOPICAL at 16:03

## 2021-10-05 RX ADMIN — DOXYCYCLINE 100 MG: 100 INJECTION, POWDER, LYOPHILIZED, FOR SOLUTION INTRAVENOUS at 22:17

## 2021-10-05 RX ADMIN — HEPARIN SODIUM 5000 UNITS: 5000 INJECTION INTRAVENOUS; SUBCUTANEOUS at 01:07

## 2021-10-05 RX ADMIN — LATANOPROST 1 DROP: 50 SOLUTION OPHTHALMIC at 17:17

## 2021-10-05 RX ADMIN — BUSPIRONE HYDROCHLORIDE 10 MG: 5 TABLET ORAL at 08:17

## 2021-10-05 RX ADMIN — TRAZODONE HYDROCHLORIDE 100 MG: 100 TABLET ORAL at 22:18

## 2021-10-05 NOTE — PROGRESS NOTES
Transition of Care  1. RUR 20% Moderate  2. Disposition [facility], pending medical stability   3. DME Walker  4. Transportation BLS   5. Follow Up PCP/Specialist      CM met with patient to provide SNF choice. Patient has chosen [facility] as first choice. CM completed referral via [facility] in [place] and placed follow up call to justin Hussein [phone] who confirmed acceptance and bed availability-can admit after 1pm. CM messaged attending. Patient reports that he received his COVID vaccination at [address] ([address] Per nurse, patient is refusing bipap and is currently on 4L oxygen. CM to monitor. Transition of Care Plan:     The Plan for Transition of Care is related to the following treatment goals: [facility]    The Patient and/or patient representative was provided with a choice of provider and agrees  with the discharge plan. Yes [x] No []    A Freedom of choice list was provided with basic dialogue that supports the patient's individualized plan of care/goals and shares the quality data associated with the providers. Yes [x] No []    Medicare pt has received, reviewed, and signed 2nd IM letter informing them of their right to appeal the discharge. Signed copy has been placed on pt bedside chart. Fain Libman, MS    12:20    Transition of Care Plan to SNF/Rehab    SNF/Rehab Transition:  Patient has been accepted to [facility] and meets criteria for admission. Patient will transported by [facility] and expected to leave at 1500. Communication to Patient/Family:  Met with patient and they are agreeable to the transition plan. Communication to SNF/Rehab:  Bedside RN has been notified to update the transition plan to the facility and call report [phone], [facility], Room 123P. Discharge information has been updated on the AVS.     Discharge instructions will be available via [facility] in [place].      Nursing Please include all hard scripts for controlled substances, med rec and dc summary, and AVS in packet. Reviewed and confirmed with [facility] facility can manage the patient care needs for the following:     SNF/Rehab Transition:  Patient to follow-up with Home Health: PCP/Specialist  Anthony with (X) only those applicable:    Medication:  [x]  Medications will be available at the facility  []  IV Antibiotics   []  Controlled Substance - hard copy to be sent with patient   []  Weekly Labs   Documents:  [x] Hard RX  [x] MAR  [x] Kardex  [x] AVS  [x]Transfer Summary  [x]Discharge   Equipment:  []  CPAP/BiPAP  []  Wound Vacuum  []  Lozano or Urinary Device  []  PICC/Central Line  []  Nebulizer  []  Ventilator   Treatment:  []Isolation (for MRSA, VRE, etc.)  []Surgical Drain Management  []Tracheostomy Care  []Dressing Changes  []Dialysis with transportation and chair time   []PEG Care  [x]Oxygen  []Daily Weights for Heart Failure   Dietary:  []Any diet limitations  []Tube Feedings   []Total Parenteral Management (TPN)   Eligible for Medicaid Long Term Services and Supports  Yes:  [] Eligible for medical assistance or will become eligible within 180 days and UAI completed. [] Provider/Patient and/or support system has requested screening. [] UAI copy provided to patient or responsible party  [x] UAI unavailable at discharge will send once processed to SNF provider. [] UAI unavailable at discharged mailed to patient  No:   [] Private pay and is not financially eligible for Medicaid within the next 180 days. [] Reside out-of-state.   [] A residents of a state owned/operated facility that is licensed  by [address] and [facility] or [facility]  [] Enrollment in [facility] hospice services  [] [address] [place] [address] [facility]  [] Patient /Family declines to have screening completed or provide financial information for screening     Financial Resources:  Medicaid    [] Initiated and application pending   [] Full coverage     Advanced Care Plan:  []Surrogate Decision Maker of Care  []POA  [x]Communicated Code Status  DNR   Other     Lexus Flynn, MS       12:59 CM advised by attending that patient is not medically stable for DC today but possibly Thursday. CM to update [facility] with [facility] and cancel transport.       Lexus Flynn, MS

## 2021-10-05 NOTE — PROGRESS NOTES
[address] [facility] [place] [facility]  Hospitalist Group                                                                                          Hospitalist Progress Note  Melinda Schwartz MD  Answering service: [phone] from in house phone        Date of Service:  10/5/2021  NAME:  Judy Smith  :    MRN:  [id number]      Admission Summary:   Judy Smith is a 77 y.o. male w/ hx of mdd w/ SERGEI, htn, glaucoma, NIDDM II, Dyslipidemia who is referred to hospital from [address] [place] [address] for hypoxia. Incidental hypoxia noted during routine vitals. Pt states he feels well otherwise and had no acute complaints or concerns. Reported hypoxia to 60s prior to EMS arrival.  Concerned for \"lesions\" on his buttocks noted about a week ago, recurrent and intermittently painful. Saw his PCP abiut these lesion and was prescribed a cream. Denies melena or dyschezia. The patient denies any fever, cough, chills, chest or abdominal pain, nausea, vomiting, cough, congestion, recent illness, palpitations, or dysuria.     Remarkable vitals on ER Presentations: bp 158/98, o2 sats 98% on 8L Non-rebreather  Labs Remarkable for: wbc 15, k 3.3, glu 165, rapid covid neg  ER Images: Patchy densities in the left mid and lower thorax and right base could represent  atelectasis or airspace disease. ER Rx: rocephin 1g    Interval history / Subjective: Follow up shortness of breath, hypoxia. Patient seen and examined at the bedside. Labs, images and notes reviewed  Discussed with nursing staff, orders reviewed. Plan discussed with patient/Family  Feeling okay. Gets intermittently anxious with increased oxygen requirement to 5 LPM O2 NC. Hematooncology recommendations noted. Pending FISH test.  Need for CT A/P based on the results. D/W case management team.  Accepted at [address]:     #Shortness of breath, infection VS atelectasis VS pulmonary edema.  Somewhat better but still intermittent SOB.  May be some anxiety component. #Acute hypoxic and hypercarbic respiratory failure. On 46 LPM O2 NC. If not able to wean, consider reassessment with CXR/CT chest.  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 Pending FISH test.  #Hypokalemia  #HTN  #HLD  #DM2, controlled  #MDD with ESRGEI  #Glaucoma  #Obesity, weight 211 LB, pending height assessment and BMI assessment    -Admitted to inpatient, telemetry monitoring in IMCU  -BiPAP as needed. Did not tolerate 10/1/2021 night. Not using/refusing as not able to tolerate per patient  -Repeat CXR, ABG, procalcitonin, proBNP from 9/30/2021 noted. -CXR 10/3/2021 noted. -Bumex IV 0.5 mg x 1 10/2/2021. Well-tolerated as per previous day with 1 mg on 10/2/2021  -Continue Bumex 0.5 mg IV x1 on 10/4/2021  -If BMP continues to remain stable, another trial of Bumex 10/6  -Monitor CBC closely. Mild up trended leukocytosis from 14.1-16.9 on 10/5/2021 with predominantly lymphocytes. -Oncology team on board. Appreciate recommendations. -FISH testing pending per oncology. Based on the results, CT A/P per oncology  -Patient would need outpatient hematooncology follow-up  -Monitor BMP closely  -Echocardiogram 10/1/21 noted, EF 55 to 60%. -Continue to wean O2 as tolerated. -Currently on IV antibioticsRocephin and Doxycycline for total 7 days. On admission, procalcitonin <0.05.   -Monitor I/Os. -Consider pulmonology consultation if unable to wean O2.  -Continue CBC, BMP/CMP monitoring  -Continue other home medications as already ordered. -D/W pharmacy team.    Guarded prognosis.      Code status: DNR  DVT prophylaxis: Heparin SQ    Care Plan discussed with: Patient/Family, Nurse and   Anticipated Disposition: Home w/Family and TBD as per PT OT  Anticipated Discharge: Greater than 48 hours     Hospital Problems  Never Reviewed        Codes Class Noted POA    Respiratory failure with hypoxia [facility]) ICD-10-CM: J96.91  ICD-9-CM: 518.81  9/29/2021 Unknown                Review of Systems:   A comprehensive review of systems was negative except for that written in the HPI. Vital Signs:    Last 24hrs VS reviewed since prior progress note. Most recent are:  Visit Vitals  /62 (BP 1 Location: Left upper arm, BP Patient Position: Sitting)   Pulse 61   Temp 98.4 °F (36.9 °C)   Resp 18   Wt 86.5 kg (190 lb 11.2 oz)   SpO2 94%         Intake/Output Summary (Last 24 hours) at 10/5/2021 1715  Last data filed at 10/5/2021 1506  Gross per 24 hour   Intake 320 ml   Output 1190 ml   Net -870 ml        Physical Examination:     I had a face to face encounter with this patient and independently examined them on 10/5/2021 as outlined below: Unchanged from previous day. Constitutional:  No distress at rest.  Cooperative, pleasant. Alert, awake, comfortable on 6 LPM O2 NC after exertion. Currently on 5 LPM O2 NC   ENT:  Oral mucosa moist, oropharynx benign. Resp:  CTA bilaterally. No wheezing/rhonchi/rales. No accessory muscle use   CV:  Regular rhythm, normal rate, no murmurs, gallops, rubs    GI:  Soft, non distended, non tender. normoactive bowel sounds, no hepatosplenomegaly     Musculoskeletal:  No edema, warm, 2+ pulses throughout    Neurologic:  Moves all extremities. AAOx3, CN II-XII reviewed            Data Review:    Review and/or order of clinical lab test  Review and/or order of tests in the radiology section of CPT  Review and/or order of tests in the medicine section of CPT    CTA CHEST W OR W WO CONT    Result Date: 9/29/2021  1.  Focal airspace infiltrate anterior left upper lobe suspicious for possible focus of pneumonia with nonspecific broad areas of groundglass opacity which may reflect pulmonary edema, differential aeration, pneumonitis, or infectious process. 2. Hilar and mediastinal adenopathy which may be reactive and for which nonemergent follow-up is recommended. 3. Additional incidentals as above including coronary artery disease. XR CHEST PORT    Result Date: 10/3/2021  Interval improvement in the previously seen areas of airspace disease. XR CHEST PORT    Result Date: 9/30/2021  1. No interval change     XR CHEST PORT    Result Date: 9/29/2021  Depth of inspiration is shallow. There are linear densities and patchy densities in the left mid and lower thorax and right base could represent atelectasis or airspace disease. Labs:     Recent Labs     10/05/21  0113 10/04/21  1026   WBC 16.9* 14.1*   HGB 15.2 15.8   HCT 49.6 50.6*   * 112*     Recent Labs     10/05/21  0113 10/04/21  1026 10/03/21  0104    136 140   K 3.2* 3.6 2.9*   CL 98 97 97   CO2 37* 38* 42*   BUN 21* 16 14   CREA 0.78 0.74 0.73   * 146* 126*   CA 9.0 8.7 8.6   MG  --  1.6  --    PHOS  --  4.0  --      Recent Labs     10/05/21  0113 10/03/21  0104   ALT 34 30   * 165*   TBILI 0.6 0.8   TP 5.9* 6.4   ALB 3.1* 3.4*   GLOB 2.8 3.0     No results for input(s): INR, PTP, APTT, INREXT, INREXT in the last 72 hours. No results for input(s): FE, TIBC, PSAT, FERR in the last 72 hours. No results found for: FOL, RBCF   No results for input(s): PH, PCO2, PO2 in the last 72 hours. No results for input(s): CPK, CKNDX, TROIQ in the last 72 hours.     No lab exists for component: CPKMB  No results found for: CHOL, CHOLX, CHLST, CHOLV, HDL, HDLP, LDL, LDLC, DLDLP, TGLX, TRIGL, TRIGP, CHHD, CHHDX  No results found for: [facility]  Lab Results   Component Value Date/Time    Color YELLOW/STRAW 09/29/2021 06:20 PM    Appearance CLEAR 09/29/2021 06:20 PM    Specific gravity 1.021 09/29/2021 06:20 PM    pH (UA) 6.5 09/29/2021 06:20 PM    Protein TRACE (A) 09/29/2021 06:20 PM    Glucose Negative 09/29/2021 06:20 PM    Ketone Negative 09/29/2021 06:20 PM    Bilirubin Negative 09/29/2021 06:20 PM    Urobilinogen 1.0 09/29/2021 06:20 PM    Nitrites Negative 09/29/2021 06:20 PM    Leukocyte Esterase MODERATE (A) 09/29/2021 06:20 PM    Epithelial cells FEW 09/29/2021 06:20 PM    Bacteria Negative 09/29/2021 06:20 PM    WBC 0-4 09/29/2021 06:20 PM    RBC 0-5 09/29/2021 06:20 PM         Medications Reviewed:     Current Facility-Administered Medications   Medication Dose Route Frequency    lidocaine 4 % patch 1 Patch  1 Patch TransDERmal Q24H    diclofenac (VOLTAREN) 1 % topical gel 2 g  2 g Topical QID PRN    doxycycline (VIBRAMYCIN) 100 mg in 0.9% sodium chloride (MBP/ADV) 100 mL MBP  100 mg IntraVENous Q12H    influenza vaccine 2021-22 (6 mos+)(PF) (FLUARIX/FLULAVAL/FLUZONE QUAD) injection 0.5 mL  1 Each IntraMUSCular PRIOR TO DISCHARGE    cefTRIAXone (ROCEPHIN) 1 g in sterile water (preservative free) 10 mL IV syringe  1 g IntraVENous Q24H    atorvastatin (LIPITOR) tablet 30 mg  30 mg Oral QHS    balsam peru-castor oiL (VENELEX) ointment   Topical TID    busPIRone (BUSPAR) tablet 10 mg  10 mg Oral BID    FLUoxetine (PROzac) capsule 60 mg  60 mg Oral DAILY    lisinopriL (PRINIVIL, ZESTRIL) tablet 10 mg  10 mg Oral DAILY    senna-docusate (PERICOLACE) 8.6-50 mg per tablet 1 Tablet  1 Tablet Oral BID    traZODone (DESYREL) tablet 100 mg  100 mg Oral QHS    brimonidine (ALPHAGAN) 0.2 % ophthalmic solution 1 Drop  1 Drop Both Eyes DAILY    latanoprost (XALATAN) 0.005 % ophthalmic solution 1 Drop  1 Drop Both Eyes QPM    ALPRAZolam (XANAX) tablet 1 mg  1 mg Oral BID PRN    ARIPiprazole (ABILIFY) tablet 2 mg  2 mg Oral DAILY    sodium chloride (NS) flush 5-40 mL  5-40 mL IntraVENous Q8H    sodium chloride (NS) flush 5-40 mL  5-40 mL IntraVENous PRN    acetaminophen (TYLENOL) tablet 650 mg  650 mg Oral Q6H PRN    Or    acetaminophen (TYLENOL) suppository 650 mg  650 mg Rectal Q6H PRN    polyethylene glycol (MIRALAX) packet 17 g  17 g Oral DAILY PRN    ondansetron (ZOFRAN ODT) tablet 4 mg  4 mg Oral Q8H PRN    Or    ondansetron (ZOFRAN) injection 4 mg  4 mg IntraVENous Q6H PRN    heparin (porcine) injection 5,000 Units  5,000 Units SubCUTAneous Q8H     ______________________________________________________________________  EXPECTED LENGTH OF STAY: 3d 14h  ACTUAL LENGTH OF STAY:          6                 Paige Tong MD

## 2021-10-05 NOTE — PROGRESS NOTES
Problem: Mobility Impaired (Adult and Pediatric)  Goal: *Acute Goals and Plan of Care (Insert Text)  Description: FUNCTIONAL STATUS PRIOR TO ADMISSION: Patient was modified independent using a rolling walker for functional mobility. HOME SUPPORT PRIOR TO ADMISSION: The patient lived [facility]. Physical Therapy Goals  Initiated 9/30/2021  1. Patient will move from supine to sit and sit to supine in bed with independence within 7 day(s). 2.  Patient will transfer from bed to chair and chair to bed with modified independence using the least restrictive device within 7 day(s). 3.  Patient will perform sit to stand with minimal assistance/contact guard assist within 7 day(s). 4.  Patient will ambulate with minimal assistance/contact guard assist for 120 feet with the least restrictive device within 7 day(s). Outcome: Progressing Towards Goal   PHYSICAL THERAPY TREATMENT  Patient: Orly More (25 y.o. male)  Date: 10/5/2021  Diagnosis: Respiratory failure with hypoxia ([facility] [address] [J96.91] <principal problem not specified>       Precautions: Fall, Skin, Bed Alarm  Chart, physical therapy assessment, plan of care and goals were reviewed. ASSESSMENT  Patient continues with skilled PT services and is progressing towards goals. Pt continues to be limited by impaired standing balance and need for frequent rest breaks. Today he was able to increase the distance of amb but it took three amb trials to complete 45 feet of amb (15 feet X 3 reps). He remains on 02 at 5 liters and his 02 sats with activity ranged from 91-95% (mostly in the low 90s). Educated him on pursed lip breathing technique as well as use of incentive spirometer. He will need additional instruction to complete either one effectively. As he presents today, continue to recommend rehab at SNF level.     Current Level of Function Impacting Discharge (mobility/balance): stand by assist to contact guard but with a significant amount of ongoing encouragement and verbal cues. Other factors to consider for discharge: From assisted living. At baseline uses no supplemental 02. PLAN :  Patient continues to benefit from skilled intervention to address the above impairments. Continue treatment per established plan of care. to address goals. Recommendation for discharge: (in order for the patient to meet his/her long term goals)  Therapy up to 5 days/week in SNF setting    This discharge recommendation:  A follow-up discussion with the attending provider and/or case management is planned    IF patient discharges home will need the following DME: possibly a wheelchair and some hands on assist. If he were to discharge to home with supplemental 02, he would need assist with management of the nasal cannula. It would present a high fall risk situation for this pt. SUBJECTIVE:   Patient stated I'll do what you tell me to do.     OBJECTIVE DATA SUMMARY:   Chart checked, pt cleared by nursing  Critical Behavior:  Neurologic State: Alert  Orientation Level: Oriented X4  Cognition: Appropriate decision making, Appropriate safety awareness, Appropriate for age attention/concentration, Follows commands  Safety/Judgement: Awareness of environment, Fall prevention  Functional Mobility Training:  Bed Mobility:     Supine to Sit: Stand-by assistance              Transfers:  Sit to Stand: Contact guard assistance  Stand to Sit: Contact guard assistance                             Balance:  Sitting: Intact; Without support  Standing: Impaired  Standing - Static: Constant support;Good  Standing - Dynamic : Constant support;Good  Ambulation/Gait Training:  Distance (ft): 45 Feet (ft) (15 feet X 3)  Assistive Device: Gait belt;Walker, rolling  Ambulation - Level of Assistance: Contact guard assistance        Gait Abnormalities: Decreased step clearance        Base of Support: Widened     Speed/Chana: Slow  Step Length: Left shortened;Right shortened Stairs: Therapeutic Exercises:   Educated him on pursed lip breathing as well as use of incentive spirometer  Pain Rating:  None rated    Activity Tolerance:   Fair, requires rest breaks, and see assessment    After treatment patient left in no apparent distress:   Sitting in chair, Call bell within reach, Bed / chair alarm activated, and on waffle cushion    COMMUNICATION/COLLABORATION:   The patients plan of care was discussed with: Occupational therapist and Registered nurse.      Rose Mary Montejo   Time Calculation: 39 mins

## 2021-10-05 NOTE — PROGRESS NOTES
[facility] [place] [address] [address], [place]: [phone]  F: [phone]    Reason for Evaluation   Norberto Valladares is a 77 y.o. male who is seen in follow up for probable low grade B cell lymphoma, FISH pending. Treatment History:   · None thus far     History of Present Illness:     Mr. Samira Chinchilla is a pleasant 77 y.o. old male who presented to [facility] from [facility] with hypoxia. This was reportedly found on routine vital signs. CTA with focal airspace infiltrate anterior left upper lobe suspicious for possible focus of pneumonia with nonspecific broad areas of groundglass opacity. Hilar and mediastinal adenopathy which may be reactive. CXR today with no interval change. He denies any changes in appetite, weight, night sweats, abdominal pain. CBC with elevated WBC at 12.4, normal Hgb, and mildly decreased platelets. Peripheral smear obtained and resulting with atypical lymphocytes. Interval History: I see Mr. Samira Chinchilla this morning. He reports feeling slightly anxious, remains on supplemental oxygen. This is exacerbated by activity. Continues on IV antibiotics. WBC elevated, though has been since admit with infection. Platelets overall stable > 100k. PMH and PSH  HTN  Glaucoma  NIDDM     Social History     Tobacco Use    Smoking status: Not on file   Substance Use Topics    Alcohol use: Not on file      No family history on file.   Current Facility-Administered Medications   Medication Dose Route Frequency    lidocaine 4 % patch 1 Patch  1 Patch TransDERmal Q24H    diclofenac (VOLTAREN) 1 % topical gel 2 g  2 g Topical QID PRN    doxycycline (VIBRAMYCIN) 100 mg in 0.9% sodium chloride (MBP/ADV) 100 mL MBP  100 mg IntraVENous Q12H    influenza vaccine 2021-22 (6 mos+)(PF) (FLUARIX/FLULAVAL/FLUZONE QUAD) injection 0.5 mL  1 Each IntraMUSCular PRIOR TO DISCHARGE    cefTRIAXone (ROCEPHIN) 1 g in sterile water (preservative free) 10 mL IV syringe  1 g IntraVENous Q24H    atorvastatin (LIPITOR) tablet 30 mg  30 mg Oral QHS    balsam peru-castor oiL (VENELEX) ointment   Topical TID    busPIRone (BUSPAR) tablet 10 mg  10 mg Oral BID    FLUoxetine (PROzac) capsule 60 mg  60 mg Oral DAILY    lisinopriL (PRINIVIL, ZESTRIL) tablet 10 mg  10 mg Oral DAILY    senna-docusate (PERICOLACE) 8.6-50 mg per tablet 1 Tablet  1 Tablet Oral BID    traZODone (DESYREL) tablet 100 mg  100 mg Oral QHS    brimonidine (ALPHAGAN) 0.2 % ophthalmic solution 1 Drop  1 Drop Both Eyes DAILY    latanoprost (XALATAN) 0.005 % ophthalmic solution 1 Drop  1 Drop Both Eyes QPM    ALPRAZolam (XANAX) tablet 1 mg  1 mg Oral BID PRN    ARIPiprazole (ABILIFY) tablet 2 mg  2 mg Oral DAILY    sodium chloride (NS) flush 5-40 mL  5-40 mL IntraVENous Q8H    sodium chloride (NS) flush 5-40 mL  5-40 mL IntraVENous PRN    acetaminophen (TYLENOL) tablet 650 mg  650 mg Oral Q6H PRN    Or    acetaminophen (TYLENOL) suppository 650 mg  650 mg Rectal Q6H PRN    polyethylene glycol (MIRALAX) packet 17 g  17 g Oral DAILY PRN    ondansetron (ZOFRAN ODT) tablet 4 mg  4 mg Oral Q8H PRN    Or    ondansetron (ZOFRAN) injection 4 mg  4 mg IntraVENous Q6H PRN    heparin (porcine) injection 5,000 Units  5,000 Units SubCUTAneous Q8H      No Known Allergies     Review of Systems: A complete review of systems was obtained, negative except as described above.     Physical Exam:     Visit Vitals  /61 (BP 1 Location: Left upper arm, BP Patient Position: At rest)   Pulse 62   Temp 97.9 °F (36.6 °C)   Resp 16   Wt 190 lb 11.2 oz (86.5 kg)   SpO2 94%     General: elderly, frail   Eyes: anicteric sclera  HENT: dry mucous membranes  Neck: Supple  Respiratory: clear anterior lung fields, normal effort at rest with supplemental oxygen  CV: HRR via monitor  GI: Soft, nontender   MS: moving all extremities in bed  Skin: warm  Psych: alert, appropriate, anxious    Results:     Lab Results   Component Value Date/Time    WBC 16.9 (H) 10/05/2021 01:13 AM    HGB 15.2 10/05/2021 01:13 AM    HCT 49.6 10/05/2021 01:13 AM    PLATELET 531 (L) 73/24/3063 01:13 AM    MCV 94.7 10/05/2021 01:13 AM    ABS. NEUTROPHILS 3.9 10/05/2021 01:13 AM     Lab Results   Component Value Date/Time    Sodium 138 10/05/2021 01:13 AM    Potassium 3.2 (L) 10/05/2021 01:13 AM    Chloride 98 10/05/2021 01:13 AM    CO2 37 (H) 10/05/2021 01:13 AM    Glucose 104 (H) 10/05/2021 01:13 AM    BUN 21 (H) 10/05/2021 01:13 AM    Creatinine 0.78 10/05/2021 01:13 AM    GFR est AA >60 10/05/2021 01:13 AM    GFR est non-AA >60 10/05/2021 01:13 AM    Calcium 9.0 10/05/2021 01:13 AM     Lab Results   Component Value Date/Time    Bilirubin, total 0.6 10/05/2021 01:13 AM    ALT (SGPT) 34 10/05/2021 01:13 AM    Alk. phosphatase 149 (H) 10/05/2021 01:13 AM    Protein, total 5.9 (L) 10/05/2021 01:13 AM    Albumin 3.1 (L) 10/05/2021 01:13 AM    Globulin 2.8 10/05/2021 01:13 AM       Peripheral Smear 9/29/21  Peripheral blood smear:        Atypical lymphocytosis, see comment   Mild thrombocytopenia   Red blood cells unremarkable in number and morphology with no increase in   schistocytes       * * *Comment* * *     The atypical lymphocytes are suspicious for involvement by lymphoma. Please correlate with pending flow cytometry studies. 9/29/21 Flow Cytometry  Interpretation   Flow cytometry:   Diagnosis: Monoclonal B-cells, consistent with a B-cell   lymphoproliferative disorder. Comments: Flow cytometry shows a monoclonal B-cell population (28.5% of   total cells) without detectable CD5, CD10 or CD11c expression, consistent   with a B-cell lymphoma/leukemia. The main differential diagnosis based on   immunophenotype includes, but is not limited to, marginal zone   leukemia/lymphoma, lymphoplasmacytic lymphoma, [id number] follicular   lymphoma and large B-cell lymphoma.  B-lymphocytes appear to be mostly   small (approximately 84%) based on forward light scatter characteristics,   so large B-cell lymphoma is less likely. Please correlate the result with   morphologic findings and clinical information. Flow Differential (%) and Population Analysis:   Lymphocytes (54.2%): A monoclonal B-cell population is detected,   comprising 82.6% of lymphocytes. These clonal B cells express CD19,   CD20,and kappa surface light chain restriction. Approximately 16% of B   cells are intermediate to large in size based on site scatter. T-cells   (30% of lymphoid cells) show a CD4/CD8 ratio about 2.1 without overt   phenotypic abnormality. NK-cells (5% of lymphoid cells) are unremarkable. Markers Performed: CD2, CD3, CD4, CD5, CD7, CD8, CD10, CD11c, CD19, CD20,   CD23, CD34, CD38, CD45, CD56, Kappa, Lambda (17 Markers)     Technical component performed by [employer] in [place], [place].  Interpretation performed by Simone Castellano M.D., Hematopathologist at   [facility] in [address] see scanned   report in [address] [facility] for complete details. CPT: 51675       Results-Comments   Low grade B-cell lymphoma FISH is pending.  Please correlate with clinical   and radiographic findings.  Suspicious masses/lesions or areas of   lymphadenopathy should be sampled for lymphoma workup by core biopsy or   excision. 9/29/21 CTA chest  IMPRESSION     1. Focal airspace infiltrate anterior left upper lobe suspicious for possible  focus of pneumonia with nonspecific broad areas of groundglass opacity which may  reflect pulmonary edema, differential aeration, pneumonitis, or infectious  process. 2. Hilar and mediastinal adenopathy which may be reactive and for which  nonemergent follow-up is recommended. 3. Additional incidentals as above including coronary artery disease. Records reviewed and summarized above. Pathology report(s) reviewed above. Radiology report(s) reviewed above.       Assessment:   1) Low grade B cell lymphoma  Flow cytometry reviewed    CD5, CD10 or CD11c negative small B cells , consistent   with a B-cell lymphoma/leukemia. The main differential diagnosis based on   immunophenotype includes, but is not limited to, marginal zone   leukemia/lymphoma, lymphoplasmacytic lymphoma, [id number] follicular   lymphoma and large B-cell lymphoma    CTA earlier showed small mediastinal nodes which may be reactive  Has chronic stable mild thrombocytopenia    This is likely to be an indolent Lymphoma which could possibly be observed for some time. However FISH is pending for clear categorization. I did discuss a CT abdomen to evaluate the spleen and adenopathy which he declined     2) Respiratory failure  Unclear etiology - imaging with infection vs atelectasis  Continues requiring oxygen support, antibiotics  Management per Hospitalist    3) HTN    4) DM II    5) Thrombocytopenia  Mild . Stable. Likely due to # 1    Plan:     -Declined CT abdomen  - Await FISH results     Dont anticipate additional work up this admission and can be followed Outpatient      I appreciate the opportunity to participate in . [facility] care. I performed a history and physical examination of the patient and discussed his management with the NPP.  I reviewed the NPP note and agree with the documented findings and plan of care    Reviewed the dx of low grade B cell Lymphoma  Currently he is anxious, has respiratory failure unrelated to current Dx  He declined additional testing  Will follow OP    Signed By: Tunde Douglas MD

## 2021-10-06 ENCOUNTER — APPOINTMENT (OUTPATIENT)
Dept: CT IMAGING | Age: 76
DRG: 840 | End: 2021-10-06
Attending: INTERNAL MEDICINE
Payer: MEDICARE

## 2021-10-06 ENCOUNTER — APPOINTMENT (OUTPATIENT)
Dept: GENERAL RADIOLOGY | Age: 76
DRG: 840 | End: 2021-10-06
Attending: INTERNAL MEDICINE
Payer: MEDICARE

## 2021-10-06 LAB
ALBUMIN SERPL-MCNC: 3.1 G/DL (ref 3.5–5)
ALBUMIN/GLOB SERPL: 1 {RATIO} (ref 1.1–2.2)
ALP SERPL-CCNC: 182 U/L (ref 45–117)
ALT SERPL-CCNC: 64 U/L (ref 12–78)
ANION GAP SERPL CALC-SCNC: 4 MMOL/L (ref 5–15)
AST SERPL-CCNC: 71 U/L (ref 15–37)
BASOPHILS # BLD: 0 K/UL (ref 0–0.1)
BASOPHILS NFR BLD: 0 % (ref 0–1)
BILIRUB SERPL-MCNC: 0.6 MG/DL (ref 0.2–1)
BUN SERPL-MCNC: 18 MG/DL (ref 6–20)
BUN/CREAT SERPL: 26 (ref 12–20)
CALCIUM SERPL-MCNC: 8.7 MG/DL (ref 8.5–10.1)
CHLORIDE SERPL-SCNC: 100 MMOL/L (ref 97–108)
CO2 SERPL-SCNC: 34 MMOL/L (ref 21–32)
CREAT SERPL-MCNC: 0.7 MG/DL (ref 0.7–1.3)
DIFFERENTIAL METHOD BLD: ABNORMAL
EOSINOPHIL # BLD: 0.4 K/UL (ref 0–0.4)
EOSINOPHIL NFR BLD: 2 % (ref 0–7)
ERYTHROCYTE [DISTWIDTH] IN BLOOD BY AUTOMATED COUNT: 13 % (ref 11.5–14.5)
GLOBULIN SER CALC-MCNC: 3 G/DL (ref 2–4)
GLUCOSE SERPL-MCNC: 105 MG/DL (ref 65–100)
HCT VFR BLD AUTO: 50.9 % (ref 36.6–50.3)
HGB BLD-MCNC: 16.2 G/DL (ref 12.1–17)
IMM GRANULOCYTES # BLD AUTO: 0 K/UL
IMM GRANULOCYTES NFR BLD AUTO: 0 %
LYMPHOCYTES # BLD: 10.1 K/UL (ref 0.8–3.5)
LYMPHOCYTES NFR BLD: 54 % (ref 12–49)
MCH RBC QN AUTO: 29.6 PG (ref 26–34)
MCHC RBC AUTO-ENTMCNC: 31.8 G/DL (ref 30–36.5)
MCV RBC AUTO: 92.9 FL (ref 80–99)
MONOCYTES # BLD: 0.8 K/UL (ref 0–1)
MONOCYTES NFR BLD: 4 % (ref 5–13)
NEUTS SEG # BLD: 7.5 K/UL (ref 1.8–8)
NEUTS SEG NFR BLD: 40 % (ref 32–75)
NRBC # BLD: 0 K/UL (ref 0–0.01)
NRBC BLD-RTO: 0 PER 100 WBC
PLATELET # BLD AUTO: 104 K/UL (ref 150–400)
PMV BLD AUTO: 10.1 FL (ref 8.9–12.9)
POTASSIUM SERPL-SCNC: 3.6 MMOL/L (ref 3.5–5.1)
PROT SERPL-MCNC: 6.1 G/DL (ref 6.4–8.2)
RBC # BLD AUTO: 5.48 M/UL (ref 4.1–5.7)
RBC MORPH BLD: ABNORMAL
SODIUM SERPL-SCNC: 138 MMOL/L (ref 136–145)
WBC # BLD AUTO: 18.8 K/UL (ref 4.1–11.1)

## 2021-10-06 PROCEDURE — 74011000250 HC RX REV CODE- 250: Performed by: INTERNAL MEDICINE

## 2021-10-06 PROCEDURE — 97535 SELF CARE MNGMENT TRAINING: CPT

## 2021-10-06 PROCEDURE — 74011250637 HC RX REV CODE- 250/637: Performed by: INTERNAL MEDICINE

## 2021-10-06 PROCEDURE — 74011000636 HC RX REV CODE- 636: Performed by: INTERNAL MEDICINE

## 2021-10-06 PROCEDURE — 74011000258 HC RX REV CODE- 258: Performed by: INTERNAL MEDICINE

## 2021-10-06 PROCEDURE — 65660000000 HC RM CCU STEPDOWN

## 2021-10-06 PROCEDURE — 74011250636 HC RX REV CODE- 250/636: Performed by: INTERNAL MEDICINE

## 2021-10-06 PROCEDURE — 74177 CT ABD & PELVIS W/CONTRAST: CPT

## 2021-10-06 PROCEDURE — 71045 X-RAY EXAM CHEST 1 VIEW: CPT

## 2021-10-06 PROCEDURE — 74011250636 HC RX REV CODE- 250/636: Performed by: STUDENT IN AN ORGANIZED HEALTH CARE EDUCATION/TRAINING PROGRAM

## 2021-10-06 PROCEDURE — 74011250637 HC RX REV CODE- 250/637: Performed by: STUDENT IN AN ORGANIZED HEALTH CARE EDUCATION/TRAINING PROGRAM

## 2021-10-06 PROCEDURE — 36415 COLL VENOUS BLD VENIPUNCTURE: CPT

## 2021-10-06 PROCEDURE — 85025 COMPLETE CBC W/AUTO DIFF WBC: CPT

## 2021-10-06 PROCEDURE — 80053 COMPREHEN METABOLIC PANEL: CPT

## 2021-10-06 RX ORDER — BUMETANIDE 0.25 MG/ML
1 INJECTION INTRAMUSCULAR; INTRAVENOUS ONCE
Status: COMPLETED | OUTPATIENT
Start: 2021-10-06 | End: 2021-10-06

## 2021-10-06 RX ORDER — HYDROXYZINE 25 MG/1
25 TABLET, FILM COATED ORAL
Status: DISCONTINUED | OUTPATIENT
Start: 2021-10-06 | End: 2021-10-10 | Stop reason: HOSPADM

## 2021-10-06 RX ORDER — ALPRAZOLAM 0.5 MG/1
0.5 TABLET ORAL
Status: DISCONTINUED | OUTPATIENT
Start: 2021-10-06 | End: 2021-10-10 | Stop reason: HOSPADM

## 2021-10-06 RX ADMIN — LATANOPROST 1 DROP: 50 SOLUTION OPHTHALMIC at 18:24

## 2021-10-06 RX ADMIN — LISINOPRIL 10 MG: 10 TABLET ORAL at 09:32

## 2021-10-06 RX ADMIN — HEPARIN SODIUM 5000 UNITS: 5000 INJECTION INTRAVENOUS; SUBCUTANEOUS at 16:23

## 2021-10-06 RX ADMIN — CEFTRIAXONE SODIUM 1 G: 1 INJECTION, POWDER, FOR SOLUTION INTRAMUSCULAR; INTRAVENOUS at 09:32

## 2021-10-06 RX ADMIN — BRIMONIDINE TARTRATE 1 DROP: 2 SOLUTION OPHTHALMIC at 09:31

## 2021-10-06 RX ADMIN — IOPAMIDOL 100 ML: 755 INJECTION, SOLUTION INTRAVENOUS at 14:56

## 2021-10-06 RX ADMIN — CASTOR OIL AND BALSAM, PERU: 788; 87 OINTMENT TOPICAL at 09:39

## 2021-10-06 RX ADMIN — Medication 10 ML: at 09:40

## 2021-10-06 RX ADMIN — ALPRAZOLAM 1 MG: 1 TABLET ORAL at 01:16

## 2021-10-06 RX ADMIN — SENNOSIDES AND DOCUSATE SODIUM 1 TABLET: 50; 8.6 TABLET ORAL at 09:32

## 2021-10-06 RX ADMIN — BUSPIRONE HYDROCHLORIDE 10 MG: 5 TABLET ORAL at 17:28

## 2021-10-06 RX ADMIN — HEPARIN SODIUM 5000 UNITS: 5000 INJECTION INTRAVENOUS; SUBCUTANEOUS at 23:46

## 2021-10-06 RX ADMIN — FLUOXETINE 60 MG: 20 CAPSULE ORAL at 09:32

## 2021-10-06 RX ADMIN — BUMETANIDE 1 MG: 0.25 INJECTION INTRAMUSCULAR; INTRAVENOUS at 09:32

## 2021-10-06 RX ADMIN — CASTOR OIL AND BALSAM, PERU: 788; 87 OINTMENT TOPICAL at 21:47

## 2021-10-06 RX ADMIN — ATORVASTATIN CALCIUM 30 MG: 20 TABLET, FILM COATED ORAL at 21:45

## 2021-10-06 RX ADMIN — HEPARIN SODIUM 5000 UNITS: 5000 INJECTION INTRAVENOUS; SUBCUTANEOUS at 09:31

## 2021-10-06 RX ADMIN — CASTOR OIL AND BALSAM, PERU: 788; 87 OINTMENT TOPICAL at 16:12

## 2021-10-06 RX ADMIN — TRAZODONE HYDROCHLORIDE 100 MG: 100 TABLET ORAL at 21:45

## 2021-10-06 RX ADMIN — Medication 10 ML: at 15:38

## 2021-10-06 RX ADMIN — ALPRAZOLAM 0.5 MG: 0.5 TABLET ORAL at 13:16

## 2021-10-06 RX ADMIN — Medication 10 ML: at 21:46

## 2021-10-06 RX ADMIN — DICLOFENAC SODIUM 2 G: 10 GEL TOPICAL at 12:43

## 2021-10-06 RX ADMIN — ARIPIPRAZOLE 2 MG: 2 TABLET ORAL at 09:38

## 2021-10-06 RX ADMIN — HYDROXYZINE HYDROCHLORIDE 25 MG: 25 TABLET, FILM COATED ORAL at 18:24

## 2021-10-06 RX ADMIN — DOXYCYCLINE 100 MG: 100 INJECTION, POWDER, LYOPHILIZED, FOR SOLUTION INTRAVENOUS at 09:51

## 2021-10-06 RX ADMIN — Medication 10 ML: at 07:25

## 2021-10-06 RX ADMIN — HEPARIN SODIUM 5000 UNITS: 5000 INJECTION INTRAVENOUS; SUBCUTANEOUS at 01:29

## 2021-10-06 RX ADMIN — ALPRAZOLAM 0.5 MG: 0.5 TABLET ORAL at 21:45

## 2021-10-06 RX ADMIN — BUSPIRONE HYDROCHLORIDE 10 MG: 5 TABLET ORAL at 09:32

## 2021-10-06 NOTE — PROGRESS NOTES
[facility] visit. Mr. Wilner Ring was in bed. Prayer and communion offered and he shared some of his beliefs and what supports him in his lencho. Affirmed that his [place] background is still a part of him along with his [address]. Both experiences are his.       DUNG Burks, RN, ACSW, LCSW   Page:  [phone]

## 2021-10-06 NOTE — PROGRESS NOTES
Problem: Self Care Deficits Care Plan (Adult)  Goal: *Acute Goals and Plan of Care (Insert Text)  Description:   FUNCTIONAL STATUS PRIOR TO ADMISSION: Patient resides at [facility]. Reports being independent in BADLs, to include showering. Patient is mod I for mobility. HOME SUPPORT: Patient from long-term. Occupational Therapy Goals  Initiated 9/30/2021  1. Patient will perform tolerate standing with RW support and CGA in prep for standing ADLs within 7 day(s). 2.  Patient will perform seated / standing bathing with minimal assistance/contact guard assist within 7 day(s). 3.  Patient will perform upper body dressing and lower body dressing with minimal assistance/contact guard assist using RW prn within 7 day(s). 4.  Patient will perform toilet transfers with CGA using RW prn within 7 day(s). 5.  Patient will perform all aspects of toileting with CGA using RW prn within 7 day(s). 6.  Patient will participate in upper extremity therapeutic exercise/activities with supervision/set-up for 7 minutes within 7 day(s). 7.  Patient will utilize energy conservation and fall prevention techniques during functional activities with verbal cues within 7 day(s). Outcome: Progressing Towards Goal   OCCUPATIONAL THERAPY TREATMENT  Patient: Stefani Silva (83 y.o. male)  Date: 10/6/2021  Diagnosis: Respiratory failure with hypoxia ([facility] [address] [J96.91] <principal problem not specified>       Precautions: Fall, Skin, Bed Alarm  Chart, occupational therapy assessment, plan of care, and goals were reviewed. ASSESSMENT  Patient continues with skilled OT services and is progressing towards goals. Patient received in bed on 5L O2 reporting urgent need to use toilet. Patient functional mobility much improved, with patient standing from EOB and ambulating to bathroom with use of RW and overall CGA.  Although patient still has transient confusion, impaired sequencing and problem-solving, and decreased volition, his overall cognition is improved. Patient participating in serial ADL activities in bathroom, to include toileting, bathing, dressing, standing grooming, and seated shaving. Patient tolerating standing at sink approx 10 minutes at a time with intermittent UE support, sequencing brushing teeth without cues. With increased fatigue over course of session, patient benefiting from up to mod cues for sequencing routine tasks and transfers. Patient conversing appropriately throughout session, at times having word-finding difficulty or losing his train of thought but exhibiting overall improved social awareness and memory. Patient opting to return to bedside chair at end of session. Continue to recommend SNF at discharge. At this time, patient requires assist x1 for safe mobility and ADL participation and total assist for management of supplemental O2. Current Level of Function Impacting Discharge (ADLs): CGA; SBA - mod cognitive cues    Other factors to consider for discharge: transient confusion; no supplemental O2 at baseline         PLAN :  Patient continues to benefit from skilled intervention to address the above impairments. Continue treatment per established plan of care to address goals. Recommend with staff: OOB to chair and toileting in bathroom with RW, gait belt, and staff assist x1; encourage patient to attempt transfers / ADLs first before assisting; patient benefits from significant encouragement    Recommend next OT session: lower body ADLs    Recommendation for discharge: (in order for the patient to meet his/her long term goals)  Therapy up to 5 days/week in SNF setting    This discharge recommendation:  Has been made in collaboration with the attending provider and/or case management    IF patient discharges home will need the following DME: TBD       SUBJECTIVE:   Patient stated What do I do next? James Gonzalez . I guess I should take the top off in reference to shaving cream.    OBJECTIVE DATA SUMMARY: Cognitive/Behavioral Status:  Neurologic State: Alert; Appropriate for age  Orientation Level: Oriented X4 (transient confusion)  Cognition: Follows commands;Decreased attention/concentration  Perception: Appears intact  Perseveration: No perseveration noted  Safety/Judgement: Awareness of environment; Fall prevention    Functional Mobility and Transfers for ADLs:  Bed Mobility:  Supine to Sit: Stand-by assistance; Additional time;Bed Modified  Scooting: Stand-by assistance    Transfers:  Sit to Stand: Contact guard assistance  Functional Transfers  Bathroom Mobility: Contact guard assistance  Toilet Transfer : Contact guard assistance; Additional time (from elevated BSC)  Bed to Chair: Contact guard assistance; Adaptive equipment; Additional time    Balance:  Sitting: Intact  Standing - Static: Good;Fair  Standing - Dynamic : Good;Constant support    ADL Intervention:  Grooming  Grooming Assistance: Set-up; Contact guard assistance  Position Performed: Standing  Washing Face: Contact guard assistance  Washing Hands: Contact guard assistance  Brushing Teeth: Contact guard assistance  Shaving: Moderate assistance;Maximum assistance (2/2 fatigue and confusion)  Cues: Verbal cues provided;Visual cues provided    Upper Body Bathing  Bathing Assistance: Minimum assistance  Position Performed:  (seated on toilet)    Lower Body Bathing  Bathing Assistance: Minimum assistance (for distal LEs)    Upper Body Dressing Assistance  Dressing Assistance: [address]: Minimum  assistance    Lower Body Dressing Assistance  Dressing Assistance: Total assistance(dependent)  Socks: Total assistance (dependent)    Toileting  Bladder Hygiene: Minimum assistance  Bowel Hygiene: Maximum assistance  Clothing Management: Minimum assistance  Cues: Verbal cues provided;Visual cues provided  Adaptive Equipment:  (elevated BSC over toilet; grab bars; RW)    Cognitive Retraining  Safety/Judgement: Awareness of environment; Fall prevention    Therapeutic Exercises:   Patient maintaining SpO2 in low-mid 90s throughout session on 5L O2. Pain:  Patient reporting sacral pain at wound site - RN in to apply medicated cream s/p toileting. Activity Tolerance:   Fair and requires rest breaks    After treatment patient left in no apparent distress:   Sitting in chair, Call bell within reach and Bed / chair alarm activated    COMMUNICATION/COLLABORATION:   The patients plan of care was discussed with: Physical therapist and Registered nurse.      Duke Mantilla OT  Time Calculation: 59 mins

## 2021-10-06 NOTE — PROGRESS NOTES
Physical therapy    Attempted to see pt at this time, but transport taking him [facility]SC[facility] for a procedure. Will defer PT at this time and follow up as able.     Professionally,     Brianna Valentin, PTA

## 2021-10-06 NOTE — PROGRESS NOTES
[address] [place] [facility]  Hospitalist Group                                                                                          Hospitalist Progress Note  Rachel Albarran MD  Answering service: [phone] from in house phone        Date of Service:  10/6/2021  NAME:  Kathryn Contreras  :    MRN:  [id number]      Admission Summary:   Kathryn Contreras is a 77 y.o. male w/ hx of mdd w/ SERGEI, htn, glaucoma, NIDDM II, Dyslipidemia who is referred to hospital from [address] [place] [address] for hypoxia. Incidental hypoxia noted during routine vitals. Pt states he feels well otherwise and had no acute complaints or concerns. Reported hypoxia to 60s prior to EMS arrival.  Concerned for \"lesions\" on his buttocks noted about a week ago, recurrent and intermittently painful. Saw his PCP abiut these lesion and was prescribed a cream. Denies melena or dyschezia. The patient denies any fever, cough, chills, chest or abdominal pain, nausea, vomiting, cough, congestion, recent illness, palpitations, or dysuria.     Remarkable vitals on ER Presentations: bp 158/98, o2 sats 98% on 8L Non-rebreather  Labs Remarkable for: wbc 15, k 3.3, glu 165, rapid covid neg  ER Images: Patchy densities in the left mid and lower thorax and right base could represent  atelectasis or airspace disease. ER Rx: rocephin 1g    Interval history / Subjective: Follow up shortness of breath, hypoxia. Patient seen and examined at the bedside. Labs, images and notes reviewed  Discussed with nursing staff, orders reviewed. Plan discussed with patient/Family  Patient is feeling okay. No shortness of breath. Intermittent anxiety spells per nursing team.  Continue to remain on 5 LPM O2 NC. WBC uptrending at 18.8, neutrophils 40 from 23, lymphocytes 54 from 66. FISH in process per oncology request.  Accepted at [address]:     #Shortness of breath, infection VS atelectasis VS pulmonary edema.  Somewhat better but still intermittent SOB. May be some anxiety component. #Acute hypoxic and hypercarbic respiratory failure. On 46 LPM O2 NC. If not able to wean, consider reassessment with CXR/CT chest.  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 Pending FISH test.  #Hypokalemia  #HTN  #HLD  #DM2, controlled  #MDD with SERGEI  #Glaucoma  #Obesity, weight 211 LB, pending height assessment and BMI assessment    -Admitted to inpatient, telemetry monitoring in IMCU  -BiPAP as needed. Did not tolerate 10/1/2021 night. NIV use at night requested but patient refusing.  -Repeat CXR 10/6/2021 requested  -Consider diuretic challenge 10/6  -Repeat CXR, ABG, procalcitonin, proBNP from 9/30/2021 noted. -CXR 10/3/2021 noted. Improved findings.  -Bumex IV 0.5 mg x 1 10/3/2021. Well-tolerated as per previous day with 1 mg on 10/2/2021  -Tolerated Bumex 0.5 mg IV x1 on 10/4/2021.   -Bumex 1 mg IV x1 on 10/6/2021 as BMP stable. Monitor BMP closely  -Monitor CBC closely. Mild up trended leukocytosis from 14.1-16.9 on 10/5/2021 with predominantly lymphocytes. Continue to uptrend as noted above  -Oncology team on board. Appreciate recommendations. -FISH testing pending per oncology. Based on the results, CT A/P with contrast per oncology  -Patient would need outpatient hematooncology follow-up. However subsequently 10/5/21, patient had declined met with hematooncology team.  Will discuss with patient again.  -If hypoxia continues, consider repeat CT chest with contrast along with CT A/P as noted above  -Echocardiogram 10/1/21 noted, EF 55 to 60%. -Continue to wean O2 as tolerated. -Currently on IV antibioticsRocephin and Doxycycline for total 7 days, last day 10/6/2021.   On admission, procalcitonin <0.05.   -Monitor I/Os. -Consider pulmonology consultation if unable to wean O2, especially if patient continues to decline further imaging  -May consider getting 6-minute walk test as well to look into need for home oxygen  -Continue CBC, BMP/CMP monitoring  -Continue other home medications as already ordered. -D/W pharmacy team.    Guarded prognosis. Code status: DNR  DVT prophylaxis: Heparin SQ    Care Plan discussed with: Patient/Family, Nurse and   Anticipated Disposition: Home w/Family and TBD as per PT OT  Anticipated Discharge: 24-48 hours     Hospital Problems  Never Reviewed        Codes Class Noted POA    Respiratory failure with hypoxia [facility]) ICD-10-CM: J96.91  ICD-9-CM: 518.81  9/29/2021 Unknown                Review of Systems:   A comprehensive review of systems was negative except for that written in the HPI. Vital Signs:    Last 24hrs VS reviewed since prior progress note. Most recent are:  Visit Vitals  /67 (BP 1 Location: Left upper arm, BP Patient Position: At rest;Supine)   Pulse (!) 55   Temp 98.1 °F (36.7 °C)   Resp 16   Wt 82.8 kg (182 lb 9.6 oz)   SpO2 94%         Intake/Output Summary (Last 24 hours) at 10/6/2021 0733  Last data filed at 10/6/2021 0045  Gross per 24 hour   Intake    Output 790 ml   Net -790 ml        Physical Examination:     I had a face to face encounter with this patient and independently examined them on 10/6/2021 as outlined below: Unchanged from previous day          Constitutional:  No distress at rest.  Cooperative, pleasant. Alert, awake, comfortable on 6 LPM O2 NC after exertion. On 5 LPM O2 NC   ENT:  Oral mucosa moist, oropharynx benign. Resp:  CTA bilaterally. No wheezing/rhonchi/rales. No accessory muscle use   CV:  Regular rhythm, normal rate, no murmurs, gallops, rubs    GI:  Soft, non distended, non tender.  normoactive bowel sounds, no hepatosplenomegaly     Musculoskeletal:  No edema, warm, 2+ pulses throughout    Neurologic:  Moves all extremities. AAOx3, CN II-XII reviewed            Data Review:    Review and/or order of clinical lab test  Review and/or order of tests in the radiology section of CPT  Review and/or order of tests in the medicine section of CPT    CTA CHEST W OR W WO CONT    Result Date: 9/29/2021  1. Focal airspace infiltrate anterior left upper lobe suspicious for possible focus of pneumonia with nonspecific broad areas of groundglass opacity which may reflect pulmonary edema, differential aeration, pneumonitis, or infectious process. 2. Hilar and mediastinal adenopathy which may be reactive and for which nonemergent follow-up is recommended. 3. Additional incidentals as above including coronary artery disease. XR CHEST PORT    Result Date: 10/3/2021  Interval improvement in the previously seen areas of airspace disease. XR CHEST PORT    Result Date: 9/30/2021  1. No interval change     XR CHEST PORT    Result Date: 9/29/2021  Depth of inspiration is shallow. There are linear densities and patchy densities in the left mid and lower thorax and right base could represent atelectasis or airspace disease. Labs:     Recent Labs     10/06/21  0118 10/05/21  0113   WBC 18.8* 16.9*   HGB 16.2 15.2   HCT 50.9* 49.6   * 109*     Recent Labs     10/06/21  0118 10/05/21  0113 10/04/21  1026    138 136   K 3.6 3.2* 3.6    98 97   CO2 34* 37* 38*   BUN 18 21* 16   CREA 0.70 0.78 0.74   * 104* 146*   CA 8.7 9.0 8.7   MG  --   --  1.6   PHOS  --   --  4.0     Recent Labs     10/06/21  0118 10/05/21  0113   ALT 64 34   * 149*   TBILI 0.6 0.6   TP 6.1* 5.9*   ALB 3.1* 3.1*   GLOB 3.0 2.8     No results for input(s): INR, PTP, APTT, INREXT, INREXT in the last 72 hours. No results for input(s): FE, TIBC, PSAT, FERR in the last 72 hours. No results found for: FOL, RBCF   No results for input(s): PH, PCO2, PO2 in the last 72 hours.   No results for input(s): CPK, CKNDX, TROIQ in the last 72 hours.     No lab exists for component: CPKMB  No results found for: CHOL, CHOLX, CHLST, CHOLV, HDL, HDLP, LDL, LDLC, DLDLP, TGLX, TRIGL, TRIGP, CHHD, CHHDX  No results found for: [facility]  Lab Results   Component Value Date/Time    Color YELLOW/STRAW 09/29/2021 06:20 PM    Appearance CLEAR 09/29/2021 06:20 PM    Specific gravity 1.021 09/29/2021 06:20 PM    pH (UA) 6.5 09/29/2021 06:20 PM    Protein TRACE (A) 09/29/2021 06:20 PM    Glucose Negative 09/29/2021 06:20 PM    Ketone Negative 09/29/2021 06:20 PM    Bilirubin Negative 09/29/2021 06:20 PM    Urobilinogen 1.0 09/29/2021 06:20 PM    Nitrites Negative 09/29/2021 06:20 PM    Leukocyte Esterase MODERATE (A) 09/29/2021 06:20 PM    Epithelial cells FEW 09/29/2021 06:20 PM    Bacteria Negative 09/29/2021 06:20 PM    WBC 0-4 09/29/2021 06:20 PM    RBC 0-5 09/29/2021 06:20 PM         Medications Reviewed:     Current Facility-Administered Medications   Medication Dose Route Frequency    lidocaine 4 % patch 1 Patch  1 Patch TransDERmal Q24H    diclofenac (VOLTAREN) 1 % topical gel 2 g  2 g Topical QID PRN    doxycycline (VIBRAMYCIN) 100 mg in 0.9% sodium chloride (MBP/ADV) 100 mL MBP  100 mg IntraVENous Q12H    influenza vaccine 2021-22 (6 mos+)(PF) (FLUARIX/FLULAVAL/FLUZONE QUAD) injection 0.5 mL  1 Each IntraMUSCular PRIOR TO DISCHARGE    cefTRIAXone (ROCEPHIN) 1 g in sterile water (preservative free) 10 mL IV syringe  1 g IntraVENous Q24H    atorvastatin (LIPITOR) tablet 30 mg  30 mg Oral QHS    balsam peru-castor oiL (VENELEX) ointment   Topical TID    busPIRone (BUSPAR) tablet 10 mg  10 mg Oral BID    FLUoxetine (PROzac) capsule 60 mg  60 mg Oral DAILY    lisinopriL (PRINIVIL, ZESTRIL) tablet 10 mg  10 mg Oral DAILY    senna-docusate (PERICOLACE) 8.6-50 mg per tablet 1 Tablet  1 Tablet Oral BID    traZODone (DESYREL) tablet 100 mg  100 mg Oral QHS    brimonidine (ALPHAGAN) 0.2 % ophthalmic solution 1 Drop  1 Drop Both Eyes DAILY    latanoprost (XALATAN) 0.005 % ophthalmic solution 1 Drop  1 Drop Both Eyes QPM    ALPRAZolam (XANAX) tablet 1 mg  1 mg Oral BID PRN    ARIPiprazole (ABILIFY) tablet 2 mg  2 mg Oral DAILY    sodium chloride (NS) flush 5-40 mL  5-40 mL IntraVENous Q8H    sodium chloride (NS) flush 5-40 mL  5-40 mL IntraVENous PRN    acetaminophen (TYLENOL) tablet 650 mg  650 mg Oral Q6H PRN    Or    acetaminophen (TYLENOL) suppository 650 mg  650 mg Rectal Q6H PRN    polyethylene glycol (MIRALAX) packet 17 g  17 g Oral DAILY PRN    ondansetron (ZOFRAN ODT) tablet 4 mg  4 mg Oral Q8H PRN    Or    ondansetron (ZOFRAN) injection 4 mg  4 mg IntraVENous Q6H PRN    heparin (porcine) injection 5,000 Units  5,000 Units SubCUTAneous Q8H     ______________________________________________________________________  EXPECTED LENGTH OF STAY: 3d 14h  ACTUAL LENGTH OF STAY:          7                 Laisha Carnes MD

## 2021-10-07 LAB
ALBUMIN SERPL-MCNC: 3.6 G/DL (ref 3.5–5)
ALBUMIN/GLOB SERPL: 1.1 {RATIO} (ref 1.1–2.2)
ALP SERPL-CCNC: 180 U/L (ref 45–117)
ALT SERPL-CCNC: 61 U/L (ref 12–78)
ANION GAP SERPL CALC-SCNC: 2 MMOL/L (ref 5–15)
AST SERPL-CCNC: 52 U/L (ref 15–37)
BASOPHILS # BLD: 0 K/UL (ref 0–0.1)
BASOPHILS NFR BLD: 0 % (ref 0–1)
BILIRUB SERPL-MCNC: 0.4 MG/DL (ref 0.2–1)
BUN SERPL-MCNC: 21 MG/DL (ref 6–20)
BUN/CREAT SERPL: 22 (ref 12–20)
CALCIUM SERPL-MCNC: 9.4 MG/DL (ref 8.5–10.1)
CHLORIDE SERPL-SCNC: 97 MMOL/L (ref 97–108)
CO2 SERPL-SCNC: 37 MMOL/L (ref 21–32)
CREAT SERPL-MCNC: 0.94 MG/DL (ref 0.7–1.3)
DIFFERENTIAL METHOD BLD: ABNORMAL
EOSINOPHIL # BLD: 0.5 K/UL (ref 0–0.4)
EOSINOPHIL NFR BLD: 2 % (ref 0–7)
ERYTHROCYTE [DISTWIDTH] IN BLOOD BY AUTOMATED COUNT: 13.2 % (ref 11.5–14.5)
GLOBULIN SER CALC-MCNC: 3.2 G/DL (ref 2–4)
GLUCOSE BLD STRIP.AUTO-MCNC: 131 MG/DL (ref 65–117)
GLUCOSE BLD STRIP.AUTO-MCNC: 141 MG/DL (ref 65–117)
GLUCOSE SERPL-MCNC: 192 MG/DL (ref 65–100)
HCT VFR BLD AUTO: 55 % (ref 36.6–50.3)
HGB BLD-MCNC: 17 G/DL (ref 12.1–17)
IMM GRANULOCYTES # BLD AUTO: 0 K/UL
IMM GRANULOCYTES NFR BLD AUTO: 0 %
LYMPHOCYTES # BLD: 14.7 K/UL (ref 0.8–3.5)
LYMPHOCYTES NFR BLD: 66 % (ref 12–49)
MCH RBC QN AUTO: 29.6 PG (ref 26–34)
MCHC RBC AUTO-ENTMCNC: 30.9 G/DL (ref 30–36.5)
MCV RBC AUTO: 95.7 FL (ref 80–99)
MONOCYTES # BLD: 0.5 K/UL (ref 0–1)
MONOCYTES NFR BLD: 2 % (ref 5–13)
NEUTS SEG # BLD: 6.8 K/UL (ref 1.8–8)
NEUTS SEG NFR BLD: 30 % (ref 32–75)
NRBC # BLD: 0.02 K/UL (ref 0–0.01)
NRBC BLD-RTO: 0.1 PER 100 WBC
PLATELET # BLD AUTO: 129 K/UL (ref 150–400)
PMV BLD AUTO: 9.8 FL (ref 8.9–12.9)
POTASSIUM SERPL-SCNC: 4.4 MMOL/L (ref 3.5–5.1)
PROT SERPL-MCNC: 6.8 G/DL (ref 6.4–8.2)
RBC # BLD AUTO: 5.75 M/UL (ref 4.1–5.7)
RBC MORPH BLD: ABNORMAL
SERVICE CMNT-IMP: ABNORMAL
SERVICE CMNT-IMP: ABNORMAL
SODIUM SERPL-SCNC: 136 MMOL/L (ref 136–145)
WBC # BLD AUTO: 22.5 K/UL (ref 4.1–11.1)
WBC MORPH BLD: ABNORMAL

## 2021-10-07 PROCEDURE — 97530 THERAPEUTIC ACTIVITIES: CPT

## 2021-10-07 PROCEDURE — 74011000250 HC RX REV CODE- 250: Performed by: INTERNAL MEDICINE

## 2021-10-07 PROCEDURE — 74011250637 HC RX REV CODE- 250/637: Performed by: INTERNAL MEDICINE

## 2021-10-07 PROCEDURE — 97116 GAIT TRAINING THERAPY: CPT

## 2021-10-07 PROCEDURE — 82962 GLUCOSE BLOOD TEST: CPT

## 2021-10-07 PROCEDURE — 74011250637 HC RX REV CODE- 250/637: Performed by: STUDENT IN AN ORGANIZED HEALTH CARE EDUCATION/TRAINING PROGRAM

## 2021-10-07 PROCEDURE — 36415 COLL VENOUS BLD VENIPUNCTURE: CPT

## 2021-10-07 PROCEDURE — 74011250636 HC RX REV CODE- 250/636: Performed by: INTERNAL MEDICINE

## 2021-10-07 PROCEDURE — 80053 COMPREHEN METABOLIC PANEL: CPT

## 2021-10-07 PROCEDURE — 85025 COMPLETE CBC W/AUTO DIFF WBC: CPT

## 2021-10-07 PROCEDURE — 74011250636 HC RX REV CODE- 250/636: Performed by: STUDENT IN AN ORGANIZED HEALTH CARE EDUCATION/TRAINING PROGRAM

## 2021-10-07 PROCEDURE — 77030037877 HC DRSG MEPILEX >48IN BORD MOLN -A

## 2021-10-07 PROCEDURE — 65660000000 HC RM CCU STEPDOWN

## 2021-10-07 PROCEDURE — 97535 SELF CARE MNGMENT TRAINING: CPT

## 2021-10-07 RX ORDER — INSULIN LISPRO 100 [IU]/ML
INJECTION, SOLUTION INTRAVENOUS; SUBCUTANEOUS
Status: DISCONTINUED | OUTPATIENT
Start: 2021-10-07 | End: 2021-10-10 | Stop reason: HOSPADM

## 2021-10-07 RX ORDER — MAGNESIUM SULFATE 100 %
4 CRYSTALS MISCELLANEOUS AS NEEDED
Status: DISCONTINUED | OUTPATIENT
Start: 2021-10-07 | End: 2021-10-10 | Stop reason: HOSPADM

## 2021-10-07 RX ORDER — DEXTROSE 50 % IN WATER (D50W) INTRAVENOUS SYRINGE
25-50 AS NEEDED
Status: DISCONTINUED | OUTPATIENT
Start: 2021-10-07 | End: 2021-10-10 | Stop reason: HOSPADM

## 2021-10-07 RX ADMIN — DICLOFENAC SODIUM 2 G: 10 GEL TOPICAL at 09:07

## 2021-10-07 RX ADMIN — CEFTRIAXONE SODIUM 1 G: 1 INJECTION, POWDER, FOR SOLUTION INTRAMUSCULAR; INTRAVENOUS at 09:07

## 2021-10-07 RX ADMIN — BUSPIRONE HYDROCHLORIDE 10 MG: 5 TABLET ORAL at 09:06

## 2021-10-07 RX ADMIN — HEPARIN SODIUM 5000 UNITS: 5000 INJECTION INTRAVENOUS; SUBCUTANEOUS at 09:07

## 2021-10-07 RX ADMIN — Medication 10 ML: at 21:50

## 2021-10-07 RX ADMIN — ALPRAZOLAM 0.5 MG: 0.5 TABLET ORAL at 17:05

## 2021-10-07 RX ADMIN — HEPARIN SODIUM 5000 UNITS: 5000 INJECTION INTRAVENOUS; SUBCUTANEOUS at 15:05

## 2021-10-07 RX ADMIN — SENNOSIDES AND DOCUSATE SODIUM 1 TABLET: 50; 8.6 TABLET ORAL at 17:06

## 2021-10-07 RX ADMIN — LISINOPRIL 10 MG: 10 TABLET ORAL at 09:00

## 2021-10-07 RX ADMIN — TRAZODONE HYDROCHLORIDE 100 MG: 100 TABLET ORAL at 21:48

## 2021-10-07 RX ADMIN — BUSPIRONE HYDROCHLORIDE 10 MG: 5 TABLET ORAL at 17:06

## 2021-10-07 RX ADMIN — LATANOPROST 1 DROP: 50 SOLUTION OPHTHALMIC at 19:20

## 2021-10-07 RX ADMIN — CASTOR OIL AND BALSAM, PERU: 788; 87 OINTMENT TOPICAL at 21:53

## 2021-10-07 RX ADMIN — SENNOSIDES AND DOCUSATE SODIUM 1 TABLET: 50; 8.6 TABLET ORAL at 09:06

## 2021-10-07 RX ADMIN — CASTOR OIL AND BALSAM, PERU: 788; 87 OINTMENT TOPICAL at 19:20

## 2021-10-07 RX ADMIN — Medication 10 ML: at 19:21

## 2021-10-07 RX ADMIN — BRIMONIDINE TARTRATE 1 DROP: 2 SOLUTION OPHTHALMIC at 09:09

## 2021-10-07 RX ADMIN — ATORVASTATIN CALCIUM 30 MG: 20 TABLET, FILM COATED ORAL at 21:48

## 2021-10-07 RX ADMIN — ARIPIPRAZOLE 2 MG: 2 TABLET ORAL at 10:00

## 2021-10-07 RX ADMIN — Medication 10 ML: at 07:09

## 2021-10-07 RX ADMIN — FLUOXETINE 60 MG: 20 CAPSULE ORAL at 09:06

## 2021-10-07 RX ADMIN — CASTOR OIL AND BALSAM, PERU: 788; 87 OINTMENT TOPICAL at 09:07

## 2021-10-07 NOTE — WOUND CARE
WOCN Note:      Follow up visit to reassess lucila     Chart shows:  Patient admitted on 9/29/21 with Respiratory failure with hypoxia  PMHX: diabetes, HTN    Assessment:   A&O x 4, Appropriately conversational   Reports no pain   Mobility: Independent  with repositioning. Able to turn independently from side to side in bed   Continence: Continent of urine and stool    Last Neymar Score: 18  Surface: foam mattress  Diet: regular 4 carb choice     From [facility]     Bilateral heel, and sacral skin intact and without erythema   Heels offloaded with pillows    1. POA: 2 healed wounds to bilateral buttocks   Etiology: Friction? Areas resolved     Wound Recommendations:       Continue Venelex to buttocks TID    PI Prevention:  Turn/reposition approximately every 2 hours  Offload heels with pillows at all times in bed. Sacral Foam dressing: lift to assess regularly; change as needed. Discontinue if incontinent. Keep HOB 30 degrees or less to decrease shearing and pressure unless medically contraindicated.  If HOB is to be over 30 degrees, raise knees first then [facility] to prevent sliding   Minimize layers of linen/pads under patient to optimize support surface to one sheet and one incontinence pad   Waffle cushion to chair    Transition of Care: Plan to follow weekly and as needed while admitted to hospital.      Tali Ocampo MSN, RN, [facility], [address] [facility]  Certified Wound and Ostomy Nurse  office [phone]  Can be reached through [employer]  pager [phone] or call  to page

## 2021-10-07 NOTE — CONSULTS
Pulmonary, Critical Care, and Sleep Medicine~Consult Note    Name: Marni Steel MRN: [id number]   : 1945 Hospital: . [facility]    Date: 10/7/2021 2:46 PM Admission: 2021     Impression Plan   1. Acute hypoxic resp failure   2. Abnormal CT scan: findings consistent with lymphoma +/- CAP. SHAYLA anterior small focal area of infiltrate; RLL atx  3. ECHO on 21. EF 55-60%, MV regurgitation. 4. HTN  5. DM II  6. Glaucoma   7. Never smoker  1. Has received diuretics   2. Has completed both rocephin/dox for 7 days  3. Repeat chest film tomorrow  4. Will need repeat chest CT scan in 6 wks; we will conduct PFTs in 1-2 wks following discharge   5. O2 titration above 90%, can wean O2 to room air  6. Heparin proph       Daily Progression:    Consult Note requested by hospitalist.     Patient presented on  secondary to hypoxemia that was noted at his nursing facility, incidentally. He was apparently not symptomatic from a pulmonary aspect. Never smoked. No hx of asthma or pulmonary disease prior to this admission. CT imaging revealed showed splenomegaly, diffuse mediastinal and hilar adenopathy (largest 2.2cm right paratracheal node) and small focal area of GGO in SHAYLA. 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 I have reviewed the labs and previous days notes. Pertinent items are noted in HPI. No past medical history on file. No past surgical history on file. Prior to Admission medications    Medication Sig Start Date End Date Taking? Authorizing Provider   ARIPiprazole (ABILIFY) 2 mg tablet Take 1 Tablet by mouth daily. 21  Yes Provider, Historical   brimonidine (ALPHAGAN) 0.2 % ophthalmic solution Administer 1 Drop to both eyes daily. 21  Yes Provider, Historical   busPIRone (BUSPAR) 10 mg tablet Take 1 Tablet by mouth two (2) times a day.  21  Yes Provider, Historical   FLUoxetine 60 mg tab Take 1 Tablet by mouth daily. 21  Yes Provider, Historical   lisinopriL (PRINIVIL, ZESTRIL) 10 mg tablet Take 1 Tablet by mouth daily. 21  Yes Provider, Historical   metFORMIN (GLUCOPHAGE) 500 mg tablet Take 0.5 Tablets by mouth two (2) times a day. 21  Yes Provider, Historical   sertraline (ZOLOFT) 100 mg tablet Take 1 Tablet by mouth daily. 21  Yes Provider, Historical   traZODone (DESYREL) 100 mg tablet Take 1 Tablet by mouth nightly. 9/10/21  Yes Provider, Historical   latanoprost (XALATAN) 0.005 % ophthalmic solution Administer 1 Drop to both eyes daily. 21  Yes Provider, Historical   icosapent ethyL (VASCEPA) 1 gram capsule Take 1 Capsule by mouth daily. 21  Yes Provider, Historical   ALPRAZolam (XANAX) 1 mg tablet Take 1 Tablet by mouth two (2) times a day. 21  Yes Provider, Historical   atorvastatin (LIPITOR) 20 mg tablet Take 30 mg by mouth nightly. Yes Provider, Historical   acetaminophen (TYLENOL) 325 mg tablet Take 650 mg by mouth two (2) times a day. Yes Provider, Historical   sodium chloride (OCEAN) 0.65 % nasal squeeze bottle 2 Sprays by Both Nostrils route two (2) times a day. Yes Provider, Historical   senna-docusate (PERICOLACE) 8.6-50 mg per tablet Take 1 Tablet by mouth every twelve (12) hours as needed for Constipation. Yes Provider, Historical     No Known Allergies   Social History     Tobacco Use    Smoking status: Not on file   Substance Use Topics    Alcohol use: Not on file      No family history on file.   OBJECTIVE:     Vital Signs:       Visit Vitals  /61 (BP 1 Location: Left arm, BP Patient Position: At rest)   Pulse (!) 56   Temp 97.7 °F (36.5 °C)   Resp 18   Wt 83.9 kg (184 lb 15.5 oz)   SpO2 95%      Temp (24hrs), Av °F (36.7 °C), Min:97.7 °F (36.5 °C), Max:98.2 °F (36.8 °C)     Intake/Output:     Last shift: 10/07 0701 - 10/07 1900  In: 240 [P.O.:240]  Out: -     Last 3 shifts: 10/05 1901 - 10/07 0700  In: 960 [P.O.:960]  Out: 1550 [Urine:1550]          Intake/Output Summary (Last 24 hours) at 10/7/2021 1507  Last data filed at 10/7/2021 0907  Gross per 24 hour   Intake 720 ml   Output 450 ml   Net 270 ml       Physical Exam:                                        Exam Findings Other   General: No resp distress noted, appears stated age    [de-identified]:  No ulcers, JVD not elevated, no cervical LAD    Chest: No pectus deformity, normal chest rise b/l    HEART:  No visible thrills    Lungs:  Normal expansion     ABD: Soft/NT, non rigid mildly distended    EXT: No cyanosis/clubbing/edema, normal peripheral pulses    Skin: No rashes or ulcers, no mottling    Neuro: A/O x 3        Medications:  Current Facility-Administered Medications   Medication Dose Route Frequency    glucose chewable tablet 16 g  4 Tablet Oral PRN    dextrose (D50W) injection syrg 12.5-25 g  25-50 mL IntraVENous PRN    glucagon (GLUCAGEN) injection 1 mg  1 mg IntraMUSCular PRN    insulin lispro (HUMALOG) injection   SubCUTAneous AC&HS    ALPRAZolam (XANAX) tablet 0.5 mg  0.5 mg Oral QID PRN    hydrOXYzine HCL (ATARAX) tablet 25 mg  25 mg Oral QID PRN    lidocaine 4 % patch 1 Patch  1 Patch TransDERmal Q24H    diclofenac (VOLTAREN) 1 % topical gel 2 g  2 g Topical QID PRN    influenza vaccine 2021-22 (6 mos+)(PF) (FLUARIX/FLULAVAL/FLUZONE QUAD) injection 0.5 mL  1 Each IntraMUSCular PRIOR TO DISCHARGE    atorvastatin (LIPITOR) tablet 30 mg  30 mg Oral QHS    balsam peru-castor oiL (VENELEX) ointment   Topical TID    busPIRone (BUSPAR) tablet 10 mg  10 mg Oral BID    FLUoxetine (PROzac) capsule 60 mg  60 mg Oral DAILY    lisinopriL (PRINIVIL, ZESTRIL) tablet 10 mg  10 mg Oral DAILY    senna-docusate (PERICOLACE) 8.6-50 mg per tablet 1 Tablet  1 Tablet Oral BID    traZODone (DESYREL) tablet 100 mg  100 mg Oral QHS    brimonidine (ALPHAGAN) 0.2 % ophthalmic solution 1 Drop  1 Drop Both Eyes DAILY    latanoprost (XALATAN) 0.005 % ophthalmic solution 1 Drop  1 Drop Both Eyes QPM    ARIPiprazole (ABILIFY) tablet 2 mg  2 mg Oral DAILY    sodium chloride (NS) flush 5-40 mL  5-40 mL IntraVENous Q8H    sodium chloride (NS) flush 5-40 mL  5-40 mL IntraVENous PRN    acetaminophen (TYLENOL) tablet 650 mg  650 mg Oral Q6H PRN    Or    acetaminophen (TYLENOL) suppository 650 mg  650 mg Rectal Q6H PRN    polyethylene glycol (MIRALAX) packet 17 g  17 g Oral DAILY PRN    ondansetron (ZOFRAN ODT) tablet 4 mg  4 mg Oral Q8H PRN    Or    ondansetron (ZOFRAN) injection 4 mg  4 mg IntraVENous Q6H PRN    heparin (porcine) injection 5,000 Units  5,000 Units SubCUTAneous Q8H       Labs:  ABG No results for input(s): PHI, PCO2I, PO2I, HCO3I, SO2I, FIO2I in the last 72 hours.      CBC Recent Labs     10/07/21  1130 10/06/21  0118 10/05/21  0113   WBC 22.5* 18.8* 16.9*   HGB 17.0 16.2 15.2   HCT 55.0* 50.9* 49.6   * 104* 109*   MCV 95.7 92.9 94.7   MCH 29.6 29.6 34.6        Metabolic  Panel Recent Labs     10/07/21  1130 10/06/21  0118 10/05/21  0113    138 138   K 4.4 3.6 3.2*   CL 97 100 98   CO2 37* 34* 37*   * 105* 104*   BUN 21* 18 21*   CREA 0.94 0.70 0.78   CA 9.4 8.7 9.0   ALB 3.6 3.1* 3.1*   ALT 61 64 34        Pertinent Labs                Narciso Santana PA-C  10/7/2021

## 2021-10-07 NOTE — PROGRESS NOTES
Problem: Mobility Impaired (Adult and Pediatric)  Goal: *Acute Goals and Plan of Care (Insert Text)  Description: FUNCTIONAL STATUS PRIOR TO ADMISSION: Patient was modified independent using a rolling walker for functional mobility. HOME SUPPORT PRIOR TO ADMISSION: The patient lived [facility]. Physical Therapy Goals  Revised 10/7/2021  1. Patient will move from supine to sit and sit to supine , scoot up and down, and roll side to side in bed with modified independence within 7 day(s). 2.  Patient will transfer from bed to chair and chair to bed with supervision/set-up using the least restrictive device within 7 day(s). 3.  Patient will perform sit to stand with supervision/set-up within 7 day(s). 4.  Patient will ambulate with supervision/set-up for 150 feet with the least restrictive device within 7 day(s). Physical Therapy Goals  Initiated 9/30/2021  1. Patient will move from supine to sit and sit to supine in bed with independence within 7 day(s). 2.  Patient will transfer from bed to chair and chair to bed with modified independence using the least restrictive device within 7 day(s). 3.  Patient will perform sit to stand with minimal assistance/contact guard assist within 7 day(s). 4.  Patient will ambulate with minimal assistance/contact guard assist for 120 feet with the least restrictive device within 7 day(s). Outcome: Progressing Towards Goal   PHYSICAL THERAPY TREATMENT: WEEKLY REASSESSMENT  Patient: Nery Sanchez (42 y.o. male)  Date: 10/7/2021  Primary Diagnosis: Respiratory failure with hypoxia ([facility] [place] [facility] [J96.91]        Precautions:   Fall, Bed Alarm, Skin      ASSESSMENT  Patient continues with skilled PT services and is progressing towards goals. Pt now able to amb increased distance without a sitting rest break, did take two standing rest breaks to amb 90 feet with walker support. At this point he remains dependent on supplemental 02, see below.  He does demonstrate some decreased awareness of his environment and the nasal cannula presents an additional tripping hazard for him. Pt resides alone in assisted living. At this point continue to recommend SNF for rehab. Pulse Oximetry Assessment    98% at rest on room air  84% while ambulating on room air  86% while ambulating on 2LPM    92% while ambulating on 3LPM        Patient's progression toward goals since last assessment: gain made, goals upgraded. Current Level of Function Impacting Discharge (mobility/balance): up to stand by assist provided and cues for safe walker management     Functional Outcome Measure: The patient scored 18/28 on the Tinetti outcome measure which is indicative of high fall risk. .      Other factors to consider for discharge: does not use supplemental 02 at baseline, from assisted living environment           PLAN :  Goals have been updated based on progression since last assessment. Patient continues to benefit from skilled intervention to address the above impairments. Recommendations and Planned Interventions: bed mobility training, transfer training, gait training, therapeutic exercises, patient and family training/education, and therapeutic activities      Frequency/Duration: Patient will be followed by physical therapy:  5 times a week to address goals. Recommendation for discharge: (in order for the patient to meet his/her long term goals)  Therapy up to 5 days/week in SNF setting, if not, then intensive HHPT and assist with mobility to ensure his safety. This discharge recommendation:  A follow-up discussion with the attending provider and/or case management is planned    IF patient discharges home will need the following DME: home 02, and assist with mobility to ensure safety         SUBJECTIVE:   Patient stated that he was feeling better, agreeable to amb. OBJECTIVE DATA SUMMARY:   HISTORY:    No past medical history on file. No past surgical history on file.  Chart checked, pt cleared by nursing  Personal factors and/or comorbidities impacting plan of care: does not use supplemental 02 at baseline, from assisted living environment    Home Situation  Home Environment: Assisted living (nursing home)  [facility]  One/Two Story Residence: One story  Support Systems: Assisted Living [facility]  Current DME Used/Available at Home: Walker, rolling  Tub or Shower Type: Shower    EXAMINATION/PRESENTATION/DECISION MAKING:   Critical Behavior:  Neurologic State: Alert  Orientation Level: Oriented X4  Cognition: Follows commands, Memory loss  Safety/Judgement: Decreased awareness of environment  Hearing: Auditory  Auditory Impairment: None  Skin:  refer to MD and nursing notes  Edema: none noted  Range Of Motion:  AROM: Generally decreased, functional                       Strength:    Strength: Generally decreased, functional                    Tone & Sensation:                  Sensation: Impaired (at baseline in feet)               Coordination:     Vision:      Functional Mobility:  Bed Mobility:         Not assessed, received and left up in the chair. Transfers:  Sit to Stand: Stand-by assistance  Stand to Sit: Stand-by assistance                       Balance:   Sitting: Intact; Without support  Sitting - Static: Good (unsupported)  Sitting - Dynamic: Good (unsupported)  Standing: Impaired  Standing - Static: Constant support;Good  Standing - Dynamic : Constant support; Fair  Ambulation/Gait Training:  Distance (ft): 90 Feet (ft)  Assistive Device: Gait belt;Walker, rolling  Ambulation - Level of Assistance: Contact guard assistance        Gait Abnormalities: Decreased step clearance        Base of Support: Widened     Speed/Chana: Slow  Step Length: Left shortened;Right shortened                     Stairs:               Therapeutic Exercises:   Pursed lip breathing    Functional Measure:  Tinetti test:    Sitting Balance: 1  Arises: 0  Attempts to Rise: 2  Immediate Standing Balance: 1  Standing Balance: 1  Nudged: 2  Eyes Closed: 1  Turn 360 Degrees - Continuous/Discontinuous: 0  Turn 360 Degrees - Steady/Unsteady: 1  Sitting Down: 1  Balance Score: 10 Balance total score  Indication of Gait: 1  R Step Length/Height: 1  L Step Length/Height: 1  R Foot Clearance: 1  L Foot Clearance: 1  Step Symmetry: 1  Step Continuity: 1  Path: 1  Trunk: 0  Walking Time: 0  Gait Score: 8 Gait total score  Total Score: 18/28 Overall total score         Tinetti Tool Score Risk of Falls  <19 = High Fall Risk  19-24 = Moderate Fall Risk  25-28 = Low Fall Risk  Tinetti ME. Performance-Oriented Assessment of Mobility Problems in Elderly Patients. [place] 67; [id number]. (Scoring Description: PT Bulletin Feb. 10, 1993)    Older adults: Juan Garcia et al, 2009; n = 1000 [facility] elderly evaluated with ABC, MARCEL, ADL, and IADL)  · Mean MARCEL score for males aged 69-68 years = 26.21(3.40)  · Mean MARCEL score for females age 69-68 years = 25.16(4.30)  · Mean MARCEL score for males over 80 years = 23.29(6.02)  · Mean MARCEL score for females over 80 years = 17.20(8.32)          Pain Rating:  None rated    Activity Tolerance:   desaturates with exertion and requires oxygen and see assessment    After treatment patient left in no apparent distress:   Sitting in chair, Call bell within reach, and Bed / chair alarm activated    COMMUNICATION/EDUCATION:   The patients plan of care was discussed with: Occupational therapist and Registered nurse. Fall prevention education was provided and the patient/caregiver indicated understanding., Patient/family have participated as able in goal setting and plan of care. , and Patient/family agree to work toward stated goals and plan of care.     Thank you for this referral.  Radha Morales   Time Calculation: 21 mins

## 2021-10-07 NOTE — PROGRESS NOTES
FUNCTIONAL STATUS PRIOR TO ADMISSION: Patient resides at [facility]. Reports being independent in BADLs, to include showering. Patient is mod I for mobility. HOME SUPPORT: Patient from [facility]. Occupational Therapy Goals  Initiated 9/30/2021; Weekly Re-Assessment 10/7/21  1. Patient will perform tolerate standing with RW support and CGA in prep for standing ADLs within 7 day(s). -goal met and upgraded to Supervision and less than 3 verbal cues for sequencing/safety  2. Patient will perform standing bathing with minimal assistance within 7 day(s). -revised from seated bathing with CGA  3. Patient will perform upper body dressing and lower body dressing with minimal assistance/contact guard assist using RW prn within 7 day(s). -continue for consistency  4. Patient will perform toilet transfers with CGA using RW prn within 7 day(s). -goal met and upgraded to Supervision and less than 3 verbal cues for sequencing/safety  5. Patient will perform all aspects of toileting with CGA using RW prn within 7 day(s). -continue  6. Patient will participate in upper extremity therapeutic exercise/activities with supervision/set-up for 7 minutes within 7 day(s). -continue  7. Patient will utilize energy conservation and fall prevention techniques during functional activities with verbal cues within 7 day(s). -continue      10/7/2021 1001 by Too Scanlon  Outcome: Progressing Towards Goal     OCCUPATIONAL THERAPY TREATMENT/WEEKLY RE-ASSESSMENT  Patient: Elver Arce (78 y.o. male)  Date: 10/7/2021  Diagnosis: Respiratory failure with hypoxia ([facility] [address] [J96.91] <principal problem not specified>       Precautions: Fall, Bed Alarm, Skin  Chart, occupational therapy assessment, plan of care, and goals were reviewed. ASSESSMENT  Patient continues with skilled OT services and is progressing towards goals.  Patient is demonstrating improved activity tolerance with O2 remaining above 92% w/ 2L O2 throughout OT session this date. Pt noted with increased mobility/balance, functionally evidenced by progression to CGA for RW toileting and standing grooming, associated goals met and upgraded to Supervision with less than 3 verbal cues for safe/sequencing secondary to patient continuing to benefit from max verbal cues for task sequencing/initiation and has short term memory deficits, however, pt has good command following. Patient exhibited good distal reaching to BLE, and now requiring min A for LB dressing, with associated goal continued at its current level. Patient continues to benefit from skilled acute OT services, with recommendation of SNF upon discharge to address the above functional limitations. Current Level of Function Impacting Discharge (ADLs): minimum assistance LB ADLs, setup-CGA UB /standing ADLs    Other factors to consider for discharge: none         PLAN :  Goals have been updated based on progression since last assessment. Patient continues to benefit from skilled intervention to address the above impairments. Continue to follow patient 5 times a week to address goals.     Recommend with staff: OOB meals, toileting to/from bathroom x1 assist, active ADL engagement    Recommend next OT session: POC progression    Recommendation for discharge: (in order for the patient to meet his/her long term goals)  Therapy up to 5 days/week in SNF setting    This discharge recommendation:  Has been made in collaboration with the attending provider and/or case management    IF patient discharges home will need the following DME: TBD       SUBJECTIVE:   Patient stated Edward Stains will start helping with wiping patient reported regarding bowel hygiene    OBJECTIVE DATA SUMMARY:   Cognitive/Behavioral Status:  Neurologic State: Alert  Orientation Level: Oriented X4  Cognition: Follows commands;Memory loss  Perception: Appears intact  Perseveration: No perseveration noted  Safety/Judgement: Decreased awareness of environment    Functional Mobility and Transfers for ADLs:    Transfers:  Sit to Stand: Stand-by assistance  Functional Transfers  Bathroom Mobility: Contact guard assistance  Toilet Transfer : Contact guard assistance       Balance:  Sitting: Intact; Without support  Sitting - Static: Good (unsupported)  Sitting - Dynamic: Good (unsupported)  Standing: Impaired  Standing - Static: Constant support;Good  Standing - Dynamic : Constant support; Fair    ADL Intervention:   Patient instructed and indicated understanding the benefits of maintaining activity tolerance, functional mobility, and independence with self care tasks during acute stay to ensure safe return home and to baseline. Encouraged patient to increase frequency and duration OOB, be out of bed for all meals, perform daily ADLs (as approved by RN/MD regarding bathing, toileting, etc), and performing functional mobility to/from bathroom. Pt also educated on safe transfer techniques, with specific emphasis on proper hand placement to push up from seated surface rather than attempt to pull self up, fully positioning self in-front of desired seated location, feeling chair on back of legs and reaching back with 1-2 UE to slowly lower self to seated position. Grooming  Grooming Assistance: Stand-by assistance  Position Performed: Standing  Washing Hands: Stand-by assistance      Toileting  Bladder Hygiene: Set-up  Bowel Hygiene: Maximum assistance (for hygiene)  Clothing Management: Minimum assistance    Cognitive Retraining  Safety/Judgement: Decreased awareness of environment      Pain:  No c/o pain    Activity Tolerance:   Good    After treatment patient left in no apparent distress:   Sitting in chair, Call bell within reach, and RN following    COMMUNICATION/COLLABORATION:   The patients plan of care was discussed with: Physical therapist, Registered nurse, and Case management.      Kristine Ramon  Time Calculation: 28 mins    Regarding student involvement in patient care:  A student participated in this treatment session. Per CMS Medicare statements and AOTA guidelines I certify that the following was true:  1. I was present and directly observed the entire session. 2. I made all skilled judgments and clinical decisions regarding care. 3. I am the practitioner responsible for assessment, treatment, and documentation.

## 2021-10-07 NOTE — PROGRESS NOTES
Problem: Falls - Risk of  Goal: *Absence of Falls  Description: Document Neha Tamayo Fall Risk and appropriate interventions in the flowsheet.   Outcome: Progressing Towards Goal  Note: Fall Risk Interventions:  Mobility Interventions: Patient to call before getting OOB         Medication Interventions: Patient to call before getting OOB    Elimination Interventions: Patient to call for help with toileting needs    History of Falls Interventions: Consult care management for discharge planning         Problem: Patient Education: Go to Patient Education Activity  Goal: Patient/Family Education  Outcome: Progressing Towards Goal     Problem: Patient Education: Go to Patient Education Activity  Goal: Patient/Family Education  Outcome: Progressing Towards Goal

## 2021-10-07 NOTE — PROGRESS NOTES
FISH pending  CT abdomen reviewed- mild splenomegaly    No additional inpatient work up from us and will follow OP

## 2021-10-07 NOTE — PROGRESS NOTES
Rounded on [employer] patients and provided Anointing of the Sick and Communion  at request of patient. Fr. Paul Yates.

## 2021-10-07 NOTE — PROGRESS NOTES
Bedside and Verbal shift change report given to Elaine  (oncoming nurse) by Dedrick Alonzo (offgoing nurse). Report included the following information SBAR, Kardex, Procedure Summary, Intake/Output, MAR, Recent Results, Med Rec Status and Cardiac Rhythm sinus joanna.

## 2021-10-07 NOTE — PROGRESS NOTES
Transition of Care  1. RUR 18% Low  2. Disposition [place], pending medical stability   3. DME Walker  4. Transportation BLS  5. Follow Up PCP/Specialist      Patient planned for possible DC today, CM placed call to [address] [facility] at [facility] who confirmed bed availability. CM also returned call to [id number] Dominick Jurado, wellness nurse, at [facility] to provide update. CM will fax DC summary to [place] at [phone] when available.      Rogena Meigs, MS

## 2021-10-07 NOTE — PROGRESS NOTES
Hospitalist Progress Note  Heath Schultz MD  Answering service: [phone] from in house phone      Date of Service:  10/7/2021  NAME:  Lexus Stein  :    MRN:  [id number]      Admission Summary:   Lizet Sands a 77 y. o. male w/ hx of mdd w/ SERGEI, htn, glaucoma, NIDDM II, Dyslipidemia who is referred to hospital from [facility] facility for hypoxia. Incidental hypoxia noted during routine vitals. Pt states he feels well otherwise and had no acute complaints or concerns. Reported hypoxia to 60s prior to EMS arrival.  Concerned for \"lesions\" on his buttocks noted about a week ago, recurrent and intermittently painful. Saw his PCP abiut these lesion and was prescribed a cream. Denies melena or dyschezia.  The patient denies any fever, cough, chills, chest or abdominal pain, nausea, vomiting, cough, congestion, recent illness, palpitations, or dysuria.     Remarkable vitals on ER Presentations: bp 158/98, o2 sats 98% on 8L Non-rebreather  Labs Remarkable for: wbc 15, k 3.3, glu 165, rapid covid neg  ER Images: Patchy densities in the left mid and lower thorax and right base could represent  atelectasis or airspace disease. ER Rx: rocephin 1g    Interval history / Subjective:      Patient seen and examined today. \"Am I being kept here or discharged, I'm confused\". He is now down to 2L oxygen. Feel same. His wbc trending up     Assessment & Plan:     # Acute hypoxic/hypercarbic respiratory failure due to infection VS atelectasis VS pulmonary edema. #Left upper lobe infiltrate with bilateral hilar and mediastinal LNPlikely reactive VS other lymphomatous condition,pneumonia/pneumonitis VS pulmonary edema  - procalcitonin <0.05, Pro-bnp mild elevation 639  - completed course of Rocephin and doxycycline 7 days   - improving better but still intermittent SOB. May be some anxiety component.   - wean oxygen as tolerated   - NIPPV as needed and at night   - s/p diuretic challenge      # Low-grade B-cell lymphoma   - on flow cytometry done on 10/4/2021 for concern for atypical lymphocytes with lymphocytosis and LAD. Pending   - FISH test pending   - Oncology outpatient follow up     # Worsening leukocytosis: lymphocyte predominance, infectious vs lymphoma   # Hypokalemia: replace as needed   # HTN: on Lisinopril   # HLD: on Lipitor   # DM2: hold home Metformin, start SSI accucheck   # MDD with SERGEI  # Glaucoma: on brimonidine/latanoprost eye drop   # Obesity, weight 211 LB, pending height assessment and BMI assessment    DVT : Heparin   Code: DNR   Patient accepted at [facility] Problems  Never Reviewed        Codes Class Noted POA    Respiratory failure with hypoxia [facility]) ICD-10-CM: J96.91  ICD-9-CM: 518.81  9/29/2021 Unknown                Review of Systems:   Pertinent positive mentioned in interval hx/HPI. Other systems reviewed and negative     Vital Signs:    Last 24hrs VS reviewed since prior progress note. Most recent are:  Visit Vitals  /61 (BP 1 Location: Left arm, BP Patient Position: At rest)   Pulse (!) 56   Temp 97.7 °F (36.5 °C)   Resp 18   Wt 83.9 kg (184 lb 15.5 oz)   SpO2 95%         Intake/Output Summary (Last 24 hours) at 10/7/2021 1543  Last data filed at 10/7/2021 8564  Gross per 24 hour   Intake 720 ml   Output 450 ml   Net 270 ml        Physical Examination:             Constitutional:  No acute distress, cooperative, pleasant    ENT:  Oral mucous moist, oropharynx benign. Neck supple,    Resp:  CTA bilaterally. No wheezing/rhonchi/rales. No accessory muscle use   CV:  Regular rhythm, normal rate, no murmurs, gallops, rubs    GI:  Soft, non distended, non tender. normoactive bowel sounds, no hepatosplenomegaly     Musculoskeletal:  No edema, warm, 2+ pulses throughout    Neurologic:  Moves all extremities.   AAOx3, CN II-XII reviewed            Data Review:      I personally reviewed labs and imaging     Labs:     Recent Labs     10/07/21  1130 10/06/21  0118   WBC 22.5* 18.8*   HGB 17.0 16.2   HCT 55.0* 50.9*   * 104*     Recent Labs     10/07/21  1130 10/06/21  0118 10/05/21  0113    138 138   K 4.4 3.6 3.2*   CL 97 100 98   CO2 37* 34* 37*   BUN 21* 18 21*   CREA 0.94 0.70 0.78   * 105* 104*   CA 9.4 8.7 9.0     Recent Labs     10/07/21  1130 10/06/21  0118 10/05/21  0113   ALT 61 64 34   * 182* 149*   TBILI 0.4 0.6 0.6   TP 6.8 6.1* 5.9*   ALB 3.6 3.1* 3.1*   GLOB 3.2 3.0 2.8     No results for input(s): INR, PTP, APTT, INREXT, INREXT in the last 72 hours. No results for input(s): FE, TIBC, PSAT, FERR in the last 72 hours. No results found for: FOL, RBCF   No results for input(s): PH, PCO2, PO2 in the last 72 hours. No results for input(s): CPK, CKNDX, TROIQ in the last 72 hours.     No lab exists for component: CPKMB  No results found for: CHOL, CHOLX, CHLST, CHOLV, HDL, HDLP, LDL, LDLC, DLDLP, TGLX, TRIGL, TRIGP, CHHD, CHHDX  No results found for: [facility]  Lab Results   Component Value Date/Time    Color YELLOW/STRAW 09/29/2021 06:20 PM    Appearance CLEAR 09/29/2021 06:20 PM    Specific gravity 1.021 09/29/2021 06:20 PM    pH (UA) 6.5 09/29/2021 06:20 PM    Protein TRACE (A) 09/29/2021 06:20 PM    Glucose Negative 09/29/2021 06:20 PM    Ketone Negative 09/29/2021 06:20 PM    Bilirubin Negative 09/29/2021 06:20 PM    Urobilinogen 1.0 09/29/2021 06:20 PM    Nitrites Negative 09/29/2021 06:20 PM    Leukocyte Esterase MODERATE (A) 09/29/2021 06:20 PM    Epithelial cells FEW 09/29/2021 06:20 PM    Bacteria Negative 09/29/2021 06:20 PM    WBC 0-4 09/29/2021 06:20 PM    RBC 0-5 09/29/2021 06:20 PM         Medications Reviewed:     Current Facility-Administered Medications   Medication Dose Route Frequency    glucose chewable tablet 16 g  4 Tablet Oral PRN    dextrose (D50W) injection syrg 12.5-25 g  25-50 mL IntraVENous PRN    glucagon (GLUCAGEN) injection 1 mg  1 mg IntraMUSCular PRN    insulin lispro (HUMALOG) injection   SubCUTAneous AC&HS    ALPRAZolam (XANAX) tablet 0.5 mg  0.5 mg Oral QID PRN    hydrOXYzine HCL (ATARAX) tablet 25 mg  25 mg Oral QID PRN    lidocaine 4 % patch 1 Patch  1 Patch TransDERmal Q24H    diclofenac (VOLTAREN) 1 % topical gel 2 g  2 g Topical QID PRN    influenza vaccine 2021-22 (6 mos+)(PF) (FLUARIX/FLULAVAL/FLUZONE QUAD) injection 0.5 mL  1 Each IntraMUSCular PRIOR TO DISCHARGE    atorvastatin (LIPITOR) tablet 30 mg  30 mg Oral QHS    balsam peru-castor oiL (VENELEX) ointment   Topical TID    busPIRone (BUSPAR) tablet 10 mg  10 mg Oral BID    FLUoxetine (PROzac) capsule 60 mg  60 mg Oral DAILY    lisinopriL (PRINIVIL, ZESTRIL) tablet 10 mg  10 mg Oral DAILY    senna-docusate (PERICOLACE) 8.6-50 mg per tablet 1 Tablet  1 Tablet Oral BID    traZODone (DESYREL) tablet 100 mg  100 mg Oral QHS    brimonidine (ALPHAGAN) 0.2 % ophthalmic solution 1 Drop  1 Drop Both Eyes DAILY    latanoprost (XALATAN) 0.005 % ophthalmic solution 1 Drop  1 Drop Both Eyes QPM    ARIPiprazole (ABILIFY) tablet 2 mg  2 mg Oral DAILY    sodium chloride (NS) flush 5-40 mL  5-40 mL IntraVENous Q8H    sodium chloride (NS) flush 5-40 mL  5-40 mL IntraVENous PRN    acetaminophen (TYLENOL) tablet 650 mg  650 mg Oral Q6H PRN    Or    acetaminophen (TYLENOL) suppository 650 mg  650 mg Rectal Q6H PRN    polyethylene glycol (MIRALAX) packet 17 g  17 g Oral DAILY PRN    ondansetron (ZOFRAN ODT) tablet 4 mg  4 mg Oral Q8H PRN    Or    ondansetron (ZOFRAN) injection 4 mg  4 mg IntraVENous Q6H PRN    heparin (porcine) injection 5,000 Units  5,000 Units SubCUTAneous Q8H     ______________________________________________________________________  EXPECTED LENGTH OF STAY: 3d 14h  ACTUAL LENGTH OF STAY:          8                 Belinda Lezama MD   Patient has given Verbal permission to discuss medical care with   persons present in the room and and also with contact as listed on face sheet. Please note that this dictation was completed with [employer], the computer voice recognition software. Quite often unanticipated grammatical, syntax, homophones, and other interpretive errors are inadvertently transcribed by the computer software. Please disregard these errors. Please excuse any errors that have escaped final proofreading. Thank you.

## 2021-10-08 ENCOUNTER — APPOINTMENT (OUTPATIENT)
Dept: GENERAL RADIOLOGY | Age: 76
DRG: 840 | End: 2021-10-08
Attending: PHYSICIAN ASSISTANT
Payer: MEDICARE

## 2021-10-08 LAB
ALBUMIN SERPL-MCNC: 3.7 G/DL (ref 3.5–5)
ALBUMIN/GLOB SERPL: 1.1 {RATIO} (ref 1.1–2.2)
ALP SERPL-CCNC: 191 U/L (ref 45–117)
ALT SERPL-CCNC: 70 U/L (ref 12–78)
ANION GAP SERPL CALC-SCNC: 3 MMOL/L (ref 5–15)
AST SERPL-CCNC: 48 U/L (ref 15–37)
BASOPHILS # BLD: 0 K/UL (ref 0–0.1)
BASOPHILS NFR BLD: 0 % (ref 0–1)
BILIRUB SERPL-MCNC: 0.6 MG/DL (ref 0.2–1)
BUN SERPL-MCNC: 23 MG/DL (ref 6–20)
BUN/CREAT SERPL: 26 (ref 12–20)
CALCIUM SERPL-MCNC: 9.1 MG/DL (ref 8.5–10.1)
CHLORIDE SERPL-SCNC: 100 MMOL/L (ref 97–108)
CO2 SERPL-SCNC: 34 MMOL/L (ref 21–32)
CREAT SERPL-MCNC: 0.89 MG/DL (ref 0.7–1.3)
DIFFERENTIAL METHOD BLD: ABNORMAL
EOSINOPHIL # BLD: 0.5 K/UL (ref 0–0.4)
EOSINOPHIL NFR BLD: 2 % (ref 0–7)
ERYTHROCYTE [DISTWIDTH] IN BLOOD BY AUTOMATED COUNT: 12.9 % (ref 11.5–14.5)
GLOBULIN SER CALC-MCNC: 3.4 G/DL (ref 2–4)
GLUCOSE BLD STRIP.AUTO-MCNC: 115 MG/DL (ref 65–117)
GLUCOSE BLD STRIP.AUTO-MCNC: 124 MG/DL (ref 65–117)
GLUCOSE BLD STRIP.AUTO-MCNC: 143 MG/DL (ref 65–117)
GLUCOSE BLD STRIP.AUTO-MCNC: 165 MG/DL (ref 65–117)
GLUCOSE SERPL-MCNC: 124 MG/DL (ref 65–100)
HCT VFR BLD AUTO: 56.4 % (ref 36.6–50.3)
HGB BLD-MCNC: 17.4 G/DL (ref 12.1–17)
IMM GRANULOCYTES # BLD AUTO: 0 K/UL
IMM GRANULOCYTES NFR BLD AUTO: 0 %
LYMPHOCYTES # BLD: 17.2 K/UL (ref 0.8–3.5)
LYMPHOCYTES NFR BLD: 68 % (ref 12–49)
MCH RBC QN AUTO: 29.1 PG (ref 26–34)
MCHC RBC AUTO-ENTMCNC: 30.9 G/DL (ref 30–36.5)
MCV RBC AUTO: 94.3 FL (ref 80–99)
MONOCYTES # BLD: 0.8 K/UL (ref 0–1)
MONOCYTES NFR BLD: 3 % (ref 5–13)
NEUTS SEG # BLD: 6.8 K/UL (ref 1.8–8)
NEUTS SEG NFR BLD: 27 % (ref 32–75)
NRBC # BLD: 0 K/UL (ref 0–0.01)
NRBC BLD-RTO: 0 PER 100 WBC
PLATELET # BLD AUTO: 135 K/UL (ref 150–400)
PMV BLD AUTO: 10.5 FL (ref 8.9–12.9)
POTASSIUM SERPL-SCNC: 3.7 MMOL/L (ref 3.5–5.1)
PROT SERPL-MCNC: 7.1 G/DL (ref 6.4–8.2)
RBC # BLD AUTO: 5.98 M/UL (ref 4.1–5.7)
RBC MORPH BLD: ABNORMAL
SERVICE CMNT-IMP: ABNORMAL
SERVICE CMNT-IMP: NORMAL
SODIUM SERPL-SCNC: 137 MMOL/L (ref 136–145)
WBC # BLD AUTO: 25.3 K/UL (ref 4.1–11.1)
WBC MORPH BLD: ABNORMAL

## 2021-10-08 PROCEDURE — 36415 COLL VENOUS BLD VENIPUNCTURE: CPT

## 2021-10-08 PROCEDURE — 85025 COMPLETE CBC W/AUTO DIFF WBC: CPT

## 2021-10-08 PROCEDURE — 80053 COMPREHEN METABOLIC PANEL: CPT

## 2021-10-08 PROCEDURE — 74011000250 HC RX REV CODE- 250: Performed by: INTERNAL MEDICINE

## 2021-10-08 PROCEDURE — 74011250636 HC RX REV CODE- 250/636: Performed by: STUDENT IN AN ORGANIZED HEALTH CARE EDUCATION/TRAINING PROGRAM

## 2021-10-08 PROCEDURE — 74011250637 HC RX REV CODE- 250/637: Performed by: INTERNAL MEDICINE

## 2021-10-08 PROCEDURE — 71045 X-RAY EXAM CHEST 1 VIEW: CPT

## 2021-10-08 PROCEDURE — 74011250637 HC RX REV CODE- 250/637: Performed by: STUDENT IN AN ORGANIZED HEALTH CARE EDUCATION/TRAINING PROGRAM

## 2021-10-08 PROCEDURE — 82962 GLUCOSE BLOOD TEST: CPT

## 2021-10-08 PROCEDURE — 65660000000 HC RM CCU STEPDOWN

## 2021-10-08 PROCEDURE — 74011636637 HC RX REV CODE- 636/637: Performed by: INTERNAL MEDICINE

## 2021-10-08 RX ADMIN — CASTOR OIL AND BALSAM, PERU: 788; 87 OINTMENT TOPICAL at 22:11

## 2021-10-08 RX ADMIN — INSULIN LISPRO 2 UNITS: 100 INJECTION, SOLUTION INTRAVENOUS; SUBCUTANEOUS at 12:33

## 2021-10-08 RX ADMIN — HEPARIN SODIUM 5000 UNITS: 5000 INJECTION INTRAVENOUS; SUBCUTANEOUS at 09:02

## 2021-10-08 RX ADMIN — CASTOR OIL AND BALSAM, PERU: 788; 87 OINTMENT TOPICAL at 17:16

## 2021-10-08 RX ADMIN — BUSPIRONE HYDROCHLORIDE 10 MG: 5 TABLET ORAL at 09:01

## 2021-10-08 RX ADMIN — ACETAMINOPHEN 650 MG: 325 TABLET ORAL at 12:50

## 2021-10-08 RX ADMIN — FLUOXETINE 60 MG: 20 CAPSULE ORAL at 09:02

## 2021-10-08 RX ADMIN — TRAZODONE HYDROCHLORIDE 100 MG: 100 TABLET ORAL at 22:10

## 2021-10-08 RX ADMIN — LISINOPRIL 10 MG: 10 TABLET ORAL at 09:01

## 2021-10-08 RX ADMIN — ARIPIPRAZOLE 2 MG: 2 TABLET ORAL at 09:03

## 2021-10-08 RX ADMIN — ATORVASTATIN CALCIUM 30 MG: 20 TABLET, FILM COATED ORAL at 22:10

## 2021-10-08 RX ADMIN — BRIMONIDINE TARTRATE 1 DROP: 2 SOLUTION OPHTHALMIC at 09:04

## 2021-10-08 RX ADMIN — HEPARIN SODIUM 5000 UNITS: 5000 INJECTION INTRAVENOUS; SUBCUTANEOUS at 17:14

## 2021-10-08 RX ADMIN — DICLOFENAC SODIUM 2 G: 10 GEL TOPICAL at 12:50

## 2021-10-08 RX ADMIN — CASTOR OIL AND BALSAM, PERU: 788; 87 OINTMENT TOPICAL at 09:06

## 2021-10-08 RX ADMIN — INSULIN LISPRO 2 UNITS: 100 INJECTION, SOLUTION INTRAVENOUS; SUBCUTANEOUS at 17:14

## 2021-10-08 RX ADMIN — BUSPIRONE HYDROCHLORIDE 10 MG: 5 TABLET ORAL at 17:15

## 2021-10-08 RX ADMIN — Medication 10 ML: at 22:12

## 2021-10-08 RX ADMIN — ALPRAZOLAM 0.5 MG: 0.5 TABLET ORAL at 17:14

## 2021-10-08 RX ADMIN — Medication 10 ML: at 13:09

## 2021-10-08 RX ADMIN — LATANOPROST 1 DROP: 50 SOLUTION OPHTHALMIC at 17:15

## 2021-10-08 RX ADMIN — Medication 10 ML: at 06:00

## 2021-10-08 RX ADMIN — HEPARIN SODIUM 5000 UNITS: 5000 INJECTION INTRAVENOUS; SUBCUTANEOUS at 00:06

## 2021-10-08 NOTE — PROGRESS NOTES
2020- Bedside shift change report given to Javier Gallego (oncoming nurse) by WONG Leary (offgoing nurse). Report included the following information SBAR, Kardex, Procedure Summary, MAR and Recent Results.

## 2021-10-08 NOTE — PROGRESS NOTES
Hospitalist Progress Note  Audra Stevens MD  Answering service: [phone] from in house phone      Date of Service:  10/8/2021  NAME:  Yunier Medina  :    MRN:  [id number]      Admission Summary:   Peggy Hinojosa a 77 y. o. male w/ hx of mdd w/ SERGEI, htn, glaucoma, NIDDM II, Dyslipidemia who is referred to hospital from [facility] facility for hypoxia. Incidental hypoxia noted during routine vitals. Pt states he feels well otherwise and had no acute complaints or concerns. Reported hypoxia to 60s prior to EMS arrival.  Concerned for \"lesions\" on his buttocks noted about a week ago, recurrent and intermittently painful. Saw his PCP abiut these lesion and was prescribed a cream. Denies melena or dyschezia.  The patient denies any fever, cough, chills, chest or abdominal pain, nausea, vomiting, cough, congestion, recent illness, palpitations, or dysuria.     Remarkable vitals on ER Presentations: bp 158/98, o2 sats 98% on 8L Non-rebreather  Labs Remarkable for: wbc 15, k 3.3, glu 165, rapid covid neg  ER Images: Patchy densities in the left mid and lower thorax and right base could represent  atelectasis or airspace disease. ER Rx: rocephin 1g    Interval history / Subjective:      Patient seen and examined today. He is off oxygen since morning and stable so far. No cp, or sob    Assessment & Plan:     # Acute hypoxic/hypercarbic respiratory failure due to infection VS atelectasis VS pulmonary edema. #Left upper lobe infiltrate with bilateral hilar and mediastinal LNPlikely reactive VS other lymphomatous condition,pneumonia/pneumonitis VS pulmonary edema  - procalcitonin <0.05, Pro-bnp mild elevation 639  - completed course of Rocephin and doxycycline 7 days   - improving better but still intermittent SOB.   May be some anxiety component.  - NIPPV as needed and at night   - s/p diuretic challenge   - weaned off oxygen # Low-grade B-cell lymphoma   - on flow cytometry done on 10/4/2021 for concern for atypical lymphocytes with lymphocytosis and LAD. Pending   - FISH test pending   - Oncology outpatient follow up     # Worsening leukocytosis: lymphocyte predominance, infectious vs lymphoma   # Hypokalemia: replace as needed   # HTN: on Lisinopril   # HLD: on Lipitor   # DM2: hold home Metformin, start SSI accucheck   # MDD with SERGEI  # Glaucoma: on brimonidine/latanoprost eye drop   # Obesity, weight 211 LB, pending height assessment and BMI assessment    DVT : Heparin   Code: DNR   Patient accepted at [facility] Problems  Never Reviewed        Codes Class Noted POA    Respiratory failure with hypoxia [facility]) ICD-10-CM: J96.91  ICD-9-CM: 518.81  9/29/2021 Unknown                Review of Systems:   Pertinent positive mentioned in interval hx/HPI. Other systems reviewed and negative     Vital Signs:    Last 24hrs VS reviewed since prior progress note. Most recent are:  Visit Vitals  /64   Pulse 61   Temp 98.4 °F (36.9 °C)   Resp 16   Wt 84.9 kg (187 lb 2.7 oz)   SpO2 93%         Intake/Output Summary (Last 24 hours) at 10/8/2021 1832  Last data filed at 10/8/2021 0853  Gross per 24 hour   Intake    Output 1150 ml   Net -1150 ml        Physical Examination:             Constitutional:  No acute distress, cooperative, pleasant    ENT:  Oral mucous moist, oropharynx benign. Neck supple,    Resp:  CTA bilaterally. No wheezing/rhonchi/rales. No accessory muscle use   CV:  Regular rhythm, normal rate, no murmurs, gallops, rubs    GI:  Soft, non distended, non tender. normoactive bowel sounds, no hepatosplenomegaly     Musculoskeletal:  No edema, warm, 2+ pulses throughout    Neurologic:  Moves all extremities.   AAOx3, CN II-XII reviewed            Data Review:      I personally reviewed labs and imaging     Labs:     Recent Labs     10/08/21  0400 10/07/21  1130   WBC 25.3* 22.5*   HGB 17.4* 17.0   HCT 56.4* 55.0*   * 129*     Recent Labs     10/08/21  0400 10/07/21  1130 10/06/21  0118    136 138   K 3.7 4.4 3.6    97 100   CO2 34* 37* 34*   BUN 23* 21* 18   CREA 0.89 0.94 0.70   * 192* 105*   CA 9.1 9.4 8.7     Recent Labs     10/08/21  0400 10/07/21  1130 10/06/21  0118   ALT 70 61 64   * 180* 182*   TBILI 0.6 0.4 0.6   TP 7.1 6.8 6.1*   ALB 3.7 3.6 3.1*   GLOB 3.4 3.2 3.0     No results for input(s): INR, PTP, APTT, INREXT, INREXT in the last 72 hours. No results for input(s): FE, TIBC, PSAT, FERR in the last 72 hours. No results found for: FOL, RBCF   No results for input(s): PH, PCO2, PO2 in the last 72 hours. No results for input(s): CPK, CKNDX, TROIQ in the last 72 hours.     No lab exists for component: CPKMB  No results found for: CHOL, CHOLX, CHLST, CHOLV, HDL, HDLP, LDL, LDLC, DLDLP, TGLX, TRIGL, TRIGP, CHHD, CHHDX  Lab Results   Component Value Date/Time    Glucose (POC) 143 (H) 10/08/2021 04:30 PM    Glucose (POC) 165 (H) 10/08/2021 11:40 AM    Glucose (POC) 124 (H) 10/08/2021 08:32 AM    Glucose (POC) 131 (H) 10/07/2021 09:32 PM    Glucose (POC) 141 (H) 10/07/2021 04:24 PM     Lab Results   Component Value Date/Time    Color YELLOW/STRAW 09/29/2021 06:20 PM    Appearance CLEAR 09/29/2021 06:20 PM    Specific gravity 1.021 09/29/2021 06:20 PM    pH (UA) 6.5 09/29/2021 06:20 PM    Protein TRACE (A) 09/29/2021 06:20 PM    Glucose Negative 09/29/2021 06:20 PM    Ketone Negative 09/29/2021 06:20 PM    Bilirubin Negative 09/29/2021 06:20 PM    Urobilinogen 1.0 09/29/2021 06:20 PM    Nitrites Negative 09/29/2021 06:20 PM    Leukocyte Esterase MODERATE (A) 09/29/2021 06:20 PM    Epithelial cells FEW 09/29/2021 06:20 PM    Bacteria Negative 09/29/2021 06:20 PM    WBC 0-4 09/29/2021 06:20 PM    RBC 0-5 09/29/2021 06:20 PM         Medications Reviewed:     Current Facility-Administered Medications   Medication Dose Route Frequency    glucose chewable tablet 16 g  4 Tablet Oral PRN    dextrose (D50W) injection syrg 12.5-25 g  25-50 mL IntraVENous PRN    glucagon (GLUCAGEN) injection 1 mg  1 mg IntraMUSCular PRN    insulin lispro (HUMALOG) injection   SubCUTAneous AC&HS    ALPRAZolam (XANAX) tablet 0.5 mg  0.5 mg Oral QID PRN    hydrOXYzine HCL (ATARAX) tablet 25 mg  25 mg Oral QID PRN    lidocaine 4 % patch 1 Patch  1 Patch TransDERmal Q24H    diclofenac (VOLTAREN) 1 % topical gel 2 g  2 g Topical QID PRN    influenza vaccine 2021-22 (6 mos+)(PF) (FLUARIX/FLULAVAL/FLUZONE QUAD) injection 0.5 mL  1 Each IntraMUSCular PRIOR TO DISCHARGE    atorvastatin (LIPITOR) tablet 30 mg  30 mg Oral QHS    balsam peru-castor oiL (VENELEX) ointment   Topical TID    busPIRone (BUSPAR) tablet 10 mg  10 mg Oral BID    FLUoxetine (PROzac) capsule 60 mg  60 mg Oral DAILY    lisinopriL (PRINIVIL, ZESTRIL) tablet 10 mg  10 mg Oral DAILY    senna-docusate (PERICOLACE) 8.6-50 mg per tablet 1 Tablet  1 Tablet Oral BID    traZODone (DESYREL) tablet 100 mg  100 mg Oral QHS    brimonidine (ALPHAGAN) 0.2 % ophthalmic solution 1 Drop  1 Drop Both Eyes DAILY    latanoprost (XALATAN) 0.005 % ophthalmic solution 1 Drop  1 Drop Both Eyes QPM    ARIPiprazole (ABILIFY) tablet 2 mg  2 mg Oral DAILY    sodium chloride (NS) flush 5-40 mL  5-40 mL IntraVENous Q8H    sodium chloride (NS) flush 5-40 mL  5-40 mL IntraVENous PRN    acetaminophen (TYLENOL) tablet 650 mg  650 mg Oral Q6H PRN    Or    acetaminophen (TYLENOL) suppository 650 mg  650 mg Rectal Q6H PRN    polyethylene glycol (MIRALAX) packet 17 g  17 g Oral DAILY PRN    ondansetron (ZOFRAN ODT) tablet 4 mg  4 mg Oral Q8H PRN    Or    ondansetron (ZOFRAN) injection 4 mg  4 mg IntraVENous Q6H PRN    heparin (porcine) injection 5,000 Units  5,000 Units SubCUTAneous Q8H     ______________________________________________________________________  EXPECTED LENGTH OF STAY: 3d 14h  ACTUAL LENGTH OF STAY:          9                 Belinda Lezama MD Patient has given Verbal permission to discuss medical care with   persons present in the room and and also with contact as listed on face sheet. Please note that this dictation was completed with [employer], the computer voice recognition software. Quite often unanticipated grammatical, syntax, homophones, and other interpretive errors are inadvertently transcribed by the computer software. Please disregard these errors. Please excuse any errors that have escaped final proofreading. Thank you.

## 2021-10-08 NOTE — PROGRESS NOTES
Transition of Care  1. RUR 20% Moderate  2. Disposition [place], pending medical stability   3. DME [place]  4. Transportation BLS  5. Follow Up PCP/Specialist      CM continuing to follow for transitional care planning needs. Patient is planned for DC to [facility]. Weekend CM will need to follow up with Cj Finley at [facility] at [phone] to further coordinate DC. Please also fax DC summary to [place] at [facility] where patient is a resident.      Medina Martini MS

## 2021-10-08 NOTE — PROGRESS NOTES
Pulmonary, Critical Care, and Sleep Medicine~Progress Note    Name: Naga Garza MRN: [id number]   : 1945 [facility]   Date: 10/8/2021 2:46 PM Admission: 2021     Impression Plan   1. Acute hypoxic resp failure   2. Abnormal CT scan: findings consistent with lymphoma +/- CAP. SHAYLA anterior small focal area of infiltrate; RLL atx  3. ECHO on 21. EF 55-60%, MV regurgitation. 4. HTN  5. DM II  6. Glaucoma   7. Never smoker  1. Has received diuretics   2. Has completed both rocephin/dox for 7 days  3. Repeat chest film tomorrow  4. Will need repeat chest CT scan in 6 wks; we will conduct PFTs in 1-2 wks following discharge   5. O2 titration above 90%, can wean O2 to room air  6. Heparin proph    7. We will be available again to see if needed      Daily Progression:    10/8  On room air. Sitting up in chair    10/7  Consult Note requested by hospitalist.     Patient presented on  secondary to hypoxemia that was noted at his nursing facility, incidentally. He was apparently not symptomatic from a pulmonary aspect. Never smoked. No hx of asthma or pulmonary disease prior to this admission. CT imaging revealed showed splenomegaly, diffuse mediastinal and hilar adenopathy (largest 2.2cm right paratracheal node) and small focal area of GGO in SHAYLA. 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 I have reviewed the labs and previous days notes. Pertinent items are noted in HPI. No past medical history on file. No past surgical history on file. Prior to Admission medications    Medication Sig Start Date End Date Taking? Authorizing Provider   ARIPiprazole (ABILIFY) 2 mg tablet Take 1 Tablet by mouth daily. 21  Yes Provider, Historical   brimonidine (ALPHAGAN) 0.2 % ophthalmic solution Administer 1 Drop to both eyes daily.  21  Yes Provider, Historical   busPIRone (BUSPAR) 10 mg tablet Take 1 Tablet by mouth two (2) times a day. 21  Yes Provider, Historical   FLUoxetine 60 mg tab Take 1 Tablet by mouth daily. 21  Yes Provider, Historical   lisinopriL (PRINIVIL, ZESTRIL) 10 mg tablet Take 1 Tablet by mouth daily. 21  Yes Provider, Historical   metFORMIN (GLUCOPHAGE) 500 mg tablet Take 0.5 Tablets by mouth two (2) times a day. 21  Yes Provider, Historical   sertraline (ZOLOFT) 100 mg tablet Take 1 Tablet by mouth daily. 21  Yes Provider, Historical   traZODone (DESYREL) 100 mg tablet Take 1 Tablet by mouth nightly. 9/10/21  Yes Provider, Historical   latanoprost (XALATAN) 0.005 % ophthalmic solution Administer 1 Drop to both eyes daily. 21  Yes Provider, Historical   icosapent ethyL (VASCEPA) 1 gram capsule Take 1 Capsule by mouth daily. 21  Yes Provider, Historical   ALPRAZolam (XANAX) 1 mg tablet Take 1 Tablet by mouth two (2) times a day. 21  Yes Provider, Historical   atorvastatin (LIPITOR) 20 mg tablet Take 30 mg by mouth nightly. Yes Provider, Historical   acetaminophen (TYLENOL) 325 mg tablet Take 650 mg by mouth two (2) times a day. Yes Provider, Historical   sodium chloride (OCEAN) 0.65 % nasal squeeze bottle 2 Sprays by Both Nostrils route two (2) times a day. Yes Provider, Historical   senna-docusate (PERICOLACE) 8.6-50 mg per tablet Take 1 Tablet by mouth every twelve (12) hours as needed for Constipation. Yes Provider, Historical     No Known Allergies   Social History     Tobacco Use    Smoking status: Not on file   Substance Use Topics    Alcohol use: Not on file      No family history on file.   OBJECTIVE:     Vital Signs:       Visit Vitals  /69 (BP 1 Location: Right arm, BP Patient Position: Sitting)   Pulse 66   Temp 98.2 °F (36.8 °C)   Resp 16   Wt 84.9 kg (187 lb 2.7 oz)   SpO2 96%      Temp (24hrs), Av.2 °F (36.8 °C), Min:97.7 °F (36.5 °C), Max:98.6 °F (37 °C)     Intake/Output:     Last shift: 10/08 0701 - 10/08 1900  In: -   Out: 175 [Urine:175]    Last 3 shifts: 10/06 1901 - 10/08 0700  In: 480 [P.O.:480]  Out: 1275 [Urine:1275]          Intake/Output Summary (Last 24 hours) at 10/8/2021 1044  Last data filed at 10/8/2021 0853  Gross per 24 hour   Intake    Output 1150 ml   Net -1150 ml       Physical Exam:                                        Exam Findings Other   General: No resp distress noted, appears stated age    [de-identified]:  No ulcers, JVD not elevated, no cervical LAD    Chest: No pectus deformity, normal chest rise b/l    HEART:  No visible thrills    Lungs:  Normal expansion     ABD: Soft/NT, non rigid mildly distended    EXT: No cyanosis/clubbing/edema, normal peripheral pulses    Skin: No rashes or ulcers, no mottling    Neuro: A/O x 3        Medications:  Current Facility-Administered Medications   Medication Dose Route Frequency    glucose chewable tablet 16 g  4 Tablet Oral PRN    dextrose (D50W) injection syrg 12.5-25 g  25-50 mL IntraVENous PRN    glucagon (GLUCAGEN) injection 1 mg  1 mg IntraMUSCular PRN    insulin lispro (HUMALOG) injection   SubCUTAneous AC&HS    ALPRAZolam (XANAX) tablet 0.5 mg  0.5 mg Oral QID PRN    hydrOXYzine HCL (ATARAX) tablet 25 mg  25 mg Oral QID PRN    lidocaine 4 % patch 1 Patch  1 Patch TransDERmal Q24H    diclofenac (VOLTAREN) 1 % topical gel 2 g  2 g Topical QID PRN    influenza vaccine 2021-22 (6 mos+)(PF) (FLUARIX/FLULAVAL/FLUZONE QUAD) injection 0.5 mL  1 Each IntraMUSCular PRIOR TO DISCHARGE    atorvastatin (LIPITOR) tablet 30 mg  30 mg Oral QHS    balsam peru-castor oiL (VENELEX) ointment   Topical TID    busPIRone (BUSPAR) tablet 10 mg  10 mg Oral BID    FLUoxetine (PROzac) capsule 60 mg  60 mg Oral DAILY    lisinopriL (PRINIVIL, ZESTRIL) tablet 10 mg  10 mg Oral DAILY    senna-docusate (PERICOLACE) 8.6-50 mg per tablet 1 Tablet  1 Tablet Oral BID    traZODone (DESYREL) tablet 100 mg  100 mg Oral QHS    brimonidine (ALPHAGAN) 0.2 % ophthalmic solution 1 Drop  1 Drop Both Eyes DAILY    latanoprost (XALATAN) 0.005 % ophthalmic solution 1 Drop  1 Drop Both Eyes QPM    ARIPiprazole (ABILIFY) tablet 2 mg  2 mg Oral DAILY    sodium chloride (NS) flush 5-40 mL  5-40 mL IntraVENous Q8H    sodium chloride (NS) flush 5-40 mL  5-40 mL IntraVENous PRN    acetaminophen (TYLENOL) tablet 650 mg  650 mg Oral Q6H PRN    Or    acetaminophen (TYLENOL) suppository 650 mg  650 mg Rectal Q6H PRN    polyethylene glycol (MIRALAX) packet 17 g  17 g Oral DAILY PRN    ondansetron (ZOFRAN ODT) tablet 4 mg  4 mg Oral Q8H PRN    Or    ondansetron (ZOFRAN) injection 4 mg  4 mg IntraVENous Q6H PRN    heparin (porcine) injection 5,000 Units  5,000 Units SubCUTAneous Q8H       Labs:  ABG No results for input(s): PHI, PCO2I, PO2I, HCO3I, SO2I, FIO2I in the last 72 hours.      CBC Recent Labs     10/08/21  0400 10/07/21  1130 10/06/21  0118   WBC 25.3* 22.5* 18.8*   HGB 17.4* 17.0 16.2   HCT 56.4* 55.0* 50.9*   * 129* 104*   MCV 94.3 95.7 92.9   MCH 29.1 29.6 92.4        Metabolic  Panel Recent Labs     10/08/21  0400 10/07/21  1130 10/06/21  0118    136 138   K 3.7 4.4 3.6    97 100   CO2 34* 37* 34*   * 192* 105*   BUN 23* 21* 18   CREA 0.89 0.94 0.70   CA 9.1 9.4 8.7   ALB 3.7 3.6 3.1*   ALT 70 61 64        Pertinent Labs                Narciso Garcia PA-C  10/8/2021

## 2021-10-09 LAB
ALBUMIN SERPL-MCNC: 3.3 G/DL (ref 3.5–5)
ALBUMIN/GLOB SERPL: 1.1 {RATIO} (ref 1.1–2.2)
ALP SERPL-CCNC: 160 U/L (ref 45–117)
ALT SERPL-CCNC: 71 U/L (ref 12–78)
ANION GAP SERPL CALC-SCNC: 5 MMOL/L (ref 5–15)
AST SERPL-CCNC: 49 U/L (ref 15–37)
BASOPHILS # BLD: 0.2 K/UL (ref 0–0.1)
BASOPHILS NFR BLD: 1 % (ref 0–1)
BILIRUB SERPL-MCNC: 0.6 MG/DL (ref 0.2–1)
BUN SERPL-MCNC: 21 MG/DL (ref 6–20)
BUN/CREAT SERPL: 27 (ref 12–20)
CALCIUM SERPL-MCNC: 8.8 MG/DL (ref 8.5–10.1)
CHLORIDE SERPL-SCNC: 102 MMOL/L (ref 97–108)
CO2 SERPL-SCNC: 31 MMOL/L (ref 21–32)
CREAT SERPL-MCNC: 0.78 MG/DL (ref 0.7–1.3)
DIFFERENTIAL METHOD BLD: ABNORMAL
EOSINOPHIL # BLD: 0.6 K/UL (ref 0–0.4)
EOSINOPHIL NFR BLD: 3 % (ref 0–7)
ERYTHROCYTE [DISTWIDTH] IN BLOOD BY AUTOMATED COUNT: 12.9 % (ref 11.5–14.5)
GLOBULIN SER CALC-MCNC: 2.9 G/DL (ref 2–4)
GLUCOSE BLD STRIP.AUTO-MCNC: 108 MG/DL (ref 65–117)
GLUCOSE BLD STRIP.AUTO-MCNC: 116 MG/DL (ref 65–117)
GLUCOSE BLD STRIP.AUTO-MCNC: 125 MG/DL (ref 65–117)
GLUCOSE BLD STRIP.AUTO-MCNC: 146 MG/DL (ref 65–117)
GLUCOSE SERPL-MCNC: 189 MG/DL (ref 65–100)
HCT VFR BLD AUTO: 49.5 % (ref 36.6–50.3)
HGB BLD-MCNC: 15.8 G/DL (ref 12.1–17)
IMM GRANULOCYTES # BLD AUTO: 0 K/UL
IMM GRANULOCYTES NFR BLD AUTO: 0 %
LYMPHOCYTES # BLD: 13.9 K/UL (ref 0.8–3.5)
LYMPHOCYTES NFR BLD: 65 % (ref 12–49)
MCH RBC QN AUTO: 29.2 PG (ref 26–34)
MCHC RBC AUTO-ENTMCNC: 31.9 G/DL (ref 30–36.5)
MCV RBC AUTO: 91.3 FL (ref 80–99)
MONOCYTES # BLD: 1.1 K/UL (ref 0–1)
MONOCYTES NFR BLD: 5 % (ref 5–13)
NEUTS SEG # BLD: 5.5 K/UL (ref 1.8–8)
NEUTS SEG NFR BLD: 26 % (ref 32–75)
NRBC # BLD: 0.02 K/UL (ref 0–0.01)
NRBC BLD-RTO: 0.1 PER 100 WBC
PLATELET # BLD AUTO: 130 K/UL (ref 150–400)
PMV BLD AUTO: 9.7 FL (ref 8.9–12.9)
POTASSIUM SERPL-SCNC: 3.6 MMOL/L (ref 3.5–5.1)
PROT SERPL-MCNC: 6.2 G/DL (ref 6.4–8.2)
RBC # BLD AUTO: 5.42 M/UL (ref 4.1–5.7)
RBC MORPH BLD: ABNORMAL
SERVICE CMNT-IMP: ABNORMAL
SERVICE CMNT-IMP: ABNORMAL
SERVICE CMNT-IMP: NORMAL
SERVICE CMNT-IMP: NORMAL
SODIUM SERPL-SCNC: 138 MMOL/L (ref 136–145)
WBC # BLD AUTO: 21.3 K/UL (ref 4.1–11.1)
WBC MORPH BLD: ABNORMAL

## 2021-10-09 PROCEDURE — 74011000250 HC RX REV CODE- 250: Performed by: INTERNAL MEDICINE

## 2021-10-09 PROCEDURE — 65660000000 HC RM CCU STEPDOWN

## 2021-10-09 PROCEDURE — 74011250636 HC RX REV CODE- 250/636: Performed by: STUDENT IN AN ORGANIZED HEALTH CARE EDUCATION/TRAINING PROGRAM

## 2021-10-09 PROCEDURE — 80053 COMPREHEN METABOLIC PANEL: CPT

## 2021-10-09 PROCEDURE — 82962 GLUCOSE BLOOD TEST: CPT

## 2021-10-09 PROCEDURE — 74011636637 HC RX REV CODE- 636/637: Performed by: INTERNAL MEDICINE

## 2021-10-09 PROCEDURE — 74011250637 HC RX REV CODE- 250/637: Performed by: INTERNAL MEDICINE

## 2021-10-09 PROCEDURE — 85025 COMPLETE CBC W/AUTO DIFF WBC: CPT

## 2021-10-09 PROCEDURE — 74011250637 HC RX REV CODE- 250/637: Performed by: STUDENT IN AN ORGANIZED HEALTH CARE EDUCATION/TRAINING PROGRAM

## 2021-10-09 PROCEDURE — 36415 COLL VENOUS BLD VENIPUNCTURE: CPT

## 2021-10-09 RX ADMIN — BRIMONIDINE TARTRATE 1 DROP: 2 SOLUTION OPHTHALMIC at 08:41

## 2021-10-09 RX ADMIN — CASTOR OIL AND BALSAM, PERU: 788; 87 OINTMENT TOPICAL at 08:41

## 2021-10-09 RX ADMIN — CASTOR OIL AND BALSAM, PERU: 788; 87 OINTMENT TOPICAL at 17:45

## 2021-10-09 RX ADMIN — HEPARIN SODIUM 5000 UNITS: 5000 INJECTION INTRAVENOUS; SUBCUTANEOUS at 16:32

## 2021-10-09 RX ADMIN — LISINOPRIL 10 MG: 10 TABLET ORAL at 08:40

## 2021-10-09 RX ADMIN — BUSPIRONE HYDROCHLORIDE 10 MG: 5 TABLET ORAL at 08:40

## 2021-10-09 RX ADMIN — FLUOXETINE 60 MG: 20 CAPSULE ORAL at 08:40

## 2021-10-09 RX ADMIN — ATORVASTATIN CALCIUM 30 MG: 20 TABLET, FILM COATED ORAL at 22:19

## 2021-10-09 RX ADMIN — TRAZODONE HYDROCHLORIDE 100 MG: 100 TABLET ORAL at 22:00

## 2021-10-09 RX ADMIN — HEPARIN SODIUM 5000 UNITS: 5000 INJECTION INTRAVENOUS; SUBCUTANEOUS at 00:31

## 2021-10-09 RX ADMIN — LATANOPROST 1 DROP: 50 SOLUTION OPHTHALMIC at 17:45

## 2021-10-09 RX ADMIN — HEPARIN SODIUM 5000 UNITS: 5000 INJECTION INTRAVENOUS; SUBCUTANEOUS at 08:41

## 2021-10-09 RX ADMIN — ARIPIPRAZOLE 2 MG: 2 TABLET ORAL at 08:40

## 2021-10-09 RX ADMIN — Medication 10 ML: at 14:32

## 2021-10-09 RX ADMIN — Medication 10 ML: at 22:20

## 2021-10-09 RX ADMIN — INSULIN LISPRO 2 UNITS: 100 INJECTION, SOLUTION INTRAVENOUS; SUBCUTANEOUS at 12:16

## 2021-10-09 RX ADMIN — CASTOR OIL AND BALSAM, PERU: 788; 87 OINTMENT TOPICAL at 22:20

## 2021-10-09 RX ADMIN — BUSPIRONE HYDROCHLORIDE 10 MG: 5 TABLET ORAL at 17:45

## 2021-10-09 NOTE — PROGRESS NOTES
Problem: Falls - Risk of  Goal: *Absence of Falls  Description: Document Joyce Dela Cruz Fall Risk and appropriate interventions in the flowsheet.   Outcome: Progressing Towards Goal  Note: Fall Risk Interventions:  Mobility Interventions: Bed/chair exit alarm, Patient to call before getting OOB         Medication Interventions: Teach patient to arise slowly, Patient to call before getting OOB, Bed/chair exit alarm    Elimination Interventions: Call light in reach, Bed/chair exit alarm, Toileting schedule/hourly rounds, Urinal in reach    History of Falls Interventions: Evaluate medications/consider consulting pharmacy, Investigate reason for fall         Problem: Patient Education: Go to Patient Education Activity  Goal: Patient/Family Education  Outcome: Progressing Towards Goal     Problem: Patient Education: Go to Patient Education Activity  Goal: Patient/Family Education  Outcome: Progressing Towards Goal     Problem: Patient Education: Go to Patient Education Activity  Goal: Patient/Family Education  Outcome: Progressing Towards Goal     Problem: Patient Education: Go to Patient Education Activity  Goal: Patient/Family Education  Outcome: Progressing Towards Goal     Problem: General Medical Care Plan  Goal: *Vital signs within specified parameters  Outcome: Progressing Towards Goal  Goal: *Labs within defined limits  Outcome: Progressing Towards Goal  Goal: *Absence of infection signs and symptoms  Outcome: Progressing Towards Goal  Goal: *Optimal pain control at patient's stated goal  Outcome: Progressing Towards Goal  Goal: *Skin integrity maintained  Outcome: Progressing Towards Goal  Goal: *Fluid volume balance  Outcome: Progressing Towards Goal  Goal: *Optimize nutritional status  Outcome: Progressing Towards Goal  Goal: *Anxiety reduced or absent  Outcome: Progressing Towards Goal  Goal: *Progressive mobility and function (eg: ADL's)  Outcome: Progressing Towards Goal     Problem: Patient Education: Go to Patient Education Activity  Goal: Patient/Family Education  Outcome: Progressing Towards Goal     Problem: Discharge Planning  Goal: *Discharge to safe environment  Outcome: Progressing Towards Goal

## 2021-10-09 NOTE — PROGRESS NOTES
Bedside and Verbal shift change report given to Danis Olivier (oncoming nurse) by Jane Burgos (offgoing nurse). Report included the following information SBAR, Kardex, ED Summary, Procedure Summary, Intake/Output, MAR, Recent Results and Cardiac Rhythm NSR.

## 2021-10-09 NOTE — PROGRESS NOTES
TRANSITIONS OF CARE:  CRM had followed up with Xochitl and Neeta [address] LPN at [facility] facility. Per our conversation there is no charge nurse on duty to admit patient later tonite. I followed up and [facility] was running late tonite. Per my conversation with person on call for [facility] facility and [place] on call admission contact  Marco Alcala the  patient will be transferred in am of Sunday October 10th with [facility]. Patient has been updated  On the above. I also spoke with Dr Gery Jeans and she is ok with discharge in am.  CRM to fax discharge summary to [facility] upon discharge. Their fax is [phone]. The phone for [facility] [phone] and patient is being transferrred to room 116.  I left a note for crm covering unit in am.

## 2021-10-09 NOTE — PROGRESS NOTES
Hospitalist Progress Note  Sylvia Ireland MD  Answering service: [phone] from in house phone      Date of Service:  10/9/2021  NAME:  Paras Bolton  :  [id number]  MRN:  [id number]      Admission Summary:   Arley Diallo a 77 y. o. male w/ hx of mdd w/ SERGEI, htn, glaucoma, NIDDM II, Dyslipidemia who is referred to hospital from [facility] facility for hypoxia. Incidental hypoxia noted during routine vitals. Pt states he feels well otherwise and had no acute complaints or concerns. Reported hypoxia to 60s prior to EMS arrival.  Concerned for \"lesions\" on his buttocks noted about a week ago, recurrent and intermittently painful. Saw his PCP abiut these lesion and was prescribed a cream. Denies melena or dyschezia.  The patient denies any fever, cough, chills, chest or abdominal pain, nausea, vomiting, cough, congestion, recent illness, palpitations, or dysuria.     Remarkable vitals on ER Presentations: bp 158/98, o2 sats 98% on 8L Non-rebreather  Labs Remarkable for: wbc 15, k 3.3, glu 165, rapid covid neg  ER Images: Patchy densities in the left mid and lower thorax and right base could represent  atelectasis or airspace disease. ER Rx: rocephin 1g    Interval history / Subjective:      Patient seen and examined today. He is on room air since yesterday and saturating in early 90's. Has been up back and forth to the bathroom. \"I don't know what I feel\". Wbc trended down slightly. Mostly lymphocyte predominant. Assessment & Plan:     # Acute hypoxic/hypercarbic respiratory failure due to infection VS atelectasis VS pulmonary edema.    #Left upper lobe infiltrate with bilateral hilar and mediastinal LNPlikely reactive VS other lymphomatous condition,pneumonia/pneumonitis VS pulmonary edema  - procalcitonin <0.05, Pro-bnp mild elevation 639  - completed course of Rocephin and doxycycline 7 days   - improving better but still intermittent SOB. May be some anxiety component.  - NIPPV as needed and at night   - s/p diuretic challenge, now off   - weaned off oxygen      # Low-grade B-cell lymphoma   - on flow cytometry done on 10/4/2021 for concern for atypical lymphocytes with lymphocytosis and LAD. Pending   - FISH test pending   - Oncology outpatient follow up     # Worsening leukocytosis: lymphocyte predominance, likely from underlying lymphoma. No sign of infection    # Hypokalemia: replace as needed   # HTN: on Lisinopril   # HLD: on Lipitor   # DM2: hold home Metformin, start SSI accucheck   # MDD with SERGEI  # Glaucoma: on brimonidine/latanoprost eye drop   # Obesity, weight 211 LB, pending height assessment and BMI assessment    DVT : Heparin   Code: DNR   Patient accepted at [facility] Problems  Never Reviewed        Codes Class Noted POA    Respiratory failure with hypoxia [facility]) ICD-10-CM: J96.91  ICD-9-CM: 518.81  9/29/2021 Unknown                Review of Systems:   Pertinent positive mentioned in interval hx/HPI. Other systems reviewed and negative     Vital Signs:    Last 24hrs VS reviewed since prior progress note. Most recent are:  Visit Vitals  /60   Pulse 61   Temp 97.8 °F (36.6 °C)   Resp 16   Wt 84.7 kg (186 lb 11.7 oz)   SpO2 92%         Intake/Output Summary (Last 24 hours) at 10/9/2021 1654  Last data filed at 10/9/2021 0839  Gross per 24 hour   Intake    Output 700 ml   Net -700 ml        Physical Examination:             Constitutional:  No acute distress, cooperative, pleasant    ENT:  Oral mucous moist, oropharynx benign. Neck supple,    Resp:  CTA bilaterally. No wheezing/rhonchi/rales. No accessory muscle use   CV:  Regular rhythm, normal rate, no murmurs, gallops, rubs    GI:  Soft, non distended, non tender. normoactive bowel sounds, no hepatosplenomegaly     Musculoskeletal:  No edema, warm, 2+ pulses throughout    Neurologic:  Moves all extremities.   AAOx3, CN II-XII reviewed Data Review:      I personally reviewed labs and imaging     Labs:     Recent Labs     10/09/21  1000 10/08/21  0400   WBC 21.3* 25.3*   HGB 15.8 17.4*   HCT 49.5 56.4*   * 135*     Recent Labs     10/09/21  1000 10/08/21  0400 10/07/21  1130    137 136   K 3.6 3.7 4.4    100 97   CO2 31 34* 37*   BUN 21* 23* 21*   CREA 0.78 0.89 0.94   * 124* 192*   CA 8.8 9.1 9.4     Recent Labs     10/09/21  1000 10/08/21  0400 10/07/21  1130   ALT 71 70 61   * 191* 180*   TBILI 0.6 0.6 0.4   TP 6.2* 7.1 6.8   ALB 3.3* 3.7 3.6   GLOB 2.9 3.4 3.2     No results for input(s): INR, PTP, APTT, INREXT, INREXT in the last 72 hours. No results for input(s): FE, TIBC, PSAT, FERR in the last 72 hours. No results found for: FOL, RBCF   No results for input(s): PH, PCO2, PO2 in the last 72 hours. No results for input(s): CPK, CKNDX, TROIQ in the last 72 hours.     No lab exists for component: CPKMB  No results found for: CHOL, CHOLX, CHLST, CHOLV, HDL, HDLP, LDL, LDLC, DLDLP, TGLX, TRIGL, TRIGP, CHHD, CHHDX  Lab Results   Component Value Date/Time    Glucose (POC) 116 10/09/2021 04:28 PM    Glucose (POC) 146 (H) 10/09/2021 11:47 AM    Glucose (POC) 108 10/09/2021 07:56 AM    Glucose (POC) 115 10/08/2021 09:31 PM    Glucose (POC) 143 (H) 10/08/2021 04:30 PM     Lab Results   Component Value Date/Time    Color YELLOW/STRAW 09/29/2021 06:20 PM    Appearance CLEAR 09/29/2021 06:20 PM    Specific gravity 1.021 09/29/2021 06:20 PM    pH (UA) 6.5 09/29/2021 06:20 PM    Protein TRACE (A) 09/29/2021 06:20 PM    Glucose Negative 09/29/2021 06:20 PM    Ketone Negative 09/29/2021 06:20 PM    Bilirubin Negative 09/29/2021 06:20 PM    Urobilinogen 1.0 09/29/2021 06:20 PM    Nitrites Negative 09/29/2021 06:20 PM    Leukocyte Esterase MODERATE (A) 09/29/2021 06:20 PM    Epithelial cells FEW 09/29/2021 06:20 PM    Bacteria Negative 09/29/2021 06:20 PM    WBC 0-4 09/29/2021 06:20 PM    RBC 0-5 09/29/2021 06:20 PM         Medications Reviewed:     Current Facility-Administered Medications   Medication Dose Route Frequency    glucose chewable tablet 16 g  4 Tablet Oral PRN    dextrose (D50W) injection syrg 12.5-25 g  25-50 mL IntraVENous PRN    glucagon (GLUCAGEN) injection 1 mg  1 mg IntraMUSCular PRN    insulin lispro (HUMALOG) injection   SubCUTAneous AC&HS    ALPRAZolam (XANAX) tablet 0.5 mg  0.5 mg Oral QID PRN    hydrOXYzine HCL (ATARAX) tablet 25 mg  25 mg Oral QID PRN    lidocaine 4 % patch 1 Patch  1 Patch TransDERmal Q24H    diclofenac (VOLTAREN) 1 % topical gel 2 g  2 g Topical QID PRN    influenza vaccine 2021-22 (6 mos+)(PF) (FLUARIX/FLULAVAL/FLUZONE QUAD) injection 0.5 mL  1 Each IntraMUSCular PRIOR TO DISCHARGE    atorvastatin (LIPITOR) tablet 30 mg  30 mg Oral QHS    balsam peru-castor oiL (VENELEX) ointment   Topical TID    busPIRone (BUSPAR) tablet 10 mg  10 mg Oral BID    FLUoxetine (PROzac) capsule 60 mg  60 mg Oral DAILY    lisinopriL (PRINIVIL, ZESTRIL) tablet 10 mg  10 mg Oral DAILY    senna-docusate (PERICOLACE) 8.6-50 mg per tablet 1 Tablet  1 Tablet Oral BID    traZODone (DESYREL) tablet 100 mg  100 mg Oral QHS    brimonidine (ALPHAGAN) 0.2 % ophthalmic solution 1 Drop  1 Drop Both Eyes DAILY    latanoprost (XALATAN) 0.005 % ophthalmic solution 1 Drop  1 Drop Both Eyes QPM    ARIPiprazole (ABILIFY) tablet 2 mg  2 mg Oral DAILY    sodium chloride (NS) flush 5-40 mL  5-40 mL IntraVENous Q8H    sodium chloride (NS) flush 5-40 mL  5-40 mL IntraVENous PRN    acetaminophen (TYLENOL) tablet 650 mg  650 mg Oral Q6H PRN    Or    acetaminophen (TYLENOL) suppository 650 mg  650 mg Rectal Q6H PRN    polyethylene glycol (MIRALAX) packet 17 g  17 g Oral DAILY PRN    ondansetron (ZOFRAN ODT) tablet 4 mg  4 mg Oral Q8H PRN    Or    ondansetron (ZOFRAN) injection 4 mg  4 mg IntraVENous Q6H PRN    heparin (porcine) injection 5,000 Units  5,000 Units SubCUTAneous Q8H ______________________________________________________________________  EXPECTED LENGTH OF STAY: 3d 17h  ACTUAL LENGTH OF STAY:          8                 Belinda Lezama MD   Patient has given Verbal permission to discuss medical care with   persons present in the room and and also with contact as listed on face sheet. Please note that this dictation was completed with L2, the computer voice recognition software. Quite often unanticipated grammatical, syntax, homophones, and other interpretive errors are inadvertently transcribed by the computer software. Please disregard these errors. Please excuse any errors that have escaped final proofreading. Thank you.

## 2021-10-10 VITALS
RESPIRATION RATE: 16 BRPM | HEART RATE: 82 BPM | WEIGHT: 186.95 LBS | OXYGEN SATURATION: 93 % | SYSTOLIC BLOOD PRESSURE: 133 MMHG | TEMPERATURE: 98.4 F | DIASTOLIC BLOOD PRESSURE: 64 MMHG

## 2021-10-10 LAB
ALBUMIN SERPL-MCNC: 3.4 G/DL (ref 3.5–5)
ALBUMIN/GLOB SERPL: 1.2 {RATIO} (ref 1.1–2.2)
ALP SERPL-CCNC: 165 U/L (ref 45–117)
ALT SERPL-CCNC: 69 U/L (ref 12–78)
ANION GAP SERPL CALC-SCNC: 2 MMOL/L (ref 5–15)
AST SERPL-CCNC: 44 U/L (ref 15–37)
BASOPHILS # BLD: 0 K/UL (ref 0–0.1)
BASOPHILS NFR BLD: 0 % (ref 0–1)
BILIRUB SERPL-MCNC: 0.6 MG/DL (ref 0.2–1)
BUN SERPL-MCNC: 16 MG/DL (ref 6–20)
BUN/CREAT SERPL: 21 (ref 12–20)
CALCIUM SERPL-MCNC: 9 MG/DL (ref 8.5–10.1)
CHLORIDE SERPL-SCNC: 105 MMOL/L (ref 97–108)
CO2 SERPL-SCNC: 29 MMOL/L (ref 21–32)
CREAT SERPL-MCNC: 0.76 MG/DL (ref 0.7–1.3)
DIFFERENTIAL METHOD BLD: ABNORMAL
EOSINOPHIL # BLD: 0.3 K/UL (ref 0–0.4)
EOSINOPHIL NFR BLD: 1 % (ref 0–7)
ERYTHROCYTE [DISTWIDTH] IN BLOOD BY AUTOMATED COUNT: 12.7 % (ref 11.5–14.5)
GLOBULIN SER CALC-MCNC: 2.9 G/DL (ref 2–4)
GLUCOSE BLD STRIP.AUTO-MCNC: 133 MG/DL (ref 65–117)
GLUCOSE BLD STRIP.AUTO-MCNC: 133 MG/DL (ref 65–117)
GLUCOSE SERPL-MCNC: 132 MG/DL (ref 65–100)
HCT VFR BLD AUTO: 51.8 % (ref 36.6–50.3)
HGB BLD-MCNC: 16.4 G/DL (ref 12.1–17)
IMM GRANULOCYTES # BLD AUTO: 0 K/UL
IMM GRANULOCYTES NFR BLD AUTO: 0 %
LYMPHOCYTES # BLD: 20.6 K/UL (ref 0.8–3.5)
LYMPHOCYTES NFR BLD: 75 % (ref 12–49)
MCH RBC QN AUTO: 29 PG (ref 26–34)
MCHC RBC AUTO-ENTMCNC: 31.7 G/DL (ref 30–36.5)
MCV RBC AUTO: 91.7 FL (ref 80–99)
MONOCYTES # BLD: 1.4 K/UL (ref 0–1)
MONOCYTES NFR BLD: 5 % (ref 5–13)
NEUTS SEG # BLD: 5.2 K/UL (ref 1.8–8)
NEUTS SEG NFR BLD: 19 % (ref 32–75)
NRBC # BLD: 0 K/UL (ref 0–0.01)
NRBC BLD-RTO: 0 PER 100 WBC
PLATELET # BLD AUTO: 134 K/UL (ref 150–400)
PMV BLD AUTO: 10.3 FL (ref 8.9–12.9)
POTASSIUM SERPL-SCNC: 3.6 MMOL/L (ref 3.5–5.1)
PROT SERPL-MCNC: 6.3 G/DL (ref 6.4–8.2)
RBC # BLD AUTO: 5.65 M/UL (ref 4.1–5.7)
RBC MORPH BLD: ABNORMAL
SERVICE CMNT-IMP: ABNORMAL
SERVICE CMNT-IMP: ABNORMAL
SODIUM SERPL-SCNC: 136 MMOL/L (ref 136–145)
WBC # BLD AUTO: 27.5 K/UL (ref 4.1–11.1)
WBC MORPH BLD: ABNORMAL

## 2021-10-10 PROCEDURE — 74011250637 HC RX REV CODE- 250/637: Performed by: INTERNAL MEDICINE

## 2021-10-10 PROCEDURE — 36415 COLL VENOUS BLD VENIPUNCTURE: CPT

## 2021-10-10 PROCEDURE — 82962 GLUCOSE BLOOD TEST: CPT

## 2021-10-10 PROCEDURE — 80053 COMPREHEN METABOLIC PANEL: CPT

## 2021-10-10 PROCEDURE — 74011250636 HC RX REV CODE- 250/636: Performed by: INTERNAL MEDICINE

## 2021-10-10 PROCEDURE — 85025 COMPLETE CBC W/AUTO DIFF WBC: CPT

## 2021-10-10 PROCEDURE — 74011250637 HC RX REV CODE- 250/637: Performed by: STUDENT IN AN ORGANIZED HEALTH CARE EDUCATION/TRAINING PROGRAM

## 2021-10-10 PROCEDURE — 90471 IMMUNIZATION ADMIN: CPT

## 2021-10-10 PROCEDURE — 90686 IIV4 VACC NO PRSV 0.5 ML IM: CPT | Performed by: INTERNAL MEDICINE

## 2021-10-10 PROCEDURE — 74011250636 HC RX REV CODE- 250/636: Performed by: STUDENT IN AN ORGANIZED HEALTH CARE EDUCATION/TRAINING PROGRAM

## 2021-10-10 RX ORDER — ALPRAZOLAM 1 MG/1
0.5 TABLET ORAL
Qty: 30 TABLET | Refills: 0 | Status: SHIPPED | OUTPATIENT
Start: 2021-10-10 | End: 2021-10-10 | Stop reason: SDUPTHER

## 2021-10-10 RX ORDER — ALPRAZOLAM 1 MG/1
0.5 TABLET ORAL
Qty: 30 TABLET | Refills: 0 | Status: SHIPPED | OUTPATIENT
Start: 2021-10-10 | End: 2021-11-09

## 2021-10-10 RX ADMIN — BUSPIRONE HYDROCHLORIDE 10 MG: 5 TABLET ORAL at 09:04

## 2021-10-10 RX ADMIN — FLUOXETINE 60 MG: 20 CAPSULE ORAL at 09:04

## 2021-10-10 RX ADMIN — LISINOPRIL 10 MG: 10 TABLET ORAL at 09:04

## 2021-10-10 RX ADMIN — BRIMONIDINE TARTRATE 1 DROP: 2 SOLUTION OPHTHALMIC at 09:04

## 2021-10-10 RX ADMIN — HEPARIN SODIUM 5000 UNITS: 5000 INJECTION INTRAVENOUS; SUBCUTANEOUS at 00:16

## 2021-10-10 RX ADMIN — HEPARIN SODIUM 5000 UNITS: 5000 INJECTION INTRAVENOUS; SUBCUTANEOUS at 09:04

## 2021-10-10 RX ADMIN — CASTOR OIL AND BALSAM, PERU: 788; 87 OINTMENT TOPICAL at 09:04

## 2021-10-10 RX ADMIN — ACETAMINOPHEN 650 MG: 325 TABLET ORAL at 03:12

## 2021-10-10 RX ADMIN — ALPRAZOLAM 0.5 MG: 0.5 TABLET ORAL at 09:08

## 2021-10-10 RX ADMIN — INFLUENZA VIRUS VACCINE 0.5 ML: 15; 15; 15; 15 SUSPENSION INTRAMUSCULAR at 10:56

## 2021-10-10 RX ADMIN — Medication 10 ML: at 06:22

## 2021-10-10 RX ADMIN — ARIPIPRAZOLE 2 MG: 2 TABLET ORAL at 09:08

## 2021-10-10 NOTE — DISCHARGE INSTRUCTIONS
Discharge SNF/Rehab Instructions/LTAC       PATIENT ID: Elver Arce  MRN: [id number]   YOB: 1945    DATE OF ADMISSION: 9/29/2021  5:13 PM    DATE OF DISCHARGE: 10/10/2021    PRIMARY CARE PROVIDER: Kenzie Garcia MD       ATTENDING PHYSICIAN: Cherise Canas MD  DISCHARGING PROVIDER: Rosa Baird MD     To contact this individual call [phone] and ask the  to page. If unavailable ask to be transferred the Adult Hospitalist Department. CONSULTATIONS: IP CONSULT TO ONCOLOGY  IP CONSULT TO PULMONOLOGY    PROCEDURES/SURGERIES: * No surgery found *    DISCHARGE DIAGNOSES / PLAN:      # Acute hypoxic/hypercarbic respiratory failure: resolved     #Left upper lobe infiltrate with bilateral hilar and mediastinal LNP     # Low-grade B-cell lymphoma   - FISH test pending   - Oncology outpatient follow up with Dr Ailyn Avalos      # Leukocytosis: lymphocyte predominance, likely from underlying lymphoma. No sign of infection      # Hypokalemia  # HTN: on Lisinopril   # HLD: on Lipitor   # DM2: Home Metformin  # MDD with SERGEI  # Glaucoma: on brimonidine/latanoprost eye drop   # Obesity       PENDING TEST RESULTS:   At the time of discharge the following test results are still pending: FISH test     FOLLOW UP APPOINTMENTS:    Follow-up Information     Follow up With Specialties Details Why Zaynab Ohara MD Hematology and Oncology, Internal Medicine, Hematology, Oncology Schedule an appointment as soon as possible for a visit in 2 weeks Oncology: Follow up to complete the work up related to abnormal lymphcytes, lymphocytosis and lymphadenopathy.  Please call office to schedule the appointment in 2-4 weeks or as per Dr. Chauncey Aguirre [address] [place] [address]   [address]  [phone]    Kenzie Garcia MD Family Medicine Schedule an appointment as soon as possible for a visit in 2 weeks Follow up  [address]  [place] [address] [facility] [address]      Beatriz Baron MD Hematology and Oncology, Internal Medicine, Hematology, Oncology Schedule an appointment as soon as possible for a visit in 3 weeks Follow up  [address] [facility]  [address] [facility] [address]:  - Please keep your appointment with your Oncologist     DIET: Diabetic diet     TUBE FEEDING INSTRUCTIONS: None     OXYGEN / BiPAP SETTINGS: None     ACTIVITY: As tolerated     WOUND CARE: None     EQUIPMENT needed: None       DISCHARGE MEDICATIONS:   See Medication Reconciliation Form      NOTIFY YOUR PHYSICIAN FOR ANY OF THE FOLLOWING:   Fever over 101 degrees for 24 hours. Chest pain, shortness of breath, fever, chills, nausea, vomiting, diarrhea, change in mentation, falling, weakness, bleeding. Severe pain or pain not relieved by medications. Or, any other signs or symptoms that you may have questions about.     DISPOSITION:    Home With:   OT  PT  HH  RN      x SNF/Inpatient Rehab/LTAC    Independent/assisted living    Hospice    Other:       PATIENT CONDITION AT DISCHARGE:     Functional status    Poor    x Deconditioned     Independent      Cognition     Lucid     Forgetful     Dementia      Catheters/lines (plus indication)    Lozano     PICC     PEG    x None      Code status     Full code    x DNR      PHYSICAL EXAMINATION AT DISCHARGE:   Refer to Progress Note***      CHRONIC MEDICAL DIAGNOSES:  Problem List as of 10/10/2021 Never Reviewed        Codes Class Noted - Resolved    Respiratory failure with hypoxia ([facility] [address] ICD-10-CM: J96.91  ICD-9-CM: 518.81  9/29/2021 - Present                CDMP Checked:   Yes ***     PROBLEM LIST Updated:  Yes ***         Romelia Ribeiro MD  10/10/2021  9:20 AM

## 2021-10-10 NOTE — PROGRESS NOTES
Bedside and Verbal shift change report given to Korey Prado (oncoming nurse) by Mark Booker (offgoing nurse). Report included the following information SBAR, Kardex, ED Summary, Procedure Summary, Intake/Output, MAR, Recent Results and Cardiac Rhythm NSR.

## 2021-10-10 NOTE — DISCHARGE SUMMARY
Discharge Summary       PATIENT ID: Judy Smith  MRN: [id number]   YOB: 1945    DATE OF ADMISSION: 9/29/2021  5:13 PM    DATE OF DISCHARGE: 10/10/21  PRIMARY CARE PROVIDER: Donato Porras MD       DISCHARGING PROVIDER: Yasmany Tong MD    To contact this individual call [phone] and ask the  to page. If unavailable ask to be transferred the Adult Hospitalist Department. CONSULTATIONS: IP CONSULT TO ONCOLOGY  IP CONSULT TO PULMONOLOGY      PROCEDURES/SURGERIES: * No surgery found *    IMAGING  CTA CHEST W OR W WO CONT    Result Date: 9/29/2021  1. Focal airspace infiltrate anterior left upper lobe suspicious for possible focus of pneumonia with nonspecific broad areas of groundglass opacity which may reflect pulmonary edema, differential aeration, pneumonitis, or infectious process. 2. Hilar and mediastinal adenopathy which may be reactive and for which nonemergent follow-up is recommended. 3. Additional incidentals as above including coronary artery disease. CT ABD PELV W CONT    Result Date: 10/6/2021  There is mild splenomegaly. Large right and small left renal calculi. Large calculus lies in the right renal pelvis. There is no other adenopathy identified in the abdomen or pelvis. Please see above for additional nonemergent incidental findings. XR CHEST PORT    Result Date: 10/8/2021  Stable low lung volumes with minimal bibasilar opacities. XR CHEST PORT    Result Date: 10/6/2021  Stable low lung volumes with minimal basilar opacities. XR CHEST PORT    Result Date: 10/3/2021  Interval improvement in the previously seen areas of airspace disease. XR CHEST PORT    Result Date: 9/30/2021  1. No interval change     XR CHEST PORT    Result Date: 9/29/2021  Depth of inspiration is shallow.  There are linear densities and patchy densities in the left mid and lower thorax and right base could represent atelectasis or airspace disease. ADMITTING DIAGNOSES & HOSPITAL COURSE:   HPI on admission   Nery Sanchez is a 77 y.o. male w/ hx of mdd w/ SERGEI, htn, glaucoma, NIDDM II, Dyslipidemia who is referred to hospital from [address] [place] [address] for hypoxia. Incidental hypoxia noted during routine vitals. Pt states he feels well otherwise and had no acute complaints or concerns. Reported hypoxia to 60s prior to EMS arrival.  Concerned for \"lesions\" on his buttocks noted about a week ago, recurrent and intermittently painful. Saw his PCP abiut these lesion and was prescribed a cream. Denies melena or dyschezia. The patient denies any fever, cough, chills, chest or abdominal pain, nausea, vomiting, cough, congestion, recent illness, palpitations, or dysuria.     Remarkable vitals on ER Presentations: bp 158/98, o2 sats 98% on 8L Non-rebreather  Labs Remarkable for: wbc 15, k 3.3, glu 165, rapid covid neg  ER Images: Patchy densities in the left mid and lower thorax and right base could represent  atelectasis or airspace disease. ER Rx: rocephin 1g    # Acute hypoxic/hypercarbic respiratory failure due to infection VS atelectasis VS pulmonary edema. #Left upper lobe infiltrate with bilateral hilar and mediastinal LNPlikely reactive VS other lymphomatous condition,pneumonia/pneumonitis VS pulmonary edema  - procalcitonin <0.05, Pro-bnp mild elevation 639  - completed course of Rocephin and doxycycline 7 days   - s/p diuretic challenge, now off   - improving better but still intermittent SOB.  May be some anxiety component.  - NIPPV as needed and at night   - weaned off oxygen      # Low-grade B-cell lymphoma   - on flow cytometry done on 10/4/2021 for concern for atypical lymphocytes with lymphocytosis and LAD.  Pending   - FISH test pending   - Oncology followed suggested outpatient follow up      # Worsening leukocytosis: lymphocyte predominance, likely from underlying lymphoma.  No sign of infection    # Hypokalemia: replaced as needed   # HTN: on Lisinopril   # HLD: on Lipitor   # DM2: hold home Metformin, start SSI accucheck   # MDD with SERGEI  # Glaucoma: on brimonidine/latanoprost eye drop   # Obesity, weight 211 LB, pending height assessment and BMI assessment    DISCHARGE DIAGNOSES / PLAN:      # Acute hypoxic/hypercarbic respiratory failure: resolved     #Left upper lobe infiltrate with bilateral hilar and mediastinal LNP     # Low-grade B-cell lymphoma   - FISH test pending   - Oncology outpatient follow up with Dr Winston Cole      # Leukocytosis: lymphocyte predominance, likely from underlying lymphoma. No sign of infection      # Hypokalemia  # HTN: on Lisinopril   # HLD: on Lipitor   # DM2: Home Metformin  # MDD with SERGEI  # Glaucoma: on brimonidine/latanoprost eye drop   # Obesity         Patient Active Problem List   Diagnosis Code    Respiratory failure with hypoxia ([facility] [address] J96.91               PENDING TEST RESULTS:   At the time of discharge the following test results are still pending: None     FOLLOW UP APPOINTMENTS:    Follow-up Information     Follow up With Specialties Details Why Darrel Quiros MD Hematology and Oncology, Internal Medicine, Hematology, Oncology Schedule an appointment as soon as possible for a visit in 2 weeks Oncology: Follow up to complete the work up related to abnormal lymphcytes, lymphocytosis and lymphadenopathy.  Please call office to schedule the appointment in 2-4 weeks or as per Dr. Ashby Boas [address] [facility]  [phone]      [address] [place] [address]   [facility] [phone]    José Miguel Bowman MD Family Medicine Schedule an appointment as soon as possible for a visit in 2 weeks Follow up  [address] [place] [address] [address]      Prabhjot Hammond MD Hematology and Oncology, Internal Medicine, Hematology, Oncology Schedule an appointment as soon as possible for a visit in 3 weeks Follow up  [facility] Baum  [phone]             ADDITIONAL CARE RECOMMENDATIONS:   · It is important that you take the medication exactly as they are prescribed. · Keep your medication in the bottles provided by the pharmacist and keep a list of the medication names, dosages, and times to be taken in your wallet. · Do not take other medications without consulting your doctor. · No drinking alcohol or driving car or operating machinery if you are on narcotic pain medications. Donot take sedating mediations if you are sleepy or confused. · Fall Precautions  · Keep Well Hydrated  · Report to your medical provider if you feel you have  developed allergies to medications  · Follow up with your PCP or Consultant for medication adjustments and refills  · Monitor for signs of fevers,chills,bleeding,chest pain and seek medical attention if you do so. - Please keep your appointment with your Oncologist     DIET: Diabetic diet     TUBE FEEDING INSTRUCTIONS: None     OXYGEN / BiPAP SETTINGS: None     ACTIVITY: As tolerated     WOUND CARE: None     EQUIPMENT needed: None         DISCHARGE MEDICATIONS:  Current Discharge Medication List      CONTINUE these medications which have CHANGED    Details   ALPRAZolam (XANAX) 1 mg tablet Take 0.5 Tablets by mouth two (2) times daily as needed for Anxiety for up to 30 days. Max Daily Amount: 1 mg. Qty: 30 Tablet, Refills: 0  Start date: 10/10/2021, End date: 11/9/2021    Associated Diagnoses: Chronic anxiety         CONTINUE these medications which have NOT CHANGED    Details   ARIPiprazole (ABILIFY) 2 mg tablet Take 1 Tablet by mouth daily. brimonidine (ALPHAGAN) 0.2 % ophthalmic solution Administer 1 Drop to both eyes daily. busPIRone (BUSPAR) 10 mg tablet Take 1 Tablet by mouth two (2) times a day. FLUoxetine 60 mg tab Take 1 Tablet by mouth daily. lisinopriL (PRINIVIL, ZESTRIL) 10 mg tablet Take 1 Tablet by mouth daily. metFORMIN (GLUCOPHAGE) 500 mg tablet Take 0.5 Tablets by mouth two (2) times a day. latanoprost (XALATAN) 0.005 % ophthalmic solution Administer 1 Drop to both eyes daily. icosapent ethyL (VASCEPA) 1 gram capsule Take 1 Capsule by mouth daily. atorvastatin (LIPITOR) 20 mg tablet Take 30 mg by mouth nightly.      sodium chloride (OCEAN) 0.65 % nasal squeeze bottle 2 Sprays by Both Nostrils route two (2) times a day. senna-docusate (PERICOLACE) 8.6-50 mg per tablet Take 1 Tablet by mouth every twelve (12) hours as needed for Constipation.          STOP taking these medications       sertraline (ZOLOFT) 100 mg tablet Comments:   Reason for Stopping:         traZODone (DESYREL) 100 mg tablet Comments:   Reason for Stopping:         acetaminophen (TYLENOL) 325 mg tablet Comments:   Reason for Stopping:               All Micro Results     Procedure Component Value Units Date/Time    CULTURE, BLOOD, PAIRED [[id number]] Collected: 09/29/21 1830    Order Status: Completed Specimen: Blood Updated: 10/04/21 0539     Special Requests: NO SPECIAL REQUESTS        Culture result: NO GROWTH 5 DAYS       RESPIRATORY VIRUS PANEL W/COVID-19, PCR [[id number]] Collected: 09/30/21 0028    Order Status: Completed Specimen: Nasopharyngeal Updated: 09/30/21 0834     Adenovirus Not detected        Coronavirus 229E Not detected        Coronavirus HKU1 Not detected        Coronavirus CVNL63 Not detected        Coronavirus OC43 Not detected        SARS-CoV-2, PCR Not detected        Metapneumovirus Not detected        Rhinovirus and Enterovirus Not detected        Influenza A Not detected        Influenza A, subtype H1 Not detected        Influenza A, subtype H3 Not detected        INFLUENZA A H1N1 PCR Not detected        Influenza B Not detected        Parainfluenza 1 Not detected        Parainfluenza 2 Not detected        Parainfluenza 3 Not detected        Parainfluenza virus 4 Not detected        RSV by PCR Not detected        B. parapertussis, PCR Not detected        Bordetella pertussis - PCR Not detected        Chlamydophila pneumoniae DNA, QL, PCR Not detected        Mycoplasma pneumoniae DNA, QL, PCR Not detected       RESPIRATORY VIRUS PANEL W/COVID-19, PCR [[id number]] Collected: 09/29/21 2249    Order Status: Completed Specimen: Nasopharyngeal Updated: 09/30/21 0149     Adenovirus Not detected        Coronavirus 229E Not detected        Coronavirus HKU1 Not detected        Coronavirus CVNL63 Not detected        Coronavirus OC43 Not detected        SARS-CoV-2, PCR Not detected        Metapneumovirus Not detected        Rhinovirus and Enterovirus Not detected        Influenza A Not detected        Influenza A, subtype H1 Not detected        Influenza A, subtype H3 Not detected        INFLUENZA A H1N1 PCR Not detected        Influenza B Not detected        Parainfluenza 1 Not detected        Parainfluenza 2 Not detected        Parainfluenza 3 Not detected        Parainfluenza virus 4 Not detected        RSV by PCR Not detected        B. parapertussis, PCR Not detected        Bordetella pertussis - PCR Not detected        Chlamydophila pneumoniae DNA, QL, PCR Not detected        Mycoplasma pneumoniae DNA, QL, PCR Not detected       COVID-19 RAPID TEST [[id number]] Collected: 09/29/21 1915    Order Status: Canceled     COVID-19 RAPID TEST [[id number]] Collected: 09/29/21 1820    Order Status: Completed Specimen: Nasopharyngeal Updated: 09/29/21 1842     Specimen source Nasopharyngeal        COVID-19 rapid test Not detected        Comment: Rapid Abbott ID Now       Rapid NAAT:  The specimen is NEGATIVE for SARS-CoV-2, the novel coronavirus associated with COVID-19.        Negative results should be treated as presumptive and, if inconsistent with clinical signs and symptoms or necessary for patient management, should be tested with an alternative molecular assay. Negative results do not preclude SARS-CoV-2 infection and should not be used as the sole basis for patient management decisions. This test has been authorized by the FDA under an Emergency Use Authorization (EUA) for use by authorized laboratories. Fact sheet for Healthcare Providers: ConventionUpdate.co.nz  Fact sheet for Patients: ConventionUpdate.co.nz       Methodology: Isothermal Nucleic Acid Amplification         CULTURE, BLOOD, PAIRED [[id number]]     Order Status: Canceled Specimen: Blood           Recent Results (from the past 24 hour(s))   CBC WITH AUTOMATED DIFF    Collection Time: 10/09/21 10:00 AM   Result Value Ref Range    WBC 21.3 (H) 4.1 - 11.1 K/uL    RBC 5.42 4.10 - 5.70 M/uL    HGB 15.8 12.1 - 17.0 g/dL    HCT 49.5 36.6 - 50.3 %    MCV 91.3 80.0 - 99.0 FL    MCH 29.2 26.0 - 34.0 PG    MCHC 31.9 30.0 - 36.5 g/dL    RDW 12.9 11.5 - 14.5 %    PLATELET 568 (L) 164 - 400 K/uL    MPV 9.7 8.9 - 12.9 FL    NRBC 0.1 (H) 0  WBC    ABSOLUTE NRBC 0.02 (H) 0.00 - 0.01 K/uL    NEUTROPHILS 26 (L) 32 - 75 %    LYMPHOCYTES 65 (H) 12 - 49 %    MONOCYTES 5 5 - 13 %    EOSINOPHILS 3 0 - 7 %    BASOPHILS 1 0 - 1 %    IMMATURE GRANULOCYTES 0 %    ABS. NEUTROPHILS 5.5 1.8 - 8.0 K/UL    ABS. LYMPHOCYTES 13.9 (H) 0.8 - 3.5 K/UL    ABS. MONOCYTES 1.1 (H) 0.0 - 1.0 K/UL    ABS. EOSINOPHILS 0.6 (H) 0.0 - 0.4 K/UL    ABS. BASOPHILS 0.2 (H) 0.0 - 0.1 K/UL    ABS. IMM.  GRANS. 0.0 K/UL    DF MANUAL      RBC COMMENTS NORMOCYTIC, NORMOCHROMIC      WBC COMMENTS ATYPICAL LYMPHOCYTES PRESENT     METABOLIC PANEL, COMPREHENSIVE    Collection Time: 10/09/21 10:00 AM   Result Value Ref Range    Sodium 138 136 - 145 mmol/L    Potassium 3.6 3.5 - 5.1 mmol/L    Chloride 102 97 - 108 mmol/L    CO2 31 21 - 32 mmol/L    Anion gap 5 5 - 15 mmol/L    Glucose 189 (H) 65 - 100 mg/dL    BUN 21 (H) 6 - 20 MG/DL    Creatinine 0.78 0.70 - 1.30 MG/DL    BUN/Creatinine ratio 27 (H) 12 - 20      GFR est AA >60 >60 ml/min/1.73m2    GFR est non-AA >60 >60 ml/min/1.73m2    Calcium 8.8 8.5 - 10.1 MG/DL    Bilirubin, total 0.6 0.2 - 1.0 MG/DL    ALT (SGPT) 71 12 - 78 U/L    AST (SGOT) 49 (H) 15 - 37 U/L    Alk. phosphatase 160 (H) 45 - 117 U/L    Protein, total 6.2 (L) 6.4 - 8.2 g/dL    Albumin 3.3 (L) 3.5 - 5.0 g/dL    Globulin 2.9 2.0 - 4.0 g/dL    A-G Ratio 1.1 1.1 - 2.2     GLUCOSE, POC    Collection Time: 10/09/21 11:47 AM   Result Value Ref Range    Glucose (POC) 146 (H) 65 - 117 mg/dL    Performed by KEYSHAWN DOVE    GLUCOSE, POC    Collection Time: 10/09/21  4:28 PM   Result Value Ref Range    Glucose (POC) 116 65 - 117 mg/dL    Performed by Mario Raya    GLUCOSE, POC    Collection Time: 10/09/21 10:05 PM   Result Value Ref Range    Glucose (POC) 125 (H) 65 - 117 mg/dL    Performed by Maggi López    CBC WITH AUTOMATED DIFF    Collection Time: 10/10/21  3:56 AM   Result Value Ref Range    WBC 27.5 (H) 4.1 - 11.1 K/uL    RBC 5.65 4.10 - 5.70 M/uL    HGB 16.4 12.1 - 17.0 g/dL    HCT 51.8 (H) 36.6 - 50.3 %    MCV 91.7 80.0 - 99.0 FL    MCH 29.0 26.0 - 34.0 PG    MCHC 31.7 30.0 - 36.5 g/dL    RDW 12.7 11.5 - 14.5 %    PLATELET 074 (L) 213 - 400 K/uL    MPV 10.3 8.9 - 12.9 FL    NRBC 0.0 0  WBC    ABSOLUTE NRBC 0.00 0.00 - 0.01 K/uL    NEUTROPHILS 19 (L) 32 - 75 %    LYMPHOCYTES 75 (H) 12 - 49 %    MONOCYTES 5 5 - 13 %    EOSINOPHILS 1 0 - 7 %    BASOPHILS 0 0 - 1 %    IMMATURE GRANULOCYTES 0 %    ABS. NEUTROPHILS 5.2 1.8 - 8.0 K/UL    ABS. LYMPHOCYTES 20.6 (H) 0.8 - 3.5 K/UL    ABS. MONOCYTES 1.4 (H) 0.0 - 1.0 K/UL    ABS. EOSINOPHILS 0.3 0.0 - 0.4 K/UL    ABS. BASOPHILS 0.0 0.0 - 0.1 K/UL    ABS. IMM.  GRANS. 0.0 K/UL    DF MANUAL      RBC COMMENTS NORMOCYTIC, NORMOCHROMIC      WBC COMMENTS REACTIVE LYMPHS     METABOLIC PANEL, COMPREHENSIVE    Collection Time: 10/10/21  3:56 AM   Result Value Ref Range    Sodium 136 136 - 145 mmol/L    Potassium 3.6 3.5 - 5.1 mmol/L Chloride 105 97 - 108 mmol/L    CO2 29 21 - 32 mmol/L    Anion gap 2 (L) 5 - 15 mmol/L    Glucose 132 (H) 65 - 100 mg/dL    BUN 16 6 - 20 MG/DL    Creatinine 0.76 0.70 - 1.30 MG/DL    BUN/Creatinine ratio 21 (H) 12 - 20      GFR est AA >60 >60 ml/min/1.73m2    GFR est non-AA >60 >60 ml/min/1.73m2    Calcium 9.0 8.5 - 10.1 MG/DL    Bilirubin, total 0.6 0.2 - 1.0 MG/DL    ALT (SGPT) 69 12 - 78 U/L    AST (SGOT) 44 (H) 15 - 37 U/L    Alk. phosphatase 165 (H) 45 - 117 U/L    Protein, total 6.3 (L) 6.4 - 8.2 g/dL    Albumin 3.4 (L) 3.5 - 5.0 g/dL    Globulin 2.9 2.0 - 4.0 g/dL    A-G Ratio 1.2 1.1 - 2.2     GLUCOSE, POC    Collection Time: 10/10/21  8:15 AM   Result Value Ref Range    Glucose (POC) 133 (H) 65 - 117 mg/dL    Performed by Zbigniew Ceja            NOTIFY YOUR PHYSICIAN FOR ANY OF THE FOLLOWING:   Fever over 101 degrees for 24 hours. Chest pain, shortness of breath, fever, chills, nausea, vomiting, diarrhea, change in mentation, falling, weakness, bleeding. Severe pain or pain not relieved by medications. Or, any other signs or symptoms that you may have questions about. DISPOSITION:    Home With:   OT  PT  HH  RN      x Long term SNF/Inpatient Rehab    Independent/assisted living    Hospice    Other:       PATIENT CONDITION AT DISCHARGE:     Functional status    Poor    x Deconditioned     Independent      Cognition     Lucid     Forgetful     Dementia      Catheters/lines (plus indication)    Lozano     PICC     PEG    x None      Code status     Full code    x DNR      PHYSICAL EXAMINATION AT DISCHARGE:  Gen:  No distress, alert, elderly, obese, chronically sick looking   HEENT:  Normal cephalic atraumatic, extra-occular movements are intact. Neck:  Supple, No JVD  Lungs:  Clear bilaterally, no wheeze, no rales, normal effort  Heart:  Regular Rate and Rhythm, normal S1 and S2, no edema  Abdomen:  Soft, non tender, normal bowel sounds, no guarding.   Extremities:  Well perfused, no cyanosis or edema  Neurological:  Awake and alert, CN's are intact, normal strength throughout extremities  Skin:  No rashes or moles  Mental Status:  Low mood       CHRONIC MEDICAL DIAGNOSES:  Problem List as of 10/10/2021 Never Reviewed        Codes Class Noted - Resolved    Respiratory failure with hypoxia [facility]) ICD-10-CM: J96.91  ICD-9-CM: 518.81  9/29/2021 - Present                Discussed with patient and family. Explained the importance of following up, Compliance with medications and recommendations on discharge,Side effect profile of medications were explained. Safety precautions at home and while taking pain medications also explained. All questions answered to the satisfaction of the patient/family. Discussed with consultant(s) who are agreeable to the discharge. Verbal and written instructions on discharge given. Explained about Discharge medications and side effect profile. Advised patient/family to followup with their pcp for medication refills and preauthorization of medications, Home health orders. checkups,screenign programs as appropriate for age. Thank you Zoran Man MD for taking care of your patient, Please call with any questions. Greater than 35 minutes were spent with the patient on counseling and coordination of care    Signed:   Juan Johnson MD  10/10/2021  9:23 AM    Please note that this dictation was completed with Pal[employer], the computer voice recognition software. Quite often unanticipated grammatical, syntax, homophones, and other interpretive errors are inadvertently transcribed by the computer software. Please disregard these errors. Please excuse any errors that have escaped final proofreading. Thank you.

## 2021-10-10 NOTE — PROGRESS NOTES
1230: Called on Veinta([id number]) to give report. Report given to GAYATRI TRAN [facility] Staff nurse. Report Included SBAR,Cardex,MAR, ED summery,Progress Note. Question are fully answer. Patient transporter will be here at 1pm.    1340:[facility] Here to  him.

## 2021-10-10 NOTE — PROGRESS NOTES
TRANSITION OF CARE  RUR--18%  Disposition--To [facility]. Transport--BLS transport with [facility]    Patient's primary family contact: friend/POA Nathalia Peñain. CM follow up. CM spoke with GAYATRI TRAN [facility], Staff Nurse at [facility], who confirmed that patient is accepted and that bed is available today (room 116) . Nurse Report to be called to [phone]. CM confirmed  [facility]) (Phone [phone]) for BLS transport scheduled for a  1:00PM . CM confirmed  Completion of PCS and placed it on chart. CM gave verbal update to staff nurse. CM gave patient second Medicare IM Letter which informed patient of the right to appeal the discharge. Patient signed the original which was given to the patient and a copy was placed on the chart. CM faxed AVS and Discharge Summary to Amy Miller at [facility] JASON TAVARES as requested (Fax # [phone]). Transition of Care Plan to SNF/Rehab    SNF/Rehab Transition:  Patient has been accepted to [facility] and meets criteria for admission. Patient will transported by [facility] and expected to leave at 1:00PM.    Communication to Patient/Family:  Met with patient  and he is agreeable to the transition plan. Communication to SNF/Rehab:  Bedside RN, Kaia Fernando RN*, has been notified to update the transition plan to the facility and call report [phone]. Discharge information has been updated on the AVS.       Nursing Please include all hard scripts for controlled substances, med rec and dc summary, and AVS in packet.      Reviewed and confirmed with facility can manage the patient care needs for the following:     SNF/Rehab Transition:  Reviewed and confirmed with facility, and they can manage the patient care needs for the following:     Renard Gibbs with (X) only those applicable:    Medication:  []  Medications will be available at the facility  []  IV Antibiotics   []  Controlled Substance - hard copy to be sent with patient   []  Weekly Labs Documents:  [x] Hard RX  [x] MAR  [x] Kardex  [x] AVS  []Transfer Summary  []Discharge   Equipment:  []  CPAP/BiPAP  []  Wound Vacuum  []  Lozano or Urinary Device  []  PICC/Central Line  []  Nebulizer  []  Ventilator   Treatment:  []Isolation (for MRSA, VRE, etc.)  []Surgical Drain Management  []Tracheostomy Care  []Dressing Changes  []Dialysis with transportation and chair time   []PEG Care  []Oxygen  []Daily Weights for Heart Failure   Dietary:  []Any diet limitations  []Tube Feedings   []Total Parenteral Management (TPN)   Eligible for Medicaid Long Term Services and Supports  Yes:  [] Eligible for medical assistance or will become eligible within 180 days and UAI completed. [] Provider/Patient and/or support system has requested screening. [] UAI copy provided to patient or responsible party,   [] UAI unavailable at discharge will send once processed to SNF provider. [] UAI unavailable at discharged mailed to patient  No:   [] Private pay and is not financially eligible for Medicaid within the next 180 days. [] Reside out-of-state.   [] A residents of a state owned/operated facility that is licensed  by [place] [address] [facility] and [facility] or [facility]  [] Enrollment in [facility] hospice services  [] [address]  [x] Patient /Family declines to have screening completed or provide financial information for screening     Financial Resources:  Medicaid    [] Initiated and application pending   [] Full coverage     Advanced Care Plan:  []Surrogate Decision Maker of Care  []POA  []Communicated Code Status  (DDNR\", \"Full\")    Other     Financial Resources:  Medicaid    [] Initiated and application pending   [] Full coverage     Advanced Care Plan:  []Surrogate Decision Maker of Care  []POA  []Communicated Code Status (DDNR\", \"Full\")    Other

## 2021-11-08 ENCOUNTER — TRANSCRIBE ORDER (OUTPATIENT)
Dept: SCHEDULING | Age: 76
End: 2021-11-08

## 2021-11-08 DIAGNOSIS — R59.1 LAD (LYMPHADENOPATHY): Primary | ICD-10-CM

## 2021-12-01 ENCOUNTER — TRANSCRIBE ORDER (OUTPATIENT)
Dept: SCHEDULING | Age: 76
End: 2021-12-01

## 2021-12-01 DIAGNOSIS — R59.1 LAD (LYMPHADENOPATHY): Primary | ICD-10-CM

## 2022-02-22 ENCOUNTER — HOSPITAL ENCOUNTER (OUTPATIENT)
Dept: CT IMAGING | Age: 77
Discharge: HOME | End: 2022-02-22
Attending: INTERNAL MEDICINE
Payer: MEDICARE

## 2022-02-22 DIAGNOSIS — R59.1 LAD (LYMPHADENOPATHY): ICD-10-CM

## 2022-02-22 PROCEDURE — 71250 CT THORAX DX C-: CPT

## 2022-03-03 ENCOUNTER — TRANSCRIBE ORDER (OUTPATIENT)
Dept: SCHEDULING | Age: 77
End: 2022-03-03

## 2022-03-03 DIAGNOSIS — R59.1 LAD (LYMPHADENOPATHY): Primary | ICD-10-CM

## 2022-03-03 DIAGNOSIS — R59.1 LYMPHADENOPATHY: Primary | ICD-10-CM

## 2022-03-07 ENCOUNTER — TRANSCRIBE ORDER (OUTPATIENT)
Dept: SCHEDULING | Age: 77
End: 2022-03-07

## 2022-03-07 DIAGNOSIS — I27.20 PORTOPULMONARY HYPERTENSION (HCC): Primary | ICD-10-CM

## 2022-03-07 DIAGNOSIS — K76.6 PORTOPULMONARY HYPERTENSION (HCC): Primary | ICD-10-CM

## 2022-03-15 ENCOUNTER — TRANSCRIBE ORDER (OUTPATIENT)
Dept: SCHEDULING | Age: 77
End: 2022-03-15

## 2022-03-15 DIAGNOSIS — J18.9 UNRESOLVED PNEUMONIA: Primary | ICD-10-CM

## 2022-03-15 DIAGNOSIS — R59.1 LYMPHADENOPATHY: Primary | ICD-10-CM

## 2022-03-22 ENCOUNTER — HOSPITAL ENCOUNTER (OUTPATIENT)
Dept: NON INVASIVE DIAGNOSTICS | Age: 77
Discharge: HOME | End: 2022-03-22
Attending: INTERNAL MEDICINE
Payer: MEDICARE

## 2022-03-22 VITALS — SYSTOLIC BLOOD PRESSURE: 133 MMHG | DIASTOLIC BLOOD PRESSURE: 64 MMHG | WEIGHT: 186.95 LBS

## 2022-03-22 DIAGNOSIS — I27.20 PORTOPULMONARY HYPERTENSION (HCC): ICD-10-CM

## 2022-03-22 DIAGNOSIS — K76.6 PORTOPULMONARY HYPERTENSION (HCC): ICD-10-CM

## 2022-03-22 LAB
ECHO AV AREA PEAK VELOCITY: 1.6 CM2
ECHO AV AREA VTI: 1.5 CM2
ECHO AV MEAN GRADIENT: 13 MMHG
ECHO AV MEAN VELOCITY: 1.7 M/S
ECHO AV PEAK GRADIENT: 21 MMHG
ECHO AV PEAK VELOCITY: 2.3 M/S
ECHO AV VELOCITY RATIO: 0.43
ECHO AV VTI: 48.4 CM
ECHO EST RA PRESSURE: 5 MMHG
ECHO LV E' LATERAL VELOCITY: 9 CM/S
ECHO LV E' SEPTAL VELOCITY: 6 CM/S
ECHO LVOT AREA: 3.8 CM2
ECHO LVOT AV VTI INDEX: 0.4
ECHO LVOT DIAM: 2.2 CM
ECHO LVOT MEAN GRADIENT: 2 MMHG
ECHO LVOT PEAK GRADIENT: 4 MMHG
ECHO LVOT PEAK VELOCITY: 1 M/S
ECHO LVOT SV: 72.9 ML
ECHO LVOT VTI: 19.2 CM
ECHO MV A VELOCITY: 1.09 M/S
ECHO MV E VELOCITY: 0.65 M/S
ECHO MV E/A RATIO: 0.6
ECHO MV E/E' LATERAL: 7.22
ECHO MV E/E' RATIO (AVERAGED): 9.03
ECHO MV E/E' SEPTAL: 10.83
ECHO RIGHT VENTRICULAR SYSTOLIC PRESSURE (RVSP): 24 MMHG
ECHO RV INTERNAL DIMENSION: 3.6 CM
ECHO TV REGURGITANT MAX VELOCITY: 2.16 M/S
ECHO TV REGURGITANT PEAK GRADIENT: 19 MMHG

## 2022-03-22 PROCEDURE — 93306 TTE W/DOPPLER COMPLETE: CPT | Performed by: SPECIALIST

## 2022-03-22 PROCEDURE — 93306 TTE W/DOPPLER COMPLETE: CPT
